# Patient Record
Sex: MALE | Race: WHITE | Employment: OTHER | URBAN - METROPOLITAN AREA
[De-identification: names, ages, dates, MRNs, and addresses within clinical notes are randomized per-mention and may not be internally consistent; named-entity substitution may affect disease eponyms.]

---

## 2018-03-22 ENCOUNTER — HOSPITAL ENCOUNTER (OUTPATIENT)
Dept: CARDIOLOGY | Age: 68
Discharge: HOME | End: 2018-03-22
Payer: MEDICARE

## 2018-03-22 DIAGNOSIS — I48.0 PAROXYSMAL ATRIAL FIBRILLATION (HCC): ICD-10-CM

## 2018-03-22 DIAGNOSIS — I42.8 NONISCHEMIC CARDIOMYOPATHY (HCC): ICD-10-CM

## 2018-03-22 LAB
LEFT VENTRICULAR EJECTION FRACTION HIGH VALUE: 55 %
LEFT VENTRICULAR EJECTION FRACTION MODE: NORMAL
LV EF: 50 %
LV EF: 53 %
LVEF MODALITY: NORMAL

## 2018-03-22 PROCEDURE — 93306 TTE W/DOPPLER COMPLETE: CPT

## 2018-03-23 ENCOUNTER — TELEPHONE (OUTPATIENT)
Dept: CARDIOLOGY CLINIC | Age: 68
End: 2018-03-23

## 2018-03-23 NOTE — TELEPHONE ENCOUNTER
----- Message from [place] MD Kyle sent at 3/22/2018  5:23 PM EDT -----  Please notify patient that echocardiogram shows heart muscle function has improved. Continue as directed with needs of weight reduction to assist blood pressure and diabetes.  Follow up as scheduled

## 2018-03-26 ENCOUNTER — TELEPHONE (OUTPATIENT)
Dept: CARDIOLOGY CLINIC | Age: 68
End: 2018-03-26

## 2018-03-26 NOTE — TELEPHONE ENCOUNTER
3/26/18    Per Dr. Bobbette Meigs BP readings. Patient to continue current medications   Spoke with patient, very good and thank you  Feeling pretty good.     LAURITA

## 2018-06-14 ENCOUNTER — OFFICE VISIT (OUTPATIENT)
Dept: NON INVASIVE DIAGNOSTICS | Age: 68
End: 2018-06-14
Payer: MEDICARE

## 2018-06-14 VITALS
HEART RATE: 51 BPM | DIASTOLIC BLOOD PRESSURE: 70 MMHG | BODY MASS INDEX: 38.36 KG/M2 | WEIGHT: 315 LBS | HEIGHT: 76 IN | RESPIRATION RATE: 18 BRPM | SYSTOLIC BLOOD PRESSURE: 140 MMHG

## 2018-06-14 DIAGNOSIS — I48.19 PERSISTENT ATRIAL FIBRILLATION (HCC): Primary | ICD-10-CM

## 2018-06-14 PROCEDURE — 93000 ELECTROCARDIOGRAM COMPLETE: CPT | Performed by: INTERNAL MEDICINE

## 2018-06-14 PROCEDURE — 99214 OFFICE O/P EST MOD 30 MIN: CPT | Performed by: INTERNAL MEDICINE

## 2018-06-14 RX ORDER — LISINOPRIL 10 MG/1
10 TABLET ORAL DAILY
Qty: 90 TABLET | Refills: 3 | Status: SHIPPED | OUTPATIENT
Start: 2018-06-14 | End: 2018-12-10 | Stop reason: DRUGHIGH

## 2018-06-27 ENCOUNTER — HOSPITAL ENCOUNTER (OUTPATIENT)
Age: 68
Discharge: HOME | End: 2018-06-27
Payer: MEDICARE

## 2018-06-27 LAB
ALBUMIN SERPL-MCNC: 4.4 G/DL (ref 3.5–5.2)
ALP BLD-CCNC: 47 U/L (ref 40–129)
ALT SERPL-CCNC: 23 U/L (ref 0–40)
ANION GAP SERPL CALCULATED.3IONS-SCNC: 14 MMOL/L (ref 7–16)
AST SERPL-CCNC: 25 U/L (ref 0–39)
BILIRUB SERPL-MCNC: 0.5 MG/DL (ref 0–1.2)
BUN BLDV-MCNC: 24 MG/DL (ref 8–23)
CALCIUM SERPL-MCNC: 9.5 MG/DL (ref 8.6–10.2)
CHLORIDE BLD-SCNC: 103 MMOL/L (ref 98–107)
CHOLESTEROL, TOTAL: 109 MG/DL (ref 0–199)
CO2: 26 MMOL/L (ref 22–29)
CREAT SERPL-MCNC: 1.5 MG/DL (ref 0.7–1.2)
CREATININE URINE: 100 MG/DL (ref 40–278)
GFR AFRICAN AMERICAN: 56
GFR NON-AFRICAN AMERICAN: 46 ML/MIN/1.73
GLUCOSE BLD-MCNC: 167 MG/DL (ref 74–109)
HDLC SERPL-MCNC: 39 MG/DL
LDL CHOLESTEROL CALCULATED: 49 MG/DL (ref 0–99)
MICROALBUMIN UR-MCNC: 30.2 MG/L
MICROALBUMIN/CREAT UR-RTO: 30.2 (ref 0–30)
POTASSIUM SERPL-SCNC: 4.7 MMOL/L (ref 3.5–5)
SODIUM BLD-SCNC: 143 MMOL/L (ref 132–146)
TOTAL PROTEIN: 7.4 G/DL (ref 6.4–8.3)
TRIGL SERPL-MCNC: 103 MG/DL (ref 0–149)
TSH SERPL DL<=0.05 MIU/L-ACNC: 2.64 UIU/ML (ref 0.27–4.2)
VLDLC SERPL CALC-MCNC: 21 MG/DL

## 2018-06-27 PROCEDURE — 84443 ASSAY THYROID STIM HORMONE: CPT

## 2018-06-27 PROCEDURE — 36415 COLL VENOUS BLD VENIPUNCTURE: CPT

## 2018-06-27 PROCEDURE — 80061 LIPID PANEL: CPT

## 2018-06-27 PROCEDURE — 82044 UR ALBUMIN SEMIQUANTITATIVE: CPT

## 2018-06-27 PROCEDURE — 80053 COMPREHEN METABOLIC PANEL: CPT

## 2018-06-27 PROCEDURE — 82570 ASSAY OF URINE CREATININE: CPT

## 2018-08-16 ENCOUNTER — NURSE ONLY (OUTPATIENT)
Dept: NON INVASIVE DIAGNOSTICS | Age: 68
End: 2018-08-16
Payer: MEDICARE

## 2018-08-16 DIAGNOSIS — I48.19 PERSISTENT ATRIAL FIBRILLATION (HCC): Primary | ICD-10-CM

## 2018-08-16 PROCEDURE — 93000 ELECTROCARDIOGRAM COMPLETE: CPT | Performed by: INTERNAL MEDICINE

## 2018-08-16 RX ORDER — GLUCOSAMINE SULFATE 500 MG
2000 CAPSULE ORAL DAILY
COMMUNITY

## 2018-08-17 NOTE — PROGRESS NOTES
Patient was in today for EKG per Dr Angie Saenz. Patient stopped tikosyn end of June. Patient has no complaints and feels like he is in rhythm since stopping tikosyn.       Niurka West

## 2018-09-26 ENCOUNTER — NURSE ONLY (OUTPATIENT)
Dept: NON INVASIVE DIAGNOSTICS | Age: 68
End: 2018-09-26
Payer: MEDICARE

## 2018-09-26 DIAGNOSIS — I48.19 PERSISTENT ATRIAL FIBRILLATION (HCC): Primary | ICD-10-CM

## 2018-09-26 PROCEDURE — 93000 ELECTROCARDIOGRAM COMPLETE: CPT | Performed by: INTERNAL MEDICINE

## 2018-09-27 ENCOUNTER — TELEPHONE (OUTPATIENT)
Dept: NON INVASIVE DIAGNOSTICS | Age: 68
End: 2018-09-27

## 2018-09-27 DIAGNOSIS — I48.19 PERSISTENT ATRIAL FIBRILLATION (HCC): ICD-10-CM

## 2018-09-27 DIAGNOSIS — I49.1 PAC (PREMATURE ATRIAL CONTRACTION): Primary | ICD-10-CM

## 2018-09-28 ENCOUNTER — NURSE ONLY (OUTPATIENT)
Dept: NON INVASIVE DIAGNOSTICS | Age: 68
End: 2018-09-28

## 2018-10-08 ENCOUNTER — OFFICE VISIT (OUTPATIENT)
Dept: CARDIOLOGY CLINIC | Age: 68
End: 2018-10-08
Payer: MEDICARE

## 2018-10-08 VITALS
HEIGHT: 76 IN | WEIGHT: 314.4 LBS | HEART RATE: 118 BPM | SYSTOLIC BLOOD PRESSURE: 120 MMHG | DIASTOLIC BLOOD PRESSURE: 66 MMHG | BODY MASS INDEX: 38.29 KG/M2 | RESPIRATION RATE: 18 BRPM | OXYGEN SATURATION: 95 %

## 2018-10-08 DIAGNOSIS — E66.8 MODERATE OBESITY: ICD-10-CM

## 2018-10-08 DIAGNOSIS — I50.42 CHRONIC COMBINED SYSTOLIC AND DIASTOLIC CONGESTIVE HEART FAILURE (HCC): ICD-10-CM

## 2018-10-08 DIAGNOSIS — E78.2 MIXED HYPERLIPIDEMIA: ICD-10-CM

## 2018-10-08 DIAGNOSIS — G47.33 OBSTRUCTIVE SLEEP APNEA: ICD-10-CM

## 2018-10-08 DIAGNOSIS — I42.8 NONISCHEMIC CARDIOMYOPATHY (HCC): ICD-10-CM

## 2018-10-08 DIAGNOSIS — I48.0 PAROXYSMAL ATRIAL FIBRILLATION (HCC): Primary | ICD-10-CM

## 2018-10-08 PROCEDURE — 99215 OFFICE O/P EST HI 40 MIN: CPT | Performed by: INTERNAL MEDICINE

## 2018-10-08 PROCEDURE — 93000 ELECTROCARDIOGRAM COMPLETE: CPT | Performed by: INTERNAL MEDICINE

## 2018-10-08 RX ORDER — METOPROLOL SUCCINATE 25 MG/1
12.5 TABLET, EXTENDED RELEASE ORAL 2 TIMES DAILY
Qty: 30 TABLET | Refills: 3 | Status: SHIPPED | OUTPATIENT
Start: 2018-10-08 | End: 2018-10-08 | Stop reason: SDUPTHER

## 2018-10-08 RX ORDER — METOPROLOL SUCCINATE 25 MG/1
37.5 TABLET, EXTENDED RELEASE ORAL 2 TIMES DAILY
Qty: 30 TABLET | Refills: 0 | Status: SHIPPED | OUTPATIENT
Start: 2018-10-08 | End: 2018-11-27 | Stop reason: SDUPTHER

## 2018-10-08 RX ORDER — METOPROLOL SUCCINATE 25 MG/1
37.5 TABLET, EXTENDED RELEASE ORAL 2 TIMES DAILY
Qty: 135 TABLET | Refills: 3 | Status: SHIPPED | OUTPATIENT
Start: 2018-10-08 | End: 2018-10-08 | Stop reason: SDUPTHER

## 2018-10-08 NOTE — PROGRESS NOTES
CREATININE 1.5 (H) 06/27/2018    BUN 24 (H) 06/27/2018     06/27/2018    K 4.7 06/27/2018     06/27/2018    CO2 26 06/27/2018     No results found for: BNP  Lab Results   Component Value Date    WBC 7.2 01/09/2018    RBC 4.72 01/09/2018    HGB 14.1 01/09/2018    HCT 43.1 01/09/2018    MCV 91.3 01/09/2018    MCH 29.9 01/09/2018    MCHC 32.7 01/09/2018    RDW 14.6 01/09/2018     01/09/2018    MPV 10.7 01/09/2018     No results for input(s): ALKPHOS, ALT, AST, PROT, BILITOT, BILIDIR, LABALBU in the last 72 hours. Lab Results   Component Value Date    MG 2.1 01/09/2018     Lab Results   Component Value Date    PROTIME 13.2 11/28/2017    INR 1.2 11/28/2017     Lab Results   Component Value Date    TSH 2.640 06/27/2018     No components found for: CHLPL  Lab Results   Component Value Date    TRIG 103 06/27/2018     Lab Results   Component Value Date    HDL 39 06/27/2018     Lab Results   Component Value Date    LDLCALC 49 06/27/2018       Cardiac Tests:  ECG: A resting electrocardiogram demonstrates evidence of atrial fibrillation with a mean ventricular response of approximately 120 bpm with occasional ventricular ectopy with evidence for left axis deviation, low voltage within the limb leads and a delayed precordial transition sound      ASSESSMENT / PLAN:  On a clinical basis, the patient has developed recurrent symptomatic paroxysmal atrial fibrillation with suboptimal rate control since his most recent electrophysiologic evaluation and discontinuation of antiarrhythmic therapy. Presently he is wearing an extended arrhythmia monitoring device over a two-week period to evaluate further management of his arrhythmia  and I have presently recommended an increase of his beta blocker dosage to 37.5 mg twice daily to achieve improved rate control pending review of his arrhythmia monitoring and electrophysiologic reassessment.  I have discussed his needs of continued appropriate lifestyle modification to achieve weight reduction which will additionally benefit his obstructive sleep apnea and attempt to further reduce risk of arrhythmia recurrence. Continued aggressive risk factor modification of blood pressure and serum lipids will remain essential to reducing risk of future atherosclerotic development. Presently plan his clinical reassessment in approximately 2 months and would happily reassess him in the interim should additional cardiovascular difficulties or concerns arise. The patient's current medication list, allergies, problem list and results of all previously ordered testing were reviewed at today's visit. Follow-up office visit in 2 months      Note: This report was completed using computerized voice recognition software. Every effort has been made to ensure accuracy, however; and invert and computerized transcription errors may be present. Silvina Caruso.  Moise Myles, [address] [facility]    An electronic copy of this follow-up progress note was forwarded to Dr. Suzi Eckert and Yeimy Camara

## 2018-10-16 ENCOUNTER — TELEPHONE (OUTPATIENT)
Dept: NON INVASIVE DIAGNOSTICS | Age: 68
End: 2018-10-16

## 2018-10-16 DIAGNOSIS — I48.19 PERSISTENT ATRIAL FIBRILLATION (HCC): ICD-10-CM

## 2018-10-16 DIAGNOSIS — I49.1 PAC (PREMATURE ATRIAL CONTRACTION): ICD-10-CM

## 2018-11-27 ENCOUNTER — OFFICE VISIT (OUTPATIENT)
Dept: NON INVASIVE DIAGNOSTICS | Age: 68
End: 2018-11-27
Payer: MEDICARE

## 2018-11-27 VITALS
HEIGHT: 76 IN | BODY MASS INDEX: 38.36 KG/M2 | HEART RATE: 145 BPM | SYSTOLIC BLOOD PRESSURE: 132 MMHG | DIASTOLIC BLOOD PRESSURE: 78 MMHG | RESPIRATION RATE: 18 BRPM | WEIGHT: 315 LBS

## 2018-11-27 DIAGNOSIS — I42.8 NONISCHEMIC CARDIOMYOPATHY (HCC): ICD-10-CM

## 2018-11-27 DIAGNOSIS — E66.8 MODERATE OBESITY: ICD-10-CM

## 2018-11-27 DIAGNOSIS — G47.33 OBSTRUCTIVE SLEEP APNEA: ICD-10-CM

## 2018-11-27 DIAGNOSIS — Z98.890 STATUS POST CATHETER ABLATION OF ATRIAL FIBRILLATION: ICD-10-CM

## 2018-11-27 DIAGNOSIS — I48.19 PERSISTENT ATRIAL FIBRILLATION (HCC): Primary | ICD-10-CM

## 2018-11-27 PROCEDURE — 93000 ELECTROCARDIOGRAM COMPLETE: CPT | Performed by: INTERNAL MEDICINE

## 2018-11-27 PROCEDURE — 99213 OFFICE O/P EST LOW 20 MIN: CPT | Performed by: INTERNAL MEDICINE

## 2018-11-27 RX ORDER — METOPROLOL SUCCINATE 50 MG/1
50 TABLET, EXTENDED RELEASE ORAL 2 TIMES DAILY
Qty: 180 TABLET | Refills: 3 | Status: SHIPPED | OUTPATIENT
Start: 2018-11-27 | End: 2020-01-13

## 2018-12-04 ENCOUNTER — ANESTHESIA (OUTPATIENT)
Dept: CARDIAC CATH/INVASIVE PROCEDURES | Age: 68
End: 2018-12-04

## 2018-12-04 ENCOUNTER — ANESTHESIA EVENT (OUTPATIENT)
Dept: CARDIAC CATH/INVASIVE PROCEDURES | Age: 68
End: 2018-12-04

## 2018-12-04 ENCOUNTER — HOSPITAL ENCOUNTER (INPATIENT)
Dept: CARDIAC CATH/INVASIVE PROCEDURES | Age: 68
LOS: 2 days | Discharge: HOME | DRG: 243 | End: 2018-12-07
Attending: INTERNAL MEDICINE | Admitting: INTERNAL MEDICINE
Payer: MEDICARE

## 2018-12-04 VITALS — DIASTOLIC BLOOD PRESSURE: 59 MMHG | SYSTOLIC BLOOD PRESSURE: 96 MMHG | OXYGEN SATURATION: 96 %

## 2018-12-04 DIAGNOSIS — I48.19 PERSISTENT ATRIAL FIBRILLATION (HCC): ICD-10-CM

## 2018-12-04 PROBLEM — I48.91 A-FIB (HCC): Status: ACTIVE | Noted: 2018-12-04

## 2018-12-04 LAB
ANION GAP SERPL CALCULATED.3IONS-SCNC: 11 MMOL/L (ref 7–16)
BASOPHILS ABSOLUTE: 0.03 E9/L (ref 0–0.2)
BASOPHILS RELATIVE PERCENT: 0.5 % (ref 0–2)
BUN BLDV-MCNC: 20 MG/DL (ref 8–23)
CALCIUM SERPL-MCNC: 9.2 MG/DL (ref 8.6–10.2)
CHLORIDE BLD-SCNC: 104 MMOL/L (ref 98–107)
CO2: 24 MMOL/L (ref 22–29)
CREAT SERPL-MCNC: 1.5 MG/DL (ref 0.7–1.2)
EKG ATRIAL RATE: 48 BPM
EKG ATRIAL RATE: 96 BPM
EKG P AXIS: 57 DEGREES
EKG P-R INTERVAL: 194 MS
EKG Q-T INTERVAL: 382 MS
EKG Q-T INTERVAL: 516 MS
EKG QRS DURATION: 102 MS
EKG QRS DURATION: 106 MS
EKG QTC CALCULATION (BAZETT): 460 MS
EKG QTC CALCULATION (BAZETT): 474 MS
EKG R AXIS: -49 DEGREES
EKG R AXIS: -5 DEGREES
EKG T AXIS: -136 DEGREES
EKG T AXIS: 168 DEGREES
EKG VENTRICULAR RATE: 48 BPM
EKG VENTRICULAR RATE: 93 BPM
EOSINOPHILS ABSOLUTE: 0.09 E9/L (ref 0.05–0.5)
EOSINOPHILS RELATIVE PERCENT: 1.4 % (ref 0–6)
GFR AFRICAN AMERICAN: 56
GFR NON-AFRICAN AMERICAN: 46 ML/MIN/1.73
GLUCOSE BLD-MCNC: 102 MG/DL (ref 74–99)
HBA1C MFR BLD: 7.1 % (ref 4–5.6)
HCT VFR BLD CALC: 38.4 % (ref 37–54)
HEMOGLOBIN: 12.1 G/DL (ref 12.5–16.5)
IMMATURE GRANULOCYTES #: 0.01 E9/L
IMMATURE GRANULOCYTES %: 0.2 % (ref 0–5)
LYMPHOCYTES ABSOLUTE: 1.04 E9/L (ref 1.5–4)
LYMPHOCYTES RELATIVE PERCENT: 16.7 % (ref 20–42)
MAGNESIUM: 1.9 MG/DL (ref 1.6–2.6)
MCH RBC QN AUTO: 29.4 PG (ref 26–35)
MCHC RBC AUTO-ENTMCNC: 31.5 % (ref 32–34.5)
MCV RBC AUTO: 93.2 FL (ref 80–99.9)
METER GLUCOSE: 155 MG/DL (ref 74–99)
METER GLUCOSE: 176 MG/DL (ref 74–99)
MONOCYTES ABSOLUTE: 0.52 E9/L (ref 0.1–0.95)
MONOCYTES RELATIVE PERCENT: 8.4 % (ref 2–12)
NEUTROPHILS ABSOLUTE: 4.52 E9/L (ref 1.8–7.3)
NEUTROPHILS RELATIVE PERCENT: 72.8 % (ref 43–80)
PDW BLD-RTO: 14.2 FL (ref 11.5–15)
PLATELET # BLD: 188 E9/L (ref 130–450)
PMV BLD AUTO: 12.2 FL (ref 7–12)
POTASSIUM SERPL-SCNC: 4.2 MMOL/L (ref 3.5–5)
RBC # BLD: 4.12 E12/L (ref 3.8–5.8)
SODIUM BLD-SCNC: 139 MMOL/L (ref 132–146)
WBC # BLD: 6.2 E9/L (ref 4.5–11.5)

## 2018-12-04 PROCEDURE — 2580000003 HC RX 258: Performed by: INTERNAL MEDICINE

## 2018-12-04 PROCEDURE — 6360000002 HC RX W HCPCS: Performed by: NURSE ANESTHETIST, CERTIFIED REGISTERED

## 2018-12-04 PROCEDURE — 92960 CARDIOVERSION ELECTRIC EXT: CPT | Performed by: INTERNAL MEDICINE

## 2018-12-04 PROCEDURE — 85025 COMPLETE CBC W/AUTO DIFF WBC: CPT

## 2018-12-04 PROCEDURE — 83036 HEMOGLOBIN GLYCOSYLATED A1C: CPT

## 2018-12-04 PROCEDURE — 82962 GLUCOSE BLOOD TEST: CPT

## 2018-12-04 PROCEDURE — 2709999900 HC NON-CHARGEABLE SUPPLY

## 2018-12-04 PROCEDURE — 2580000003 HC RX 258: Performed by: NURSE ANESTHETIST, CERTIFIED REGISTERED

## 2018-12-04 PROCEDURE — 3700000001 HC ADD 15 MINUTES (ANESTHESIA)

## 2018-12-04 PROCEDURE — 3700000000 HC ANESTHESIA ATTENDED CARE

## 2018-12-04 PROCEDURE — 93010 ELECTROCARDIOGRAM REPORT: CPT | Performed by: INTERNAL MEDICINE

## 2018-12-04 PROCEDURE — 6370000000 HC RX 637 (ALT 250 FOR IP): Performed by: INTERNAL MEDICINE

## 2018-12-04 PROCEDURE — 93005 ELECTROCARDIOGRAM TRACING: CPT | Performed by: INTERNAL MEDICINE

## 2018-12-04 PROCEDURE — 5A2204Z RESTORATION OF CARDIAC RHYTHM, SINGLE: ICD-10-PCS | Performed by: INTERNAL MEDICINE

## 2018-12-04 PROCEDURE — 80048 BASIC METABOLIC PNL TOTAL CA: CPT

## 2018-12-04 PROCEDURE — 36415 COLL VENOUS BLD VENIPUNCTURE: CPT

## 2018-12-04 PROCEDURE — 94660 CPAP INITIATION&MGMT: CPT

## 2018-12-04 PROCEDURE — 83735 ASSAY OF MAGNESIUM: CPT

## 2018-12-04 RX ORDER — DOFETILIDE 0.25 MG/1
250 CAPSULE ORAL EVERY 12 HOURS SCHEDULED
Status: DISCONTINUED | OUTPATIENT
Start: 2018-12-04 | End: 2018-12-05

## 2018-12-04 RX ORDER — SODIUM CHLORIDE 9 MG/ML
INJECTION, SOLUTION INTRAVENOUS ONCE
Status: COMPLETED | OUTPATIENT
Start: 2018-12-04 | End: 2018-12-04

## 2018-12-04 RX ORDER — DEXTROSE MONOHYDRATE 25 G/50ML
12.5 INJECTION, SOLUTION INTRAVENOUS PRN
Status: DISCONTINUED | OUTPATIENT
Start: 2018-12-04 | End: 2018-12-07 | Stop reason: HOSPADM

## 2018-12-04 RX ORDER — METOPROLOL SUCCINATE 25 MG/1
25 TABLET, EXTENDED RELEASE ORAL 2 TIMES DAILY
Status: DISCONTINUED | OUTPATIENT
Start: 2018-12-04 | End: 2018-12-05

## 2018-12-04 RX ORDER — GLIPIZIDE 5 MG/1
10 TABLET ORAL
Status: DISCONTINUED | OUTPATIENT
Start: 2018-12-04 | End: 2018-12-07 | Stop reason: HOSPADM

## 2018-12-04 RX ORDER — SODIUM CHLORIDE 0.9 % (FLUSH) 0.9 %
10 SYRINGE (ML) INJECTION EVERY 12 HOURS SCHEDULED
Status: DISCONTINUED | OUTPATIENT
Start: 2018-12-04 | End: 2018-12-06 | Stop reason: SDUPTHER

## 2018-12-04 RX ORDER — FUROSEMIDE 20 MG/1
20 TABLET ORAL 2 TIMES DAILY
Status: DISCONTINUED | OUTPATIENT
Start: 2018-12-04 | End: 2018-12-06

## 2018-12-04 RX ORDER — LANOLIN ALCOHOL/MO/W.PET/CERES
400 CREAM (GRAM) TOPICAL DAILY
Status: DISCONTINUED | OUTPATIENT
Start: 2018-12-04 | End: 2018-12-07 | Stop reason: HOSPADM

## 2018-12-04 RX ORDER — NICOTINE POLACRILEX 4 MG
15 LOZENGE BUCCAL PRN
Status: DISCONTINUED | OUTPATIENT
Start: 2018-12-04 | End: 2018-12-07 | Stop reason: HOSPADM

## 2018-12-04 RX ORDER — ATORVASTATIN CALCIUM 40 MG/1
40 TABLET, FILM COATED ORAL DAILY
Status: DISCONTINUED | OUTPATIENT
Start: 2018-12-05 | End: 2018-12-07 | Stop reason: HOSPADM

## 2018-12-04 RX ORDER — DEXTROSE MONOHYDRATE 50 MG/ML
100 INJECTION, SOLUTION INTRAVENOUS PRN
Status: DISCONTINUED | OUTPATIENT
Start: 2018-12-04 | End: 2018-12-07 | Stop reason: HOSPADM

## 2018-12-04 RX ORDER — INSULIN GLARGINE 100 [IU]/ML
25 INJECTION, SOLUTION SUBCUTANEOUS NIGHTLY
Status: DISCONTINUED | OUTPATIENT
Start: 2018-12-04 | End: 2018-12-07 | Stop reason: HOSPADM

## 2018-12-04 RX ORDER — TAMSULOSIN HYDROCHLORIDE 0.4 MG/1
0.4 CAPSULE ORAL NIGHTLY
Status: DISCONTINUED | OUTPATIENT
Start: 2018-12-04 | End: 2018-12-07 | Stop reason: HOSPADM

## 2018-12-04 RX ORDER — PROPOFOL 10 MG/ML
INJECTION, EMULSION INTRAVENOUS PRN
Status: DISCONTINUED | OUTPATIENT
Start: 2018-12-04 | End: 2018-12-04 | Stop reason: SDUPTHER

## 2018-12-04 RX ORDER — MULTIVITAMIN WITH FOLIC ACID 400 MCG
1 TABLET ORAL DAILY
Status: DISCONTINUED | OUTPATIENT
Start: 2018-12-04 | End: 2018-12-07 | Stop reason: HOSPADM

## 2018-12-04 RX ORDER — SODIUM CHLORIDE 9 MG/ML
INJECTION, SOLUTION INTRAVENOUS CONTINUOUS PRN
Status: DISCONTINUED | OUTPATIENT
Start: 2018-12-04 | End: 2018-12-04 | Stop reason: SDUPTHER

## 2018-12-04 RX ORDER — FENOFIBRATE 160 MG/1
160 TABLET ORAL DAILY
Status: DISCONTINUED | OUTPATIENT
Start: 2018-12-05 | End: 2018-12-07 | Stop reason: HOSPADM

## 2018-12-04 RX ORDER — SODIUM CHLORIDE 0.9 % (FLUSH) 0.9 %
10 SYRINGE (ML) INJECTION PRN
Status: DISCONTINUED | OUTPATIENT
Start: 2018-12-04 | End: 2018-12-06 | Stop reason: SDUPTHER

## 2018-12-04 RX ORDER — LISINOPRIL 5 MG/1
5 TABLET ORAL DAILY
Status: DISCONTINUED | OUTPATIENT
Start: 2018-12-05 | End: 2018-12-07 | Stop reason: HOSPADM

## 2018-12-04 RX ADMIN — TAMSULOSIN HYDROCHLORIDE 0.4 MG: 0.4 CAPSULE ORAL at 20:55

## 2018-12-04 RX ADMIN — Medication 10 ML: at 20:55

## 2018-12-04 RX ADMIN — METFORMIN HYDROCHLORIDE 1000 MG: 1000 TABLET ORAL at 16:08

## 2018-12-04 RX ADMIN — LINAGLIPTIN 5 MG: 5 TABLET, FILM COATED ORAL at 20:55

## 2018-12-04 RX ADMIN — GLIPIZIDE 10 MG: 5 TABLET ORAL at 16:08

## 2018-12-04 RX ADMIN — INSULIN GLARGINE 25 UNITS: 100 INJECTION, SOLUTION SUBCUTANEOUS at 20:52

## 2018-12-04 RX ADMIN — FUROSEMIDE 20 MG: 20 TABLET ORAL at 18:09

## 2018-12-04 RX ADMIN — METOPROLOL SUCCINATE 25 MG: 25 TABLET, FILM COATED, EXTENDED RELEASE ORAL at 20:51

## 2018-12-04 RX ADMIN — PROPOFOL 100 MG: 10 INJECTION, EMULSION INTRAVENOUS at 12:18

## 2018-12-04 RX ADMIN — DOFETILIDE 250 MCG: 0.25 CAPSULE ORAL at 18:09

## 2018-12-04 RX ADMIN — SODIUM CHLORIDE: 9 INJECTION, SOLUTION INTRAVENOUS at 12:14

## 2018-12-04 RX ADMIN — SODIUM CHLORIDE: 9 INJECTION, SOLUTION INTRAVENOUS at 09:40

## 2018-12-04 ASSESSMENT — PAIN SCALES - GENERAL
PAINLEVEL_OUTOF10: 0
PAINLEVEL_OUTOF10: 0

## 2018-12-04 NOTE — ANESTHESIA PRE PROCEDURE
Department of Anesthesiology  Preprocedure Note       Name:  Karsten Pepe   Age:  70 y.o.  :  1950                                          MRN:  [id number]         Date:  2018      Surgeon: * Surgery not found *DCCV    Procedure: CARDIOVERSION WITH ANESTHESIA    Medications prior to admission:   Prior to Admission medications    Medication Sig Start Date End Date Taking?  Authorizing Provider   metoprolol succinate (TOPROL XL) 50 MG extended release tablet Take 1 tablet by mouth 2 times daily 18  Yes Amy Chavarria MD   linagliptin (TRADJENTA) 5 MG tablet Take 5 mg by mouth daily   Yes Historical Provider, MD   Glucosamine 500 MG CAPS Take by mouth daily   Yes Historical Provider, MD   lisinopril (PRINIVIL;ZESTRIL) 10 MG tablet Take 1 tablet by mouth daily  Patient taking differently: Take 5 mg by mouth daily  18  Yes Colin Perea MD   rivaroxaban (XARELTO) 20 MG TABS tablet Take 1 tablet by mouth daily 5/15/18  Yes Mariely Isabel MD   JARDIANCE 25 MG tablet Take 25 mg by mouth daily  3/5/18  Yes Historical Provider, MD   glipiZIDE (GLUCOTROL) 10 MG tablet Take 10 mg by mouth 2 times daily (before meals)  3/5/18  Yes Historical Provider, MD   LANTUS SOLOSTAR 100 UNIT/ML injection pen Inject 25 Units into the skin nightly  3/5/18  Yes Historical Provider, MD   furosemide (LASIX) 20 MG tablet TAKE 1 TABLET TWICE A DAY 18  Yes Emil Machado MD   atorvastatin (LIPITOR) 40 MG tablet Take 40 mg by mouth daily  17  Yes Historical Provider, MD   fenofibric acid (FIBRICOR) 135 MG CPDR capsule Take 135 mg by mouth daily  17  Yes Historical Provider, MD   metFORMIN (GLUCOPHAGE) 1000 MG tablet Take 1,000 mg by mouth 2 times daily (with meals)  10/24/16  Yes Historical Provider, MD   Multiple Vitamin (MULTI VITAMIN DAILY PO) Take by mouth daily   Yes Historical Provider, MD   vitamin D (CHOLECALCIFEROL) 1000 UNIT TABS tablet Take 2,000 Units by mouth daily    Yes Historical Provider,

## 2018-12-05 PROBLEM — I49.5 SINUS NODE DYSFUNCTION (HCC): Status: ACTIVE | Noted: 2018-12-05

## 2018-12-05 PROBLEM — I49.5 TACHY-BRADY SYNDROME (HCC): Status: ACTIVE | Noted: 2018-12-05

## 2018-12-05 LAB
ANION GAP SERPL CALCULATED.3IONS-SCNC: 12 MMOL/L (ref 7–16)
ANION GAP SERPL CALCULATED.3IONS-SCNC: 13 MMOL/L (ref 7–16)
BASOPHILS ABSOLUTE: 0.04 E9/L (ref 0–0.2)
BASOPHILS RELATIVE PERCENT: 0.6 % (ref 0–2)
BUN BLDV-MCNC: 25 MG/DL (ref 8–23)
BUN BLDV-MCNC: 28 MG/DL (ref 8–23)
CALCIUM SERPL-MCNC: 8.9 MG/DL (ref 8.6–10.2)
CALCIUM SERPL-MCNC: 9.2 MG/DL (ref 8.6–10.2)
CHLORIDE BLD-SCNC: 105 MMOL/L (ref 98–107)
CHLORIDE BLD-SCNC: 106 MMOL/L (ref 98–107)
CO2: 22 MMOL/L (ref 22–29)
CO2: 23 MMOL/L (ref 22–29)
CREAT SERPL-MCNC: 1.7 MG/DL (ref 0.7–1.2)
CREAT SERPL-MCNC: 1.7 MG/DL (ref 0.7–1.2)
EOSINOPHILS ABSOLUTE: 0.16 E9/L (ref 0.05–0.5)
EOSINOPHILS RELATIVE PERCENT: 2.3 % (ref 0–6)
GFR AFRICAN AMERICAN: 48
GFR AFRICAN AMERICAN: 48
GFR NON-AFRICAN AMERICAN: 40 ML/MIN/1.73
GFR NON-AFRICAN AMERICAN: 40 ML/MIN/1.73
GLUCOSE BLD-MCNC: 169 MG/DL (ref 74–99)
GLUCOSE BLD-MCNC: 97 MG/DL (ref 74–99)
HCT VFR BLD CALC: 36.3 % (ref 37–54)
HEMOGLOBIN: 11.7 G/DL (ref 12.5–16.5)
IMMATURE GRANULOCYTES #: 0.02 E9/L
IMMATURE GRANULOCYTES %: 0.3 % (ref 0–5)
INR BLD: 1.4
LYMPHOCYTES ABSOLUTE: 1.36 E9/L (ref 1.5–4)
LYMPHOCYTES RELATIVE PERCENT: 19.7 % (ref 20–42)
MAGNESIUM: 2.2 MG/DL (ref 1.6–2.6)
MCH RBC QN AUTO: 30 PG (ref 26–35)
MCHC RBC AUTO-ENTMCNC: 32.2 % (ref 32–34.5)
MCV RBC AUTO: 93.1 FL (ref 80–99.9)
METER GLUCOSE: 101 MG/DL (ref 74–99)
METER GLUCOSE: 170 MG/DL (ref 74–99)
METER GLUCOSE: 88 MG/DL (ref 74–99)
METER GLUCOSE: 98 MG/DL (ref 74–99)
MONOCYTES ABSOLUTE: 0.51 E9/L (ref 0.1–0.95)
MONOCYTES RELATIVE PERCENT: 7.4 % (ref 2–12)
NEUTROPHILS ABSOLUTE: 4.83 E9/L (ref 1.8–7.3)
NEUTROPHILS RELATIVE PERCENT: 69.7 % (ref 43–80)
PDW BLD-RTO: 14.5 FL (ref 11.5–15)
PLATELET # BLD: 189 E9/L (ref 130–450)
PMV BLD AUTO: 12.2 FL (ref 7–12)
POTASSIUM REFLEX MAGNESIUM: 4.1 MMOL/L (ref 3.5–5)
POTASSIUM REFLEX MAGNESIUM: 4.4 MMOL/L (ref 3.5–5)
PROTHROMBIN TIME: 15.7 SEC (ref 9.3–12.4)
RBC # BLD: 3.9 E12/L (ref 3.8–5.8)
SODIUM BLD-SCNC: 140 MMOL/L (ref 132–146)
SODIUM BLD-SCNC: 141 MMOL/L (ref 132–146)
WBC # BLD: 6.9 E9/L (ref 4.5–11.5)

## 2018-12-05 PROCEDURE — 36415 COLL VENOUS BLD VENIPUNCTURE: CPT

## 2018-12-05 PROCEDURE — 82962 GLUCOSE BLOOD TEST: CPT

## 2018-12-05 PROCEDURE — 2060000000 HC ICU INTERMEDIATE R&B

## 2018-12-05 PROCEDURE — 93005 ELECTROCARDIOGRAM TRACING: CPT | Performed by: INTERNAL MEDICINE

## 2018-12-05 PROCEDURE — 85610 PROTHROMBIN TIME: CPT

## 2018-12-05 PROCEDURE — 85025 COMPLETE CBC W/AUTO DIFF WBC: CPT

## 2018-12-05 PROCEDURE — 2580000003 HC RX 258: Performed by: INTERNAL MEDICINE

## 2018-12-05 PROCEDURE — 6370000000 HC RX 637 (ALT 250 FOR IP): Performed by: INTERNAL MEDICINE

## 2018-12-05 PROCEDURE — 80048 BASIC METABOLIC PNL TOTAL CA: CPT

## 2018-12-05 PROCEDURE — 83735 ASSAY OF MAGNESIUM: CPT

## 2018-12-05 PROCEDURE — 99232 SBSQ HOSP IP/OBS MODERATE 35: CPT | Performed by: INTERNAL MEDICINE

## 2018-12-05 RX ORDER — CHLORHEXIDINE GLUCONATE 4 G/100ML
SOLUTION TOPICAL ONCE
Status: DISCONTINUED | OUTPATIENT
Start: 2018-12-05 | End: 2018-12-06

## 2018-12-05 RX ORDER — SODIUM CHLORIDE 0.9 % (FLUSH) 0.9 %
10 SYRINGE (ML) INJECTION PRN
Status: DISCONTINUED | OUTPATIENT
Start: 2018-12-05 | End: 2018-12-06

## 2018-12-05 RX ORDER — DOFETILIDE 0.12 MG/1
125 CAPSULE ORAL EVERY 12 HOURS SCHEDULED
Status: DISCONTINUED | OUTPATIENT
Start: 2018-12-05 | End: 2018-12-07 | Stop reason: HOSPADM

## 2018-12-05 RX ORDER — METOPROLOL SUCCINATE 25 MG/1
25 TABLET, EXTENDED RELEASE ORAL DAILY
Status: DISCONTINUED | OUTPATIENT
Start: 2018-12-05 | End: 2018-12-07 | Stop reason: HOSPADM

## 2018-12-05 RX ORDER — SODIUM CHLORIDE 0.9 % (FLUSH) 0.9 %
10 SYRINGE (ML) INJECTION EVERY 12 HOURS SCHEDULED
Status: DISCONTINUED | OUTPATIENT
Start: 2018-12-05 | End: 2018-12-06

## 2018-12-05 RX ADMIN — MULTIVITAMIN TABLET 1 TABLET: TABLET at 08:36

## 2018-12-05 RX ADMIN — FUROSEMIDE 20 MG: 20 TABLET ORAL at 08:36

## 2018-12-05 RX ADMIN — LINAGLIPTIN 5 MG: 5 TABLET, FILM COATED ORAL at 21:06

## 2018-12-05 RX ADMIN — MAGNESIUM GLUCONATE 500 MG ORAL TABLET 400 MG: 500 TABLET ORAL at 08:36

## 2018-12-05 RX ADMIN — METFORMIN HYDROCHLORIDE 1000 MG: 1000 TABLET ORAL at 08:36

## 2018-12-05 RX ADMIN — Medication 10 ML: at 08:36

## 2018-12-05 RX ADMIN — GLIPIZIDE 10 MG: 5 TABLET ORAL at 15:46

## 2018-12-05 RX ADMIN — GLIPIZIDE 10 MG: 5 TABLET ORAL at 06:53

## 2018-12-05 RX ADMIN — METFORMIN HYDROCHLORIDE 1000 MG: 1000 TABLET ORAL at 15:46

## 2018-12-05 RX ADMIN — VITAMIN D, TAB 1000IU (100/BT) 2000 UNITS: 25 TAB at 08:36

## 2018-12-05 RX ADMIN — DOFETILIDE 250 MCG: 0.25 CAPSULE ORAL at 05:58

## 2018-12-05 RX ADMIN — RIVAROXABAN 20 MG: 20 TABLET, FILM COATED ORAL at 08:35

## 2018-12-05 RX ADMIN — ATORVASTATIN CALCIUM 40 MG: 40 TABLET, FILM COATED ORAL at 08:36

## 2018-12-05 RX ADMIN — DOFETILIDE 125 MCG: 0.12 CAPSULE ORAL at 17:55

## 2018-12-05 RX ADMIN — Medication 10 ML: at 21:06

## 2018-12-05 RX ADMIN — TAMSULOSIN HYDROCHLORIDE 0.4 MG: 0.4 CAPSULE ORAL at 21:06

## 2018-12-05 RX ADMIN — LISINOPRIL 5 MG: 5 TABLET ORAL at 08:36

## 2018-12-05 RX ADMIN — FUROSEMIDE 20 MG: 20 TABLET ORAL at 17:55

## 2018-12-05 RX ADMIN — FENOFIBRATE 160 MG: 160 TABLET ORAL at 08:36

## 2018-12-05 ASSESSMENT — PAIN SCALES - GENERAL
PAINLEVEL_OUTOF10: 0

## 2018-12-05 NOTE — PROGRESS NOTES
Medications:   dextrose         PRN Medications:  sodium chloride flush, glucose, dextrose, glucagon (rDNA), dextrose    No Known Allergies    Wt Readings from Last 3 Encounters:   12/05/18 (!) 310 lb 11.2 oz (140.9 kg)   11/27/18 (!) 320 lb (145.2 kg)   10/08/18 (!) 314 lb 6.4 oz (142.6 kg)     Temp Readings from Last 3 Encounters:   12/05/18 97.4 °F (36.3 °C) (Temporal)   12/01/17 98.3 °F (36.8 °C) (Oral)   11/07/17 97.5 °F (36.4 °C)     BP Readings from Last 3 Encounters:   12/05/18 130/73   12/04/18 (!) 96/59   11/27/18 132/78     Pulse Readings from Last 3 Encounters:   12/05/18 (!) 45   11/27/18 145   10/08/18 118         Intake/Output Summary (Last 24 hours) at 12/05/18 1134  Last data filed at 12/05/18 0800   Gross per 24 hour   Intake             1090 ml   Output              925 ml   Net              165 ml       Review of Systems:   Constitutional: Negative for fever, activity change and appetite change. HENT: Negative for epistaxis. Eyes: Negative for diploplia, blurred vision. Respiratory: Negative for cough, chest tightness, shortness of breath and wheezing. Cardiovascular: negative except as outlined in the HPI  Gastrointestinal: Negative for abdominal pain and blood in stool. All other review of systems are negative     Physical exam:  Constitutional   Vitals:    12/05/18 0015 12/05/18 0345 12/05/18 0512 12/05/18 0800   BP: (!) 118/57 110/60  130/73   Pulse: 62 58  (!) 45   Resp: 18   16   Temp: 97.8 °F (36.6 °C) 98 °F (36.7 °C)  97.4 °F (36.3 °C)   TempSrc: Temporal Temporal  Temporal   SpO2: 97% 95%  97%   Weight:   (!) 310 lb 11.2 oz (140.9 kg)    Height:        Well-developed, no acute distress, well groomed  Eyes: conjunctivae normal, no xanthelasma   Ears, Nose, Mouth, Throat: oral mucosa moist, no cyanosis   Cardiovascular: bradycardic rate, regular rhythm,  no murmurs, rubs, or gallops.  PMI is nondisplaced  Respiratory: clear to auscultation bilaterally, normal respiratory effort without used of accessory muscles, no wheezes  Gastrointestinal: soft, non-tender, bowel sounds present, no masses or hepatomegaly   Musculoskeletal: no digital clubbing, trace edema   Skin: warm, no rashes   Neuro/Psych: A&O x 3, normal mood and affect    I have personally reviewed the laboratory, cardiac diagnostic and radiographic testing as outlined below:    ECG: sinus vaughn rate 47, QTc 511  - prior EKG this AM showed sinus arrest with junctional escape   - see scan in Cardiology Tab    Rhythm strip review: sinus with intermittent junctional escape during sinus arrest rates in 40s    Recent Labs      12/04/18   0810   WBC  6.2   HGB  12.1*   HCT  38.4   MCV  93.2   PLT  188     Recent Labs      12/04/18   0810  12/05/18   0551   NA  139  140   K  4.2  4.1   CL  104  105   CO2  24  22   BUN  20  25*   CREATININE  1.5*  1.7*     No results for input(s): PROTIME, INR in the last 72 hours. No results for input(s): CKTOTAL, CKMB, CKMBINDEX, TROPONINI in the last 72 hours. No results for input(s): BNP in the last 72 hours. No results for input(s): CHOL, HDL, TRIG in the last 72 hours. Invalid input(s): CHOLHDLR, LDLCALCU  PT/INR:    Lab Results   Component Value Date    PROTIME 13.2 11/28/2017    INR 1.2 11/28/2017     TSH:    Lab Results   Component Value Date    TSH 2.640 06/27/2018     Lab Results   Component Value Date    MG 2.2 12/05/2018       I have independently visualized/reviewed all of the available ECGs and rhythm strip tracings pertinent as stated above    Assessment/Plan:    1.  Symptomatic persistent AF  - found 7/2016 and LV dysfunction  - KEVIN/DCCV and tikosn initiation 1/2017  - PVI + LA + RA rotor, SVC isolation, CTI fluter and LA roof linear ablation 11/2017  - maintained sinus, dofetilide stopping in 6/2018 with recurrent persistent AF on 9/2018 [facility]  - here for dofetilide re-initiation  - known severe atriopathy  - QTc ~511ms with sinus vaughn, will decrease dofetilide dose to 125mcg  - keep

## 2018-12-06 ENCOUNTER — APPOINTMENT (OUTPATIENT)
Dept: GENERAL RADIOLOGY | Age: 68
DRG: 243 | End: 2018-12-06
Attending: INTERNAL MEDICINE
Payer: MEDICARE

## 2018-12-06 ENCOUNTER — APPOINTMENT (OUTPATIENT)
Dept: CARDIAC CATH/INVASIVE PROCEDURES | Age: 68
DRG: 243 | End: 2018-12-06
Attending: INTERNAL MEDICINE
Payer: MEDICARE

## 2018-12-06 ENCOUNTER — ANESTHESIA EVENT (OUTPATIENT)
Dept: CARDIAC CATH/INVASIVE PROCEDURES | Age: 68
DRG: 243 | End: 2018-12-06
Payer: MEDICARE

## 2018-12-06 ENCOUNTER — ANESTHESIA (OUTPATIENT)
Dept: CARDIAC CATH/INVASIVE PROCEDURES | Age: 68
DRG: 243 | End: 2018-12-06
Payer: MEDICARE

## 2018-12-06 VITALS — DIASTOLIC BLOOD PRESSURE: 57 MMHG | SYSTOLIC BLOOD PRESSURE: 140 MMHG | OXYGEN SATURATION: 96 %

## 2018-12-06 LAB
ANION GAP SERPL CALCULATED.3IONS-SCNC: 15 MMOL/L (ref 7–16)
BUN BLDV-MCNC: 24 MG/DL (ref 8–23)
CALCIUM SERPL-MCNC: 8.8 MG/DL (ref 8.6–10.2)
CHLORIDE BLD-SCNC: 104 MMOL/L (ref 98–107)
CO2: 22 MMOL/L (ref 22–29)
CREAT SERPL-MCNC: 1.6 MG/DL (ref 0.7–1.2)
EKG ATRIAL RATE: 47 BPM
EKG ATRIAL RATE: 48 BPM
EKG ATRIAL RATE: 49 BPM
EKG ATRIAL RATE: 54 BPM
EKG ATRIAL RATE: 56 BPM
EKG P AXIS: 46 DEGREES
EKG P AXIS: 47 DEGREES
EKG P AXIS: 50 DEGREES
EKG P-R INTERVAL: 188 MS
EKG P-R INTERVAL: 190 MS
EKG P-R INTERVAL: 198 MS
EKG Q-T INTERVAL: 486 MS
EKG Q-T INTERVAL: 530 MS
EKG Q-T INTERVAL: 550 MS
EKG Q-T INTERVAL: 578 MS
EKG Q-T INTERVAL: 588 MS
EKG QRS DURATION: 100 MS
EKG QRS DURATION: 108 MS
EKG QRS DURATION: 114 MS
EKG QRS DURATION: 128 MS
EKG QRS DURATION: 96 MS
EKG QTC CALCULATION (BAZETT): 468 MS
EKG QTC CALCULATION (BAZETT): 501 MS
EKG QTC CALCULATION (BAZETT): 502 MS
EKG QTC CALCULATION (BAZETT): 511 MS
EKG QTC CALCULATION (BAZETT): 525 MS
EKG R AXIS: -2 DEGREES
EKG R AXIS: -30 DEGREES
EKG R AXIS: -31 DEGREES
EKG R AXIS: -34 DEGREES
EKG R AXIS: -48 DEGREES
EKG T AXIS: -119 DEGREES
EKG T AXIS: -119 DEGREES
EKG T AXIS: 147 DEGREES
EKG T AXIS: 172 DEGREES
EKG T AXIS: 180 DEGREES
EKG VENTRICULAR RATE: 47 BPM
EKG VENTRICULAR RATE: 48 BPM
EKG VENTRICULAR RATE: 50 BPM
EKG VENTRICULAR RATE: 54 BPM
EKG VENTRICULAR RATE: 56 BPM
GFR AFRICAN AMERICAN: 52
GFR NON-AFRICAN AMERICAN: 43 ML/MIN/1.73
GLUCOSE BLD-MCNC: 113 MG/DL (ref 74–99)
MAGNESIUM: 2.2 MG/DL (ref 1.6–2.6)
METER GLUCOSE: 108 MG/DL (ref 74–99)
METER GLUCOSE: 128 MG/DL (ref 74–99)
METER GLUCOSE: 84 MG/DL (ref 74–99)
METER GLUCOSE: 91 MG/DL (ref 74–99)
POTASSIUM REFLEX MAGNESIUM: 4 MMOL/L (ref 3.5–5)
SODIUM BLD-SCNC: 141 MMOL/L (ref 132–146)

## 2018-12-06 PROCEDURE — 6360000002 HC RX W HCPCS

## 2018-12-06 PROCEDURE — 93005 ELECTROCARDIOGRAM TRACING: CPT | Performed by: INTERNAL MEDICINE

## 2018-12-06 PROCEDURE — 33208 INSRT HEART PM ATRIAL & VENT: CPT | Performed by: INTERNAL MEDICINE

## 2018-12-06 PROCEDURE — 80048 BASIC METABOLIC PNL TOTAL CA: CPT

## 2018-12-06 PROCEDURE — 3700000000 HC ANESTHESIA ATTENDED CARE

## 2018-12-06 PROCEDURE — 71045 X-RAY EXAM CHEST 1 VIEW: CPT

## 2018-12-06 PROCEDURE — 6360000002 HC RX W HCPCS: Performed by: NURSE ANESTHETIST, CERTIFIED REGISTERED

## 2018-12-06 PROCEDURE — 93010 ELECTROCARDIOGRAM REPORT: CPT | Performed by: INTERNAL MEDICINE

## 2018-12-06 PROCEDURE — 6370000000 HC RX 637 (ALT 250 FOR IP): Performed by: INTERNAL MEDICINE

## 2018-12-06 PROCEDURE — C1894 INTRO/SHEATH, NON-LASER: HCPCS

## 2018-12-06 PROCEDURE — 3700000001 HC ADD 15 MINUTES (ANESTHESIA)

## 2018-12-06 PROCEDURE — 2580000003 HC RX 258

## 2018-12-06 PROCEDURE — B5171ZZ FLUOROSCOPY OF LEFT SUBCLAVIAN VEIN USING LOW OSMOLAR CONTRAST: ICD-10-PCS | Performed by: INTERNAL MEDICINE

## 2018-12-06 PROCEDURE — 02H63JZ INSERTION OF PACEMAKER LEAD INTO RIGHT ATRIUM, PERCUTANEOUS APPROACH: ICD-10-PCS | Performed by: INTERNAL MEDICINE

## 2018-12-06 PROCEDURE — 82962 GLUCOSE BLOOD TEST: CPT

## 2018-12-06 PROCEDURE — 2500000003 HC RX 250 WO HCPCS: Performed by: NURSE ANESTHETIST, CERTIFIED REGISTERED

## 2018-12-06 PROCEDURE — C1898 LEAD, PMKR, OTHER THAN TRANS: HCPCS

## 2018-12-06 PROCEDURE — 6360000002 HC RX W HCPCS: Performed by: INTERNAL MEDICINE

## 2018-12-06 PROCEDURE — 2060000000 HC ICU INTERMEDIATE R&B

## 2018-12-06 PROCEDURE — 2709999900 HC NON-CHARGEABLE SUPPLY

## 2018-12-06 PROCEDURE — 0JH606Z INSERTION OF PACEMAKER, DUAL CHAMBER INTO CHEST SUBCUTANEOUS TISSUE AND FASCIA, OPEN APPROACH: ICD-10-PCS | Performed by: INTERNAL MEDICINE

## 2018-12-06 PROCEDURE — 2500000003 HC RX 250 WO HCPCS

## 2018-12-06 PROCEDURE — 36415 COLL VENOUS BLD VENIPUNCTURE: CPT

## 2018-12-06 PROCEDURE — 2580000003 HC RX 258: Performed by: INTERNAL MEDICINE

## 2018-12-06 PROCEDURE — 83735 ASSAY OF MAGNESIUM: CPT

## 2018-12-06 PROCEDURE — 02HK3JZ INSERTION OF PACEMAKER LEAD INTO RIGHT VENTRICLE, PERCUTANEOUS APPROACH: ICD-10-PCS | Performed by: INTERNAL MEDICINE

## 2018-12-06 PROCEDURE — C1785 PMKR, DUAL, RATE-RESP: HCPCS

## 2018-12-06 RX ORDER — HYDROCODONE BITARTRATE AND ACETAMINOPHEN 5; 325 MG/1; MG/1
2 TABLET ORAL EVERY 4 HOURS PRN
Status: DISCONTINUED | OUTPATIENT
Start: 2018-12-06 | End: 2018-12-07 | Stop reason: HOSPADM

## 2018-12-06 RX ORDER — MIDAZOLAM HYDROCHLORIDE 1 MG/ML
INJECTION INTRAMUSCULAR; INTRAVENOUS PRN
Status: DISCONTINUED | OUTPATIENT
Start: 2018-12-06 | End: 2018-12-06 | Stop reason: SDUPTHER

## 2018-12-06 RX ORDER — SODIUM CHLORIDE 0.9 % (FLUSH) 0.9 %
10 SYRINGE (ML) INJECTION EVERY 12 HOURS SCHEDULED
Status: DISCONTINUED | OUTPATIENT
Start: 2018-12-06 | End: 2018-12-07 | Stop reason: HOSPADM

## 2018-12-06 RX ORDER — LIDOCAINE HYDROCHLORIDE 20 MG/ML
INJECTION, SOLUTION INFILTRATION; PERINEURAL PRN
Status: DISCONTINUED | OUTPATIENT
Start: 2018-12-06 | End: 2018-12-06 | Stop reason: SDUPTHER

## 2018-12-06 RX ORDER — HYDROCODONE BITARTRATE AND ACETAMINOPHEN 5; 325 MG/1; MG/1
1 TABLET ORAL EVERY 4 HOURS PRN
Status: DISCONTINUED | OUTPATIENT
Start: 2018-12-06 | End: 2018-12-07 | Stop reason: HOSPADM

## 2018-12-06 RX ORDER — FENTANYL CITRATE 50 UG/ML
INJECTION, SOLUTION INTRAMUSCULAR; INTRAVENOUS PRN
Status: DISCONTINUED | OUTPATIENT
Start: 2018-12-06 | End: 2018-12-06 | Stop reason: SDUPTHER

## 2018-12-06 RX ORDER — PROPOFOL 10 MG/ML
INJECTION, EMULSION INTRAVENOUS PRN
Status: DISCONTINUED | OUTPATIENT
Start: 2018-12-06 | End: 2018-12-06 | Stop reason: SDUPTHER

## 2018-12-06 RX ORDER — PROPOFOL 10 MG/ML
INJECTION, EMULSION INTRAVENOUS CONTINUOUS PRN
Status: DISCONTINUED | OUTPATIENT
Start: 2018-12-06 | End: 2018-12-06 | Stop reason: SDUPTHER

## 2018-12-06 RX ORDER — SODIUM CHLORIDE 9 MG/ML
INJECTION, SOLUTION INTRAVENOUS CONTINUOUS PRN
Status: DISCONTINUED | OUTPATIENT
Start: 2018-12-06 | End: 2018-12-06 | Stop reason: SDUPTHER

## 2018-12-06 RX ORDER — ACETAMINOPHEN 325 MG/1
650 TABLET ORAL EVERY 4 HOURS PRN
Status: DISCONTINUED | OUTPATIENT
Start: 2018-12-06 | End: 2018-12-07 | Stop reason: HOSPADM

## 2018-12-06 RX ORDER — SODIUM CHLORIDE 9 MG/ML
INJECTION, SOLUTION INTRAVENOUS ONCE
Status: COMPLETED | OUTPATIENT
Start: 2018-12-06 | End: 2018-12-06

## 2018-12-06 RX ORDER — SODIUM CHLORIDE 0.9 % (FLUSH) 0.9 %
10 SYRINGE (ML) INJECTION PRN
Status: DISCONTINUED | OUTPATIENT
Start: 2018-12-06 | End: 2018-12-07 | Stop reason: HOSPADM

## 2018-12-06 RX ORDER — FUROSEMIDE 40 MG/1
40 TABLET ORAL 2 TIMES DAILY
Status: DISCONTINUED | OUTPATIENT
Start: 2018-12-06 | End: 2018-12-07 | Stop reason: HOSPADM

## 2018-12-06 RX ORDER — CEFAZOLIN SODIUM 1 G/3ML
INJECTION, POWDER, FOR SOLUTION INTRAMUSCULAR; INTRAVENOUS PRN
Status: DISCONTINUED | OUTPATIENT
Start: 2018-12-06 | End: 2018-12-06 | Stop reason: SDUPTHER

## 2018-12-06 RX ADMIN — INSULIN GLARGINE 25 UNITS: 100 INJECTION, SOLUTION SUBCUTANEOUS at 21:12

## 2018-12-06 RX ADMIN — PROPOFOL 10 MG: 10 INJECTION, EMULSION INTRAVENOUS at 09:20

## 2018-12-06 RX ADMIN — LIDOCAINE HYDROCHLORIDE 20 MG: 20 INJECTION, SOLUTION INFILTRATION; PERINEURAL at 09:20

## 2018-12-06 RX ADMIN — SODIUM CHLORIDE: 9 INJECTION, SOLUTION INTRAVENOUS at 09:05

## 2018-12-06 RX ADMIN — GLIPIZIDE 10 MG: 5 TABLET ORAL at 12:11

## 2018-12-06 RX ADMIN — MAGNESIUM GLUCONATE 500 MG ORAL TABLET 400 MG: 500 TABLET ORAL at 12:07

## 2018-12-06 RX ADMIN — FENTANYL CITRATE 50 MCG: 50 INJECTION, SOLUTION INTRAMUSCULAR; INTRAVENOUS at 09:18

## 2018-12-06 RX ADMIN — TAMSULOSIN HYDROCHLORIDE 0.4 MG: 0.4 CAPSULE ORAL at 21:17

## 2018-12-06 RX ADMIN — Medication 10 ML: at 21:18

## 2018-12-06 RX ADMIN — LINAGLIPTIN 5 MG: 5 TABLET, FILM COATED ORAL at 21:17

## 2018-12-06 RX ADMIN — MULTIVITAMIN TABLET 1 TABLET: TABLET at 12:08

## 2018-12-06 RX ADMIN — METOPROLOL SUCCINATE 25 MG: 25 TABLET, FILM COATED, EXTENDED RELEASE ORAL at 12:07

## 2018-12-06 RX ADMIN — VITAMIN D, TAB 1000IU (100/BT) 2000 UNITS: 25 TAB at 12:07

## 2018-12-06 RX ADMIN — ATORVASTATIN CALCIUM 40 MG: 40 TABLET, FILM COATED ORAL at 12:07

## 2018-12-06 RX ADMIN — DOFETILIDE 125 MCG: 0.12 CAPSULE ORAL at 06:10

## 2018-12-06 RX ADMIN — METFORMIN HYDROCHLORIDE 1000 MG: 1000 TABLET ORAL at 12:08

## 2018-12-06 RX ADMIN — SODIUM CHLORIDE: 9 INJECTION, SOLUTION INTRAVENOUS at 08:14

## 2018-12-06 RX ADMIN — CEFAZOLIN SODIUM 3 G: 10 INJECTION, POWDER, FOR SOLUTION INTRAVENOUS at 18:20

## 2018-12-06 RX ADMIN — RIVAROXABAN 20 MG: 20 TABLET, FILM COATED ORAL at 18:12

## 2018-12-06 RX ADMIN — PROPOFOL 50 MCG/KG/MIN: 10 INJECTION, EMULSION INTRAVENOUS at 09:09

## 2018-12-06 RX ADMIN — LISINOPRIL 5 MG: 5 TABLET ORAL at 12:10

## 2018-12-06 RX ADMIN — ACETAMINOPHEN 650 MG: 325 TABLET, FILM COATED ORAL at 21:23

## 2018-12-06 RX ADMIN — ACETAMINOPHEN 650 MG: 325 TABLET, FILM COATED ORAL at 12:38

## 2018-12-06 RX ADMIN — CEFAZOLIN 3000 MG: 1 INJECTION, POWDER, FOR SOLUTION INTRAMUSCULAR; INTRAVENOUS at 09:17

## 2018-12-06 RX ADMIN — MIDAZOLAM HYDROCHLORIDE 2 MG: 1 INJECTION, SOLUTION INTRAMUSCULAR; INTRAVENOUS at 09:09

## 2018-12-06 RX ADMIN — FUROSEMIDE 40 MG: 20 TABLET ORAL at 18:12

## 2018-12-06 RX ADMIN — DOFETILIDE 125 MCG: 0.12 CAPSULE ORAL at 18:12

## 2018-12-06 RX ADMIN — FENTANYL CITRATE 50 MCG: 50 INJECTION, SOLUTION INTRAMUSCULAR; INTRAVENOUS at 09:19

## 2018-12-06 RX ADMIN — FUROSEMIDE 20 MG: 20 TABLET ORAL at 12:08

## 2018-12-06 RX ADMIN — FENOFIBRATE 160 MG: 160 TABLET ORAL at 12:08

## 2018-12-06 ASSESSMENT — PAIN SCALES - GENERAL
PAINLEVEL_OUTOF10: 3
PAINLEVEL_OUTOF10: 0
PAINLEVEL_OUTOF10: 0
PAINLEVEL_OUTOF10: 3
PAINLEVEL_OUTOF10: 0
PAINLEVEL_OUTOF10: 3
PAINLEVEL_OUTOF10: 3
PAINLEVEL_OUTOF10: 0

## 2018-12-06 ASSESSMENT — PAIN DESCRIPTION - ONSET: ONSET: GRADUAL

## 2018-12-06 ASSESSMENT — PAIN DESCRIPTION - LOCATION: LOCATION: SHOULDER

## 2018-12-06 ASSESSMENT — PULMONARY FUNCTION TESTS
PIF_VALUE: 0

## 2018-12-06 ASSESSMENT — PAIN DESCRIPTION - PROGRESSION: CLINICAL_PROGRESSION: GRADUALLY WORSENING

## 2018-12-06 ASSESSMENT — PAIN DESCRIPTION - PAIN TYPE: TYPE: CHRONIC PAIN

## 2018-12-06 ASSESSMENT — PAIN DESCRIPTION - DESCRIPTORS: DESCRIPTORS: CONSTANT;DISCOMFORT

## 2018-12-06 ASSESSMENT — PAIN DESCRIPTION - FREQUENCY: FREQUENCY: INTERMITTENT

## 2018-12-06 ASSESSMENT — PAIN DESCRIPTION - ORIENTATION: ORIENTATION: LEFT

## 2018-12-06 NOTE — ANESTHESIA PRE PROCEDURE
products discussed with patient whom. Plan discussed with attending. Jw Mueller RN   12/6/2018      DOS STAFF ADDENDUM:    Patient seen and chart reviewed. Physical exam and history updated as indicated. NPO status confirmed. Anesthesia options and plan discussed including risks benefits with patient/legal guardian and family as available. Concerns and questions addressed. Consent verbalized to proceed.   Anesthesia plan, options and intraoperative/postoperative concerns discussed with care team.    Tk Abel MD  12/6/2018  9:07 AM

## 2018-12-06 NOTE — PROGRESS NOTES
Messaged Dr. Naeem Sher via perfect serve to clarify, when Srinivasa Tiff can be given. Per message the Xarelto can be given tonight with dinner.

## 2018-12-06 NOTE — PLAN OF CARE
Problem: Falls - Risk of:  Goal: Absence of physical injury  Absence of physical injury   Outcome: Met This Shift      Problem: Cardiac:  Goal: Ability to maintain an adequate cardiac output will improve  Ability to maintain an adequate cardiac output will improve   Outcome: Met This Shift      Problem: Pain:  Goal: Control of acute pain  Control of acute pain   Outcome: Met This Shift

## 2018-12-06 NOTE — PROCEDURES
[address]  [place] [address] [facility]  [facility]cedure Report  PATIENT: Behzad Lott  MEDICAL RECORD NUMBER: [id number]  DATE OF PROCEDURE:  12/6/2018  ATTENDING ELECTROPHYSIOLOGIST:Chadd Cameron MD  REFERRING PHYSICIAN: Dr. Soumya Bacon    Procedure Performed:  1. Insertion of Dual Chamber Permanent Pacemaker (Medtronic- MRI conditional)  2. Left upper extremity venography  3. Fluoroscopy    Indication for Procedure:  1. Symptomatic sinus node dysfunction  2. Persistent atrial fibrillation    Flouroscopy: 76.0 min  Complications: none immediately apparent  EBL: Minimal  Specimens: none  Contrast: 10 cc    FINDINGS:  Implanted device information:  1. Pulse Generator is a Medtronic. Serial # [id number]  Placement: Left pectoral subcutaneous  Date of implant: 12/6/2018  2. Right atrial lead parameters are as follows:  Medtronic Model # [id number]. Serial # [id number]  Lead position: RA  Date of implant: 12/6/2018  P-waves: 1.5 mV  Pacing threshold: 1.25 V at 0.4 ms. Impedance: 494 ohms. 3. Right ventricular lead parameters are as follows:  Medtronic Model [id number]  Serial # [id number]  Lead position: RV  Date of implant: 12/6/2018  R-waves: 6.6 mV  Pacing threshold: 0.5 V at 0.4 ms. Impedance: 779 ohms. 4. Bradycardia parameters:  Mode: MVPR  Base Rate: 60  AV delay: 350  Max Tracking Rate: 120    DETAILS OF THE OPERATION: The risks, benefits, alternatives of the procedure were explained to patient and family. They consented and agreed to proceed. Written consent was obtained in the chart. Blood products are not routinely needed for such procedures. The patient was brought to the Electrophysiology lab in a fasting and non-sedated state. The patient had electrocardiographic and hemodynamic monitoring equipment placed. During the case the patient was under the care of an anesthesiologist/CRNA team for sedation and hemodynamic monitoring.  A final time out was performed

## 2018-12-07 ENCOUNTER — APPOINTMENT (OUTPATIENT)
Dept: GENERAL RADIOLOGY | Age: 68
DRG: 243 | End: 2018-12-07
Attending: INTERNAL MEDICINE
Payer: MEDICARE

## 2018-12-07 VITALS
SYSTOLIC BLOOD PRESSURE: 146 MMHG | BODY MASS INDEX: 37.51 KG/M2 | RESPIRATION RATE: 18 BRPM | OXYGEN SATURATION: 96 % | TEMPERATURE: 97.8 F | DIASTOLIC BLOOD PRESSURE: 67 MMHG | WEIGHT: 308 LBS | HEIGHT: 76 IN | HEART RATE: 63 BPM

## 2018-12-07 PROBLEM — Z98.890 STATUS POST CATHETER ABLATION OF ATRIAL FIBRILLATION: Status: ACTIVE | Noted: 2018-12-07

## 2018-12-07 LAB
ANION GAP SERPL CALCULATED.3IONS-SCNC: 15 MMOL/L (ref 7–16)
BUN BLDV-MCNC: 19 MG/DL (ref 8–23)
CALCIUM SERPL-MCNC: 8.7 MG/DL (ref 8.6–10.2)
CHLORIDE BLD-SCNC: 104 MMOL/L (ref 98–107)
CO2: 24 MMOL/L (ref 22–29)
CREAT SERPL-MCNC: 1.5 MG/DL (ref 0.7–1.2)
EKG ATRIAL RATE: 61 BPM
EKG ATRIAL RATE: 61 BPM
EKG P-R INTERVAL: 230 MS
EKG P-R INTERVAL: 232 MS
EKG Q-T INTERVAL: 488 MS
EKG Q-T INTERVAL: 508 MS
EKG QRS DURATION: 102 MS
EKG QRS DURATION: 104 MS
EKG QTC CALCULATION (BAZETT): 491 MS
EKG QTC CALCULATION (BAZETT): 511 MS
EKG R AXIS: -15 DEGREES
EKG R AXIS: -56 DEGREES
EKG T AXIS: 155 DEGREES
EKG T AXIS: 168 DEGREES
EKG VENTRICULAR RATE: 61 BPM
EKG VENTRICULAR RATE: 61 BPM
GFR AFRICAN AMERICAN: 56
GFR NON-AFRICAN AMERICAN: 46 ML/MIN/1.73
GLUCOSE BLD-MCNC: 80 MG/DL (ref 74–99)
HCT VFR BLD CALC: 37.3 % (ref 37–54)
HEMOGLOBIN: 11.8 G/DL (ref 12.5–16.5)
MAGNESIUM: 2.4 MG/DL (ref 1.6–2.6)
MCH RBC QN AUTO: 29.6 PG (ref 26–35)
MCHC RBC AUTO-ENTMCNC: 31.6 % (ref 32–34.5)
MCV RBC AUTO: 93.5 FL (ref 80–99.9)
METER GLUCOSE: 61 MG/DL (ref 74–99)
METER GLUCOSE: 95 MG/DL (ref 74–99)
PDW BLD-RTO: 14.5 FL (ref 11.5–15)
PLATELET # BLD: 171 E9/L (ref 130–450)
PMV BLD AUTO: 12.4 FL (ref 7–12)
POTASSIUM REFLEX MAGNESIUM: 3.9 MMOL/L (ref 3.5–5)
RBC # BLD: 3.99 E12/L (ref 3.8–5.8)
SODIUM BLD-SCNC: 143 MMOL/L (ref 132–146)
WBC # BLD: 6.1 E9/L (ref 4.5–11.5)

## 2018-12-07 PROCEDURE — 6360000002 HC RX W HCPCS: Performed by: INTERNAL MEDICINE

## 2018-12-07 PROCEDURE — 99239 HOSP IP/OBS DSCHRG MGMT >30: CPT | Performed by: INTERNAL MEDICINE

## 2018-12-07 PROCEDURE — 93280 PM DEVICE PROGR EVAL DUAL: CPT | Performed by: INTERNAL MEDICINE

## 2018-12-07 PROCEDURE — 85027 COMPLETE CBC AUTOMATED: CPT

## 2018-12-07 PROCEDURE — 6370000000 HC RX 637 (ALT 250 FOR IP): Performed by: INTERNAL MEDICINE

## 2018-12-07 PROCEDURE — 93005 ELECTROCARDIOGRAM TRACING: CPT | Performed by: INTERNAL MEDICINE

## 2018-12-07 PROCEDURE — 2580000003 HC RX 258: Performed by: INTERNAL MEDICINE

## 2018-12-07 PROCEDURE — 93010 ELECTROCARDIOGRAM REPORT: CPT | Performed by: INTERNAL MEDICINE

## 2018-12-07 PROCEDURE — 71045 X-RAY EXAM CHEST 1 VIEW: CPT

## 2018-12-07 PROCEDURE — 80048 BASIC METABOLIC PNL TOTAL CA: CPT

## 2018-12-07 PROCEDURE — 83735 ASSAY OF MAGNESIUM: CPT

## 2018-12-07 PROCEDURE — 82962 GLUCOSE BLOOD TEST: CPT

## 2018-12-07 PROCEDURE — 36415 COLL VENOUS BLD VENIPUNCTURE: CPT

## 2018-12-07 RX ORDER — LANOLIN ALCOHOL/MO/W.PET/CERES
400 CREAM (GRAM) TOPICAL DAILY
Qty: 30 TABLET | Refills: 5 | Status: SHIPPED | OUTPATIENT
Start: 2018-12-08 | End: 2018-12-17 | Stop reason: SDUPTHER

## 2018-12-07 RX ORDER — DOFETILIDE 0.12 MG/1
125 CAPSULE ORAL EVERY 12 HOURS SCHEDULED
Qty: 60 CAPSULE | Refills: 3 | Status: SHIPPED | OUTPATIENT
Start: 2018-12-07 | End: 2018-12-17 | Stop reason: SDUPTHER

## 2018-12-07 RX ADMIN — FENOFIBRATE 160 MG: 160 TABLET ORAL at 09:10

## 2018-12-07 RX ADMIN — Medication 10 ML: at 09:12

## 2018-12-07 RX ADMIN — LISINOPRIL 5 MG: 5 TABLET ORAL at 09:10

## 2018-12-07 RX ADMIN — MULTIVITAMIN TABLET 1 TABLET: TABLET at 09:10

## 2018-12-07 RX ADMIN — VITAMIN D, TAB 1000IU (100/BT) 2000 UNITS: 25 TAB at 09:10

## 2018-12-07 RX ADMIN — RIVAROXABAN 20 MG: 20 TABLET, FILM COATED ORAL at 09:10

## 2018-12-07 RX ADMIN — FUROSEMIDE 40 MG: 20 TABLET ORAL at 09:10

## 2018-12-07 RX ADMIN — METOPROLOL SUCCINATE 25 MG: 25 TABLET, FILM COATED, EXTENDED RELEASE ORAL at 09:10

## 2018-12-07 RX ADMIN — CEFAZOLIN SODIUM 3 G: 10 INJECTION, POWDER, FOR SOLUTION INTRAVENOUS at 01:52

## 2018-12-07 RX ADMIN — MAGNESIUM GLUCONATE 500 MG ORAL TABLET 400 MG: 500 TABLET ORAL at 09:10

## 2018-12-07 RX ADMIN — METFORMIN HYDROCHLORIDE 1000 MG: 1000 TABLET ORAL at 09:10

## 2018-12-07 RX ADMIN — DOFETILIDE 125 MCG: 0.12 CAPSULE ORAL at 06:04

## 2018-12-07 RX ADMIN — ATORVASTATIN CALCIUM 40 MG: 40 TABLET, FILM COATED ORAL at 09:10

## 2018-12-07 RX ADMIN — GLIPIZIDE 10 MG: 5 TABLET ORAL at 06:04

## 2018-12-07 ASSESSMENT — PAIN SCALES - GENERAL
PAINLEVEL_OUTOF10: 0
PAINLEVEL_OUTOF10: 0

## 2018-12-10 ENCOUNTER — TELEPHONE (OUTPATIENT)
Dept: PHARMACY | Facility: CLINIC | Age: 68
End: 2018-12-10

## 2018-12-10 DIAGNOSIS — I49.5 TACHY-BRADY SYNDROME (HCC): Primary | ICD-10-CM

## 2018-12-10 PROCEDURE — 1111F DSCHRG MED/CURRENT MED MERGE: CPT

## 2018-12-10 RX ORDER — LISINOPRIL 10 MG/1
10 TABLET ORAL DAILY
COMMUNITY
End: 2020-05-06 | Stop reason: DRUGHIGH

## 2018-12-10 RX ORDER — LORATADINE AND PSEUDOEPHEDRINE 10; 240 MG/1; MG/1
1 TABLET, EXTENDED RELEASE ORAL DAILY PRN
COMMUNITY
End: 2019-01-07

## 2018-12-17 RX ORDER — DOFETILIDE 0.12 MG/1
125 CAPSULE ORAL EVERY 12 HOURS SCHEDULED
Qty: 180 CAPSULE | Refills: 3 | Status: SHIPPED | OUTPATIENT
Start: 2018-12-17 | End: 2019-11-25 | Stop reason: SDUPTHER

## 2018-12-17 RX ORDER — LANOLIN ALCOHOL/MO/W.PET/CERES
400 CREAM (GRAM) TOPICAL DAILY
Qty: 30 TABLET | Refills: 5 | Status: SHIPPED | OUTPATIENT
Start: 2018-12-17

## 2018-12-21 ENCOUNTER — NURSE ONLY (OUTPATIENT)
Dept: NON INVASIVE DIAGNOSTICS | Age: 68
End: 2018-12-21

## 2018-12-21 ENCOUNTER — NURSE ONLY (OUTPATIENT)
Dept: NON INVASIVE DIAGNOSTICS | Age: 68
End: 2018-12-21
Payer: MEDICARE

## 2018-12-21 DIAGNOSIS — I49.5 TACHY-BRADY SYNDROME (HCC): ICD-10-CM

## 2018-12-21 DIAGNOSIS — I48.19 PERSISTENT ATRIAL FIBRILLATION (HCC): Primary | ICD-10-CM

## 2018-12-21 DIAGNOSIS — Z95.0 PACEMAKER: Primary | ICD-10-CM

## 2018-12-21 DIAGNOSIS — I48.19 PERSISTENT ATRIAL FIBRILLATION (HCC): ICD-10-CM

## 2018-12-21 PROCEDURE — 93280 PM DEVICE PROGR EVAL DUAL: CPT | Performed by: INTERNAL MEDICINE

## 2019-01-04 ENCOUNTER — TELEPHONE (OUTPATIENT)
Dept: NON INVASIVE DIAGNOSTICS | Age: 69
End: 2019-01-04

## 2019-01-07 ENCOUNTER — OFFICE VISIT (OUTPATIENT)
Dept: CARDIOLOGY CLINIC | Age: 69
End: 2019-01-07
Payer: MEDICARE

## 2019-01-07 VITALS
DIASTOLIC BLOOD PRESSURE: 70 MMHG | HEART RATE: 81 BPM | RESPIRATION RATE: 20 BRPM | BODY MASS INDEX: 37.26 KG/M2 | WEIGHT: 306 LBS | SYSTOLIC BLOOD PRESSURE: 140 MMHG | HEIGHT: 76 IN

## 2019-01-07 DIAGNOSIS — I48.3 TYPICAL ATRIAL FLUTTER (HCC): ICD-10-CM

## 2019-01-07 DIAGNOSIS — I50.42 CHRONIC COMBINED SYSTOLIC AND DIASTOLIC CONGESTIVE HEART FAILURE (HCC): ICD-10-CM

## 2019-01-07 DIAGNOSIS — N18.30 TYPE 2 DIABETES MELLITUS WITH STAGE 3 CHRONIC KIDNEY DISEASE, WITH LONG-TERM CURRENT USE OF INSULIN (HCC): ICD-10-CM

## 2019-01-07 DIAGNOSIS — Z79.4 TYPE 2 DIABETES MELLITUS WITH STAGE 3 CHRONIC KIDNEY DISEASE, WITH LONG-TERM CURRENT USE OF INSULIN (HCC): ICD-10-CM

## 2019-01-07 DIAGNOSIS — E66.8 MODERATE OBESITY: ICD-10-CM

## 2019-01-07 DIAGNOSIS — E78.2 MIXED HYPERLIPIDEMIA: ICD-10-CM

## 2019-01-07 DIAGNOSIS — I42.8 NONISCHEMIC CARDIOMYOPATHY (HCC): ICD-10-CM

## 2019-01-07 DIAGNOSIS — E11.22 TYPE 2 DIABETES MELLITUS WITH STAGE 3 CHRONIC KIDNEY DISEASE, WITH LONG-TERM CURRENT USE OF INSULIN (HCC): ICD-10-CM

## 2019-01-07 DIAGNOSIS — I49.5 SINUS NODE DYSFUNCTION (HCC): ICD-10-CM

## 2019-01-07 DIAGNOSIS — G47.33 OBSTRUCTIVE SLEEP APNEA: ICD-10-CM

## 2019-01-07 DIAGNOSIS — I48.0 PAROXYSMAL ATRIAL FIBRILLATION (HCC): Primary | ICD-10-CM

## 2019-01-07 PROCEDURE — 93000 ELECTROCARDIOGRAM COMPLETE: CPT | Performed by: INTERNAL MEDICINE

## 2019-01-07 PROCEDURE — 99215 OFFICE O/P EST HI 40 MIN: CPT | Performed by: INTERNAL MEDICINE

## 2019-01-09 ENCOUNTER — HOSPITAL ENCOUNTER (OUTPATIENT)
Age: 69
Discharge: HOME | End: 2019-01-09
Payer: MEDICARE

## 2019-01-09 LAB
ALBUMIN SERPL-MCNC: 4.2 G/DL (ref 3.5–5.2)
ALP BLD-CCNC: 70 U/L (ref 40–129)
ALT SERPL-CCNC: 20 U/L (ref 0–40)
ANION GAP SERPL CALCULATED.3IONS-SCNC: 12 MMOL/L (ref 7–16)
AST SERPL-CCNC: 19 U/L (ref 0–39)
BASOPHILS ABSOLUTE: 0.06 E9/L (ref 0–0.2)
BASOPHILS RELATIVE PERCENT: 1 % (ref 0–2)
BILIRUB SERPL-MCNC: 0.4 MG/DL (ref 0–1.2)
BUN BLDV-MCNC: 25 MG/DL (ref 8–23)
CALCIUM SERPL-MCNC: 9.4 MG/DL (ref 8.6–10.2)
CHLORIDE BLD-SCNC: 103 MMOL/L (ref 98–107)
CO2: 26 MMOL/L (ref 22–29)
CREAT SERPL-MCNC: 1.3 MG/DL (ref 0.7–1.2)
CREATININE URINE: 79 MG/DL (ref 40–278)
EOSINOPHILS ABSOLUTE: 0.53 E9/L (ref 0.05–0.5)
EOSINOPHILS RELATIVE PERCENT: 8.5 % (ref 0–6)
GFR AFRICAN AMERICAN: >60
GFR NON-AFRICAN AMERICAN: 55 ML/MIN/1.73
GLUCOSE BLD-MCNC: 194 MG/DL (ref 74–99)
HCT VFR BLD CALC: 42.6 % (ref 37–54)
HEMOGLOBIN: 13.5 G/DL (ref 12.5–16.5)
IMMATURE GRANULOCYTES #: 0.02 E9/L
IMMATURE GRANULOCYTES %: 0.3 % (ref 0–5)
LYMPHOCYTES ABSOLUTE: 1.18 E9/L (ref 1.5–4)
LYMPHOCYTES RELATIVE PERCENT: 19 % (ref 20–42)
MAGNESIUM: 2.2 MG/DL (ref 1.6–2.6)
MCH RBC QN AUTO: 28.4 PG (ref 26–35)
MCHC RBC AUTO-ENTMCNC: 31.7 % (ref 32–34.5)
MCV RBC AUTO: 89.7 FL (ref 80–99.9)
MONOCYTES ABSOLUTE: 0.47 E9/L (ref 0.1–0.95)
MONOCYTES RELATIVE PERCENT: 7.6 % (ref 2–12)
NEUTROPHILS ABSOLUTE: 3.96 E9/L (ref 1.8–7.3)
NEUTROPHILS RELATIVE PERCENT: 63.6 % (ref 43–80)
PDW BLD-RTO: 14.1 FL (ref 11.5–15)
PHOSPHORUS: 3.8 MG/DL (ref 2.5–4.5)
PLATELET # BLD: 189 E9/L (ref 130–450)
PMV BLD AUTO: 11.1 FL (ref 7–12)
POTASSIUM SERPL-SCNC: 4.4 MMOL/L (ref 3.5–5)
PROTEIN PROTEIN: 7 MG/DL (ref 0–12)
PROTEIN/CREAT RATIO: 0.1
PROTEIN/CREAT RATIO: 0.1 (ref 0–0.2)
RBC # BLD: 4.75 E12/L (ref 3.8–5.8)
SODIUM BLD-SCNC: 141 MMOL/L (ref 132–146)
TOTAL PROTEIN: 7.3 G/DL (ref 6.4–8.3)
URIC ACID, SERUM: 5.2 MG/DL (ref 3.4–7)
WBC # BLD: 6.2 E9/L (ref 4.5–11.5)

## 2019-01-09 PROCEDURE — 84100 ASSAY OF PHOSPHORUS: CPT

## 2019-01-09 PROCEDURE — 84550 ASSAY OF BLOOD/URIC ACID: CPT

## 2019-01-09 PROCEDURE — 84156 ASSAY OF PROTEIN URINE: CPT

## 2019-01-09 PROCEDURE — 83735 ASSAY OF MAGNESIUM: CPT

## 2019-01-09 PROCEDURE — 85025 COMPLETE CBC W/AUTO DIFF WBC: CPT

## 2019-01-09 PROCEDURE — 80053 COMPREHEN METABOLIC PANEL: CPT

## 2019-01-09 PROCEDURE — 36415 COLL VENOUS BLD VENIPUNCTURE: CPT

## 2019-01-09 PROCEDURE — 82570 ASSAY OF URINE CREATININE: CPT

## 2019-02-12 ENCOUNTER — HOSPITAL ENCOUNTER (OUTPATIENT)
Age: 69
Discharge: HOME | End: 2019-02-12
Payer: MEDICARE

## 2019-02-12 LAB
ALBUMIN SERPL-MCNC: 4.4 G/DL (ref 3.5–5.2)
ALP BLD-CCNC: 51 U/L (ref 40–129)
ALT SERPL-CCNC: 24 U/L (ref 0–40)
ANION GAP SERPL CALCULATED.3IONS-SCNC: 11 MMOL/L (ref 7–16)
AST SERPL-CCNC: 21 U/L (ref 0–39)
BILIRUB SERPL-MCNC: 0.5 MG/DL (ref 0–1.2)
BUN BLDV-MCNC: 24 MG/DL (ref 8–23)
CALCIUM SERPL-MCNC: 9.3 MG/DL (ref 8.6–10.2)
CHLORIDE BLD-SCNC: 106 MMOL/L (ref 98–107)
CO2: 26 MMOL/L (ref 22–29)
CREAT SERPL-MCNC: 1.4 MG/DL (ref 0.7–1.2)
GFR AFRICAN AMERICAN: >60
GFR NON-AFRICAN AMERICAN: 50 ML/MIN/1.73
GLUCOSE BLD-MCNC: 139 MG/DL (ref 74–99)
POTASSIUM SERPL-SCNC: 4.5 MMOL/L (ref 3.5–5)
SODIUM BLD-SCNC: 143 MMOL/L (ref 132–146)
TOTAL PROTEIN: 7.2 G/DL (ref 6.4–8.3)

## 2019-02-12 PROCEDURE — 36415 COLL VENOUS BLD VENIPUNCTURE: CPT

## 2019-02-12 PROCEDURE — 80053 COMPREHEN METABOLIC PANEL: CPT

## 2019-03-14 ENCOUNTER — NURSE ONLY (OUTPATIENT)
Dept: NON INVASIVE DIAGNOSTICS | Age: 69
End: 2019-03-14
Payer: MEDICARE

## 2019-03-14 DIAGNOSIS — I49.5 TACHY-BRADY SYNDROME (HCC): ICD-10-CM

## 2019-03-14 DIAGNOSIS — Z95.0 PACEMAKER: Primary | ICD-10-CM

## 2019-03-14 PROCEDURE — 93280 PM DEVICE PROGR EVAL DUAL: CPT | Performed by: INTERNAL MEDICINE

## 2019-05-14 ENCOUNTER — OFFICE VISIT (OUTPATIENT)
Dept: CARDIOLOGY CLINIC | Age: 69
End: 2019-05-14
Payer: MEDICARE

## 2019-05-14 VITALS
WEIGHT: 312.6 LBS | SYSTOLIC BLOOD PRESSURE: 122 MMHG | HEART RATE: 72 BPM | RESPIRATION RATE: 16 BRPM | DIASTOLIC BLOOD PRESSURE: 70 MMHG | OXYGEN SATURATION: 95 % | BODY MASS INDEX: 38.07 KG/M2 | HEIGHT: 76 IN

## 2019-05-14 DIAGNOSIS — Z79.4 TYPE 2 DIABETES MELLITUS WITH STAGE 3 CHRONIC KIDNEY DISEASE, WITH LONG-TERM CURRENT USE OF INSULIN (HCC): ICD-10-CM

## 2019-05-14 DIAGNOSIS — I48.0 PAROXYSMAL ATRIAL FIBRILLATION (HCC): ICD-10-CM

## 2019-05-14 DIAGNOSIS — I49.5 SINUS NODE DYSFUNCTION (HCC): ICD-10-CM

## 2019-05-14 DIAGNOSIS — I50.42 CHRONIC COMBINED SYSTOLIC AND DIASTOLIC CONGESTIVE HEART FAILURE (HCC): ICD-10-CM

## 2019-05-14 DIAGNOSIS — G47.33 OBSTRUCTIVE SLEEP APNEA: ICD-10-CM

## 2019-05-14 DIAGNOSIS — N18.30 TYPE 2 DIABETES MELLITUS WITH STAGE 3 CHRONIC KIDNEY DISEASE, WITH LONG-TERM CURRENT USE OF INSULIN (HCC): ICD-10-CM

## 2019-05-14 DIAGNOSIS — I48.3 TYPICAL ATRIAL FLUTTER (HCC): ICD-10-CM

## 2019-05-14 DIAGNOSIS — E66.8 MODERATE OBESITY: ICD-10-CM

## 2019-05-14 DIAGNOSIS — E78.2 MIXED HYPERLIPIDEMIA: ICD-10-CM

## 2019-05-14 DIAGNOSIS — I42.8 NONISCHEMIC CARDIOMYOPATHY (HCC): Primary | ICD-10-CM

## 2019-05-14 DIAGNOSIS — E11.22 TYPE 2 DIABETES MELLITUS WITH STAGE 3 CHRONIC KIDNEY DISEASE, WITH LONG-TERM CURRENT USE OF INSULIN (HCC): ICD-10-CM

## 2019-05-14 PROCEDURE — 93000 ELECTROCARDIOGRAM COMPLETE: CPT | Performed by: INTERNAL MEDICINE

## 2019-05-14 PROCEDURE — 99214 OFFICE O/P EST MOD 30 MIN: CPT | Performed by: INTERNAL MEDICINE

## 2019-05-14 SDOH — HEALTH STABILITY: MENTAL HEALTH: HOW OFTEN DO YOU HAVE A DRINK CONTAINING ALCOHOL?: MONTHLY OR LESS

## 2019-05-14 SDOH — HEALTH STABILITY: MENTAL HEALTH: HOW MANY STANDARD DRINKS CONTAINING ALCOHOL DO YOU HAVE ON A TYPICAL DAY?: 1 OR 2

## 2019-05-14 NOTE — PATIENT INSTRUCTIONS
Patient Education        A Healthy Heart: Care Instructions  Your Care Instructions    Heart disease occurs when a substance called plaque builds up in the vessels that supply oxygen-rich blood to your heart. This can narrow the blood vessels and reduce blood flow. A heart attack happens when blood flow is completely blocked. A high-fat diet, smoking, and other factors increase the risk of heart disease. Your doctor has found that you have a chance of having heart disease. You can do lots of things to keep your heart healthy. It may not be easy, but you can change your diet, exercise more, and quit smoking. These steps really work to lower your chance of heart disease. Follow-up care is a key part of your treatment and safety. Be sure to make and go to all appointments, and call your doctor if you are having problems. It's also a good idea to know your test results and keep a list of the medicines you take. How can you care for yourself at home? Diet    · Use less salt when you cook and eat. This helps lower your blood pressure. Taste food before salting. Add only a little salt when you think you need it. With time, your taste buds will adjust to less salt.     · Eat fewer snack items, fast foods, canned soups, and other high-salt, high-fat, processed foods.     · Read food labels and try to avoid saturated and trans fats. They increase your risk of heart disease by raising cholesterol levels.     · Limit the amount of solid fat-butter, margarine, and shortening-you eat. Use olive, peanut, or canola oil when you cook. Bake, broil, and steam foods instead of frying them.     · Eating fish can lower your risk for heart disease. Eat at least 2 servings of fish a week. [place], mackerel, herring, sardines, and chunk light tuna are very good choices. These fish contain omega-3 fatty acids.     · Eat a variety of fruit and vegetables every day.  Dark green, deep orange, red, or yellow fruits and vegetables are especially good for you. Examples include spinach, carrots, peaches, and berries.     · Foods high in fiber can reduce your cholesterol and provide important vitamins and minerals. High-fiber foods include whole-grain cereals and breads, oatmeal, beans, brown rice, citrus fruits, and apples.     · Limit drinks and foods with added sugar. These include candy, desserts, and soda pop.    Lifestyle changes    · If your doctor recommends it, get more exercise. Walking is a good choice. Bit by bit, increase the amount you walk every day. Try for at least 30 minutes on most days of the week. You also may want to swim, bike, or do other activities.     · Do not smoke. If you need help quitting, talk to your doctor about stop-smoking programs and medicines. These can increase your chances of quitting for good. Quitting smoking may be the most important step you can take to protect your heart. It is never too late to quit. You will get health benefits right away.     · Limit alcohol to 2 drinks a day for men and 1 drink a day for women. Too much alcohol can cause health problems. Medicines    · Take your medicines exactly as prescribed. Call your doctor if you think you are having a problem with your medicine.     · If your doctor recommends aspirin, take the amount directed each day. Make sure you take aspirin and not another kind of pain reliever, such as acetaminophen (Tylenol). If you take ibuprofen (such as Advil or Motrin) for other problems, take aspirin at least 2 hours before taking ibuprofen. When should you call for help? Call 911 if you have symptoms of a heart attack.  These may include:    · Chest pain or pressure, or a strange feeling in the chest.     · Sweating.     · Shortness of breath.     · Pain, pressure, or a strange feeling in the back, neck, jaw, or upper belly or in one or both shoulders or arms.     · Lightheadedness or sudden weakness.     · A fast or irregular heartbeat.    After you call 911, the  may tell you to chew 1 adult-strength or 2 to 4 low-dose aspirin. Wait for an ambulance. Do not try to drive yourself.   Watch closely for changes in your health, and be sure to contact your doctor if you have any problems. Where can you learn more? Go to https://chpepiceweb.[email or web address]. org and sign in to your A[employer] account. Enter L718 in the [employer] box to learn more about \"A Healthy Heart: Care Instructions. \"     If you do not have an account, please click on the \"Sign Up Now\" link. Current as of: July 22, 2018  Content Version: 12.0  © 5117-3234 [employer]. Care instructions adapted under license by [facility] ([facility]). If you have questions about a medical condition or this instruction, always ask your healthcare professional. [address] any warranty or liability for your use of this information.

## 2019-05-14 NOTE — PROGRESS NOTES
OUTPATIENT CARDIOLOGY FOLLOW-UP    Name: Sarah Morrison    Age: 78 y.o. Primary Care Physician: Ling White DO    Date of Service: 5/14/2019    Chief Complaint: Nonischemic cardiomyopathy, paroxysmal atrial fibrillation/flutter with associated sinus node dysfunction, hypertension, chronic kidney disease, moderate obesity, obstructive sleep apnea    Interim History: Since his most recent evaluation, the patient remains stable from a cardiovascular standpoint and denies active cardiovascular symptoms including the absence of manifestations of decompensated left systolic dysfunction or volume overload. He has noted no arrhythmia related symptoms and was evaluated by the electrophysiology service at the [facility] at his request with confirmation by their electrophysiologist of his present recommended approach. At that time, device interrogation demonstrated appropriate pacemaker function with a limited atrial fibrillation burden. He has made no progress in lifestyle modification, particularly related to weight reduction while remaining normotensive with acceptable control of his diabetes and glycosylated hemoglobin levels of approximately 7%. He relates no symptoms of a focal neurologic origin nor bleeding in the face of chronic oral anticoagulation. Review of Systems: The remainder of a complete multisystem review including consitutional, central nervous, respiratory, circulatory, gastrointestinal, genitourinary, endocrinologic, hematologic, musculoskeletal and psychiatric are negative.     Past Medical History:  Past Medical History:   Diagnosis Date    Arthritis     Asbestos exposure     Atrial fibrillation ([facility] [address] 07/2016    w/RVR    BPH (benign prostatic hyperplasia)     Cardiomyopathy ([facility] [address]     CHF (congestive heart failure) (HCC)     CKD (chronic kidney disease)     Diabetes mellitus ([facility] [address]     History of cardioversion 12/04/2018    admitted for [facility] therapy    Hyperlipidemia     Hypertension     Obesity     Sleep apnea        Past Surgical History:  Past Surgical History:   Procedure Laterality Date    ATRIAL ABLATION SURGERY  2017    atrial fib    CARDIOVERSION  2016    CARDIOVERSION  11/15/2016    D[facility]V    CARDIOVERSION  2017    Dr. Mike Merlos  2017    NASAL SEPTUM SURGERY  1974    PACEMAKER INSERTION  2018    D-PPM  ([employer])  [employer]    ROTATOR CUFF REPAIR Right 2015    TRANSESOPHAGEAL ECHOCARDIOGRAM  2017       Family History:  Family History   Problem Relation Age of Onset    Alcohol Abuse Mother     Alcohol Abuse Father     Cancer Brother         prostate    Heart Disease Maternal Grandfather        Social History:  Social History     Socioeconomic History    Marital status:      Spouse name: Not on file    Number of children: Not on file    Years of education: Not on file    Highest education level: Not on file   Occupational History    Not on file   Social Needs    Financial resource strain: Not on file    Food insecurity:     Worry: Not on file     Inability: Not on file    Transportation needs:     Medical: Not on file     Non-medical: Not on file   Tobacco Use    Smoking status: Former Smoker     Packs/day: 1.00     Years: 5.00     Pack years: 5.00     Types: Cigarettes     Last attempt to quit: 1972     Years since quittin.3    Smokeless tobacco: Never Used   Substance and Sexual Activity    Alcohol use: Yes     Frequency: Monthly or less     Drinks per session: 1 or 2     Binge frequency: Never     Comment: rare beer/wine;  drinks 4 cups of coffee daily    Drug use: Never    Sexual activity: Not on file   Lifestyle    Physical activity:     Days per week: Not on file     Minutes per session: Not on file    Stress: Not on file   Relationships    Social connections:     Talks on phone: Not on file     Gets together: Not on file     Attends [employer] service: Not on file     Active member of club or organization: Not on file     Attends meetings of clubs or organizations: Not on file     Relationship status: Not on file    Intimate partner violence:     Fear of current or ex partner: Not on file     Emotionally abused: Not on file     Physically abused: Not on file     Forced sexual activity: Not on file   Other Topics Concern    Not on file   Social History Narrative    Drinks 1 pot of coffee daily.         Allergies:  No Known Allergies    Current Medications:  Current Outpatient Medications   Medication Sig Dispense Refill    dofetilide (TIKOSYN) 125 MCG capsule Take 1 capsule by mouth every 12 hours 180 capsule 3    magnesium oxide (MAG-OX) 400 (240 Mg) MG tablet Take 1 tablet by mouth daily 30 tablet 5    lisinopril (PRINIVIL;ZESTRIL) 5 MG tablet Take 5 mg by mouth daily      metFORMIN (GLUCOPHAGE) 1000 MG tablet Take 1 tablet by mouth 2 times daily (with meals) 60 tablet 3    metoprolol succinate (TOPROL XL) 50 MG extended release tablet Take 1 tablet by mouth 2 times daily 180 tablet 3    linagliptin (TRADJENTA) 5 MG tablet Take 5 mg by mouth daily      Glucosamine 500 MG CAPS Take 2,000 mg by mouth daily       rivaroxaban (XARELTO) 20 MG TABS tablet Take 1 tablet by mouth daily 90 tablet 3    JARDIANCE 25 MG tablet Take 25 mg by mouth daily       glipiZIDE (GLUCOTROL) 10 MG tablet Take 10 mg by mouth 2 times daily (before meals)       LANTUS SOLOSTAR 100 UNIT/ML injection pen Inject 25 Units into the skin nightly       CIALIS 5 MG tablet Take 5 mg by mouth daily as needed       furosemide (LASIX) 20 MG tablet TAKE 1 TABLET TWICE A  tablet 3    atorvastatin (LIPITOR) 40 MG tablet Take 40 mg by mouth daily       fenofibric acid (FIBRICOR) 135 MG CPDR capsule Take 135 mg by mouth daily       Multiple Vitamin (MULTI VITAMIN DAILY PO) Take by mouth daily      vitamin D (CHOLECALCIFEROL) 1000 UNIT TABS tablet Take 2,000 Units by mouth daily       CPAP Machine MISC by Does not apply route nightly      tamsulosin (FLOMAX) 0.4 MG capsule Take 0.4 mg by mouth daily. No current facility-administered medications for this visit. Physical Exam:  /70   Pulse 72   Resp 16   Ht 6' 4\" (1.93 m)   Wt (!) 312 lb 9.6 oz (141.8 kg)   SpO2 95%   BMI 38.05 kg/m²   Wt Readings from Last 3 Encounters:   05/14/19 (!) 312 lb 9.6 oz (141.8 kg)   01/07/19 (!) 306 lb (138.8 kg)   12/07/18 (!) 308 lb (139.7 kg)     The patient is awake, alert and in no discomfort or distress. No gross musculoskeletal deformity is present. No significant skin or nail changes are present. Gross examination of head, eyes, nose and throat are negative. Jugular venous pressure is normal and no carotid bruits are present. Normal respiratory effort is noted with no accessory muscle usage present. Lung fields are clear to ascultation. Cardiac examination is notable for a regular rate and rhythm with no palpable thrill. No gallop rhythm or cardiac murmur are identified. A benign abdominal examination is present with the exception of obesity and no masses or organomegaly. Intact pulses are present throughout all extremities and no peripheral edema is present. No focal neurologic deficits are present. Laboratory Tests:  Lab Results   Component Value Date    CREATININE 1.4 (H) 02/12/2019    BUN 24 (H) 02/12/2019     02/12/2019    K 4.5 02/12/2019     02/12/2019    CO2 26 02/12/2019     No results found for: BNP  Lab Results   Component Value Date    WBC 6.2 01/09/2019    RBC 4.75 01/09/2019    HGB 13.5 01/09/2019    HCT 42.6 01/09/2019    MCV 89.7 01/09/2019    MCH 28.4 01/09/2019    MCHC 31.7 01/09/2019    RDW 14.1 01/09/2019     01/09/2019    MPV 11.1 01/09/2019     No results for input(s): ALKPHOS, ALT, AST, PROT, BILITOT, BILIDIR, LABALBU in the last 72 hours.   Lab Results   Component Value Date    MG 2.2 01/09/2019     Lab Results   Component Value Date PROTIME 15.7 12/05/2018    INR 1.4 12/05/2018     Lab Results   Component Value Date    TSH 2.640 06/27/2018     No components found for: CHLPL  Lab Results   Component Value Date    TRIG 103 06/27/2018     Lab Results   Component Value Date    HDL 39 06/27/2018     Lab Results   Component Value Date    LDLCALC 49 06/27/2018       Cardiac Tests:  ECG: A resting electrocardiogram demonstrates evidence of sinus rhythm with occasional supraventricular ectopy with low voltage within the limb leads, left axis deviation and a corrected QT interval of approximately 435 ms      ASSESSMENT / PLAN:  On a clinical basis, the patient presently remains stable from a cardiovascular standpoint and compensated in the face of his nonischemic cardiomyopathy and atrial arrhythmias with maintenance of sinus rhythm. I have presently maintained his existing medical regimen with continued to attempt to promote weight reduction to benefit diastolic cardiac performance is well as to assist in management of his obstructive sleep apnea and attempt to reduce risk of recurrent atrial arrhythmias. This will additionally benefit aggressive risk factor modification of blood pressure, diabetes and serum lipids and attempt to reduce risk of atherosclerotic development. He need monitoring of his renal function electrolytes will be necessary to optimize dosing of antiarrhythmic therapy and anticoagulation. He will continue to undergo monitoring of his permanent pacemaker with the electrophysiology service. I present plan his clinical reassessment of proximally 6 months and would happily reevaluate him in the interim should additional cardiovascular difficulties or concerns arise. The patient's current medication list, allergies, problem list and results of all previously ordered testing were reviewed at today's visit. Follow-up office visit in 6 months      Note: This report was completed using computerized voice recognition software.  Every effort has been made to ensure accuracy, however; and invert and computerized transcription errors may be present. Nicolas Shock.  Mayuri Mal[place], [address] [facility]    An electronic copy of this follow-up progress note was forwarded to Dr. Nabil Nowak

## 2019-07-02 RX ORDER — RIVAROXABAN 20 MG/1
TABLET, FILM COATED ORAL
Qty: 90 TABLET | Refills: 3 | Status: SHIPPED | OUTPATIENT
Start: 2019-07-02 | End: 2020-09-17 | Stop reason: SDUPTHER

## 2019-09-11 NOTE — PROGRESS NOTES
Constitutional: Oriented to person, place, and time. Well-developed and cooperative. Head: Normocephalic and atraumatic. Eyes: Conjunctivae are normal.    Neck: No hepatojugular reflux and no JVD present. Carotid bruit is not present. Cardiovascular: S1 normal, S2 normal and intact distal pulses. A regular rhythm present. PMI is not displaced. Pulmonary/Chest: Effort normal and breath sounds normal. No respiratory distress. Abdominal: Obese. Soft. Normal appearance and bowel sounds are normal.  No tenderness. Musculoskeletal: Normal range of motion of all extremities, no muscle weakness. Neurological: Alert and oriented to person, place, and time. Gait normal.   Skin: Skin is warm and dry. No bruising, no ecchymosis and no rash noted. Extremity: No clubbing or cyanosis. No edema. Psychiatric: Normal mood and affect. Thought content normal.     Pertinent Labs:  CBC:   No results for input(s): WBC, HGB, HCT, PLT in the last 72 hours. BMP:  No results for input(s): NA, K, CL, CO2, BUN, CREATININE, CALCIUM in the last 72 hours.     Invalid input(s): GLU  TSH:   Lab Results   Component Value Date    TSH 2.640 06/27/2018        Pertinent Cardiac Testing:     ECHO Complete 2D W Doppler W Color 3/22/18  Details     Reading Physician Reading Date Result Priority   Heather Taylor MD 3/22/2018    Narrative     Transthoracic Echocardiography Report (TTE)     Demographics      Patient Name       Jason CLIFFORD      Medical Record     [id number]         Room Number   Number      Account #         [de-identified]        Procedure Date       03/22/2018      Corporate ID                        Ordering Physician   Shannon Rutherford MD      Accession Number   [id number]        Referring Physician Shannon Calderon      Date of Birth      1950       Sonographer          [facility]      Age Voltage/Longevity:  12 years    Pacing: A: 87.6%  RV: <1% %  P wave: paced Impedance: 513 ohms   Threshold: 0.75 V @ 0.4 ms  RV R wave: 7.9 mV  Impedance: 741 ohms   Threshold: 0.5 V @ 0.4 ms  Episodes: AF burden: <1%  - 1 AF episodes on 5/27/19, Vrate: 104, lasting 6 hours  - 2 fast A+Vs 7-13 seconds,  bpm  - 29 VT NS, all appear to be SVT, all 2-10 seconds, rates 150-207bpm  Reprogramming included: see below  Overall device function is normal    All device programmable settings were evaluated per above and in the scanned document, along with iterative adjustments (capture thresholds) to assess and select the most appropriate final programming to provide for consistent delivery of the appropriate therapy and to verify function of the device. Impression:    1. Persistent atrial fibrillation  - Symptomatic.  - Found on 7/2016.  - KEVIN/DC-CV and Tikosn initiation in 1/2017  - PVI + LA + RA rotor, SVC isolation, CTI flutter and LA roof linear ablation 11/2017  - Dofetilide stopping in 6/2018 with recurrent persistent AF on 9/2018. - Tikosyn initiation in December 2018. - Presents today in SR with stable QTc.  - Re-education on importance of well controlled HTN (goal BP < 130/80), adequate weight control (goal BMI of < 27), physical activity consisting of moderate cardiopulmonary exercise up to a goal of 250 min/wk, daily compliance with CPAP in treating sleep apnea, smoking cessation and limited ETOH intake.      2. Tachy-vaughn syndrome  - Sinus node dysfunction with sinus arrest >5 seconds and need for Beta-blocker and Dofetilide therapy for rapid AF  - S/p PPM insertion 12/6/18     3. PPM in situ  - Indication: SND  - DOI 12/6/18.  - Normal device function. 4. Chronic systolic CHF  - LV EF 14-40% on TTE 12/19/16.  - LV EF 40-45% on TTE 1/31/17.  - LV EF 40% on KEVIN 11/6/17.  - LV EF 50-55% on TTE 3/22/18.  - NYHA class II, stage C  - Continue GDMT including Toprol XL, Zestril and Lasix.     5.  CARLO  -

## 2019-09-18 ENCOUNTER — OFFICE VISIT (OUTPATIENT)
Dept: NON INVASIVE DIAGNOSTICS | Age: 69
End: 2019-09-18
Payer: MEDICARE

## 2019-09-18 VITALS
HEART RATE: 70 BPM | HEIGHT: 76 IN | DIASTOLIC BLOOD PRESSURE: 78 MMHG | BODY MASS INDEX: 37.26 KG/M2 | WEIGHT: 306 LBS | RESPIRATION RATE: 20 BRPM | SYSTOLIC BLOOD PRESSURE: 124 MMHG

## 2019-09-18 DIAGNOSIS — I48.19 PERSISTENT ATRIAL FIBRILLATION (HCC): Primary | ICD-10-CM

## 2019-09-18 DIAGNOSIS — Z95.0 PACEMAKER: ICD-10-CM

## 2019-09-18 DIAGNOSIS — I49.5 TACHY-BRADY SYNDROME (HCC): ICD-10-CM

## 2019-09-18 PROCEDURE — 99214 OFFICE O/P EST MOD 30 MIN: CPT | Performed by: INTERNAL MEDICINE

## 2019-09-18 PROCEDURE — 93280 PM DEVICE PROGR EVAL DUAL: CPT | Performed by: INTERNAL MEDICINE

## 2019-09-18 RX ORDER — SILDENAFIL 100 MG/1
TABLET, FILM COATED ORAL PRN
COMMUNITY
End: 2022-05-26 | Stop reason: ALTCHOICE

## 2019-09-24 ENCOUNTER — HOSPITAL ENCOUNTER (OUTPATIENT)
Age: 69
Discharge: HOME | End: 2019-09-24
Payer: MEDICARE

## 2019-09-24 DIAGNOSIS — I48.19 PERSISTENT ATRIAL FIBRILLATION (HCC): ICD-10-CM

## 2019-09-24 LAB
ALBUMIN SERPL-MCNC: 4.2 G/DL (ref 3.5–5.2)
ALP BLD-CCNC: 56 U/L (ref 40–129)
ALT SERPL-CCNC: 18 U/L (ref 0–40)
ANION GAP SERPL CALCULATED.3IONS-SCNC: 13 MMOL/L (ref 7–16)
AST SERPL-CCNC: 22 U/L (ref 0–39)
BILIRUB SERPL-MCNC: 0.4 MG/DL (ref 0–1.2)
BUN BLDV-MCNC: 24 MG/DL (ref 8–23)
CALCIUM SERPL-MCNC: 9.1 MG/DL (ref 8.6–10.2)
CHLORIDE BLD-SCNC: 104 MMOL/L (ref 98–107)
CHOLESTEROL, TOTAL: 119 MG/DL (ref 0–199)
CO2: 25 MMOL/L (ref 22–29)
CREAT SERPL-MCNC: 1.4 MG/DL (ref 0.7–1.2)
CREATININE URINE: 99 MG/DL (ref 40–278)
GFR AFRICAN AMERICAN: >60
GFR NON-AFRICAN AMERICAN: 50 ML/MIN/1.73
GLUCOSE BLD-MCNC: 153 MG/DL (ref 74–99)
HDLC SERPL-MCNC: 41 MG/DL
LDL CHOLESTEROL CALCULATED: 54 MG/DL (ref 0–99)
MAGNESIUM: 2.3 MG/DL (ref 1.6–2.6)
MICROALBUMIN UR-MCNC: <12 MG/L
MICROALBUMIN/CREAT UR-RTO: ABNORMAL (ref 0–30)
POTASSIUM SERPL-SCNC: 5.4 MMOL/L (ref 3.5–5)
SODIUM BLD-SCNC: 142 MMOL/L (ref 132–146)
TOTAL PROTEIN: 7.2 G/DL (ref 6.4–8.3)
TRIGL SERPL-MCNC: 119 MG/DL (ref 0–149)
TSH SERPL DL<=0.05 MIU/L-ACNC: 2.32 UIU/ML (ref 0.27–4.2)
VLDLC SERPL CALC-MCNC: 24 MG/DL

## 2019-09-24 PROCEDURE — 80053 COMPREHEN METABOLIC PANEL: CPT

## 2019-09-24 PROCEDURE — 80061 LIPID PANEL: CPT

## 2019-09-24 PROCEDURE — 82044 UR ALBUMIN SEMIQUANTITATIVE: CPT

## 2019-09-24 PROCEDURE — 36415 COLL VENOUS BLD VENIPUNCTURE: CPT

## 2019-09-24 PROCEDURE — 82570 ASSAY OF URINE CREATININE: CPT

## 2019-09-24 PROCEDURE — 83735 ASSAY OF MAGNESIUM: CPT

## 2019-09-24 PROCEDURE — 84443 ASSAY THYROID STIM HORMONE: CPT

## 2019-09-25 ENCOUNTER — TELEPHONE (OUTPATIENT)
Dept: NON INVASIVE DIAGNOSTICS | Age: 69
End: 2019-09-25

## 2019-09-25 NOTE — TELEPHONE ENCOUNTER
----- Message from Marianne Krueger MD sent at 9/24/2019 12:27 PM EDT -----  Please let hm know that Cr is stable but potassium is high. Need to decrease potassium intake and follow up with PCP for blood test in 1 to 2 weeks.  Thanks.  ----- Message -----  From: Minerva Cancino Incoming Results From [employer]  Sent: 9/24/2019  12:03 PM EDT  To: Marianne Krueger MD

## 2019-11-22 RX ORDER — FUROSEMIDE 20 MG/1
TABLET ORAL
Qty: 180 TABLET | Refills: 3 | Status: SHIPPED
Start: 2019-11-22 | End: 2020-11-09

## 2019-11-25 RX ORDER — DOFETILIDE 0.12 MG/1
125 CAPSULE ORAL EVERY 12 HOURS SCHEDULED
Qty: 180 CAPSULE | Refills: 3 | Status: SHIPPED
Start: 2019-11-25 | End: 2021-01-04 | Stop reason: SDUPTHER

## 2019-12-18 ENCOUNTER — NURSE ONLY (OUTPATIENT)
Dept: NON INVASIVE DIAGNOSTICS | Age: 69
End: 2019-12-18
Payer: MEDICARE

## 2019-12-18 PROCEDURE — 93294 REM INTERROG EVL PM/LDLS PM: CPT | Performed by: INTERNAL MEDICINE

## 2019-12-18 PROCEDURE — 93296 REM INTERROG EVL PM/IDS: CPT | Performed by: INTERNAL MEDICINE

## 2020-01-06 NOTE — PROGRESS NOTES
See [employer] [place] report. Remote monitoring reviewed over a 90 day period. End of 90 day monitoring period date of service 12-18-19.

## 2020-01-07 ENCOUNTER — TELEPHONE (OUTPATIENT)
Dept: NON INVASIVE DIAGNOSTICS | Age: 70
End: 2020-01-07

## 2020-01-13 RX ORDER — METOPROLOL SUCCINATE 50 MG/1
TABLET, EXTENDED RELEASE ORAL
Qty: 180 TABLET | Refills: 3 | Status: SHIPPED
Start: 2020-01-13 | End: 2020-12-17 | Stop reason: SDUPTHER

## 2020-01-15 ENCOUNTER — HOSPITAL ENCOUNTER (OUTPATIENT)
Age: 70
Discharge: HOME | End: 2020-01-15
Payer: MEDICARE

## 2020-01-15 LAB
ALBUMIN SERPL-MCNC: 4.2 G/DL (ref 3.5–5.2)
ALP BLD-CCNC: 51 U/L (ref 40–129)
ALT SERPL-CCNC: 21 U/L (ref 0–40)
ANION GAP SERPL CALCULATED.3IONS-SCNC: 13 MMOL/L (ref 7–16)
AST SERPL-CCNC: 27 U/L (ref 0–39)
BASOPHILS ABSOLUTE: 0.02 E9/L (ref 0–0.2)
BASOPHILS RELATIVE PERCENT: 0.3 % (ref 0–2)
BILIRUB SERPL-MCNC: 0.5 MG/DL (ref 0–1.2)
BUN BLDV-MCNC: 23 MG/DL (ref 8–23)
CALCIUM SERPL-MCNC: 8.8 MG/DL (ref 8.6–10.2)
CHLORIDE BLD-SCNC: 106 MMOL/L (ref 98–107)
CHOLESTEROL, TOTAL: 107 MG/DL (ref 0–199)
CO2: 23 MMOL/L (ref 22–29)
CREAT SERPL-MCNC: 1.2 MG/DL (ref 0.7–1.2)
CREATININE URINE: 109 MG/DL (ref 40–278)
CREATININE URINE: 124 MG/DL (ref 40–278)
EOSINOPHILS ABSOLUTE: 0.26 E9/L (ref 0.05–0.5)
EOSINOPHILS RELATIVE PERCENT: 4.5 % (ref 0–6)
GFR AFRICAN AMERICAN: >60
GFR NON-AFRICAN AMERICAN: 60 ML/MIN/1.73
GLUCOSE BLD-MCNC: 109 MG/DL (ref 74–99)
HCT VFR BLD CALC: 40.2 % (ref 37–54)
HDLC SERPL-MCNC: 39 MG/DL
HEMOGLOBIN: 12.9 G/DL (ref 12.5–16.5)
IMMATURE GRANULOCYTES #: 0.02 E9/L
IMMATURE GRANULOCYTES %: 0.3 % (ref 0–5)
LDL CHOLESTEROL CALCULATED: 50 MG/DL (ref 0–99)
LYMPHOCYTES ABSOLUTE: 1.21 E9/L (ref 1.5–4)
LYMPHOCYTES RELATIVE PERCENT: 21 % (ref 20–42)
MAGNESIUM: 2.2 MG/DL (ref 1.6–2.6)
MCH RBC QN AUTO: 30.1 PG (ref 26–35)
MCHC RBC AUTO-ENTMCNC: 32.1 % (ref 32–34.5)
MCV RBC AUTO: 93.7 FL (ref 80–99.9)
MICROALBUMIN UR-MCNC: <12 MG/L
MICROALBUMIN/CREAT UR-RTO: ABNORMAL (ref 0–30)
MONOCYTES ABSOLUTE: 0.54 E9/L (ref 0.1–0.95)
MONOCYTES RELATIVE PERCENT: 9.4 % (ref 2–12)
NEUTROPHILS ABSOLUTE: 3.7 E9/L (ref 1.8–7.3)
NEUTROPHILS RELATIVE PERCENT: 64.5 % (ref 43–80)
PDW BLD-RTO: 14.3 FL (ref 11.5–15)
PHOSPHORUS: 3.6 MG/DL (ref 2.5–4.5)
PLATELET # BLD: 161 E9/L (ref 130–450)
PMV BLD AUTO: 10.1 FL (ref 7–12)
POTASSIUM SERPL-SCNC: 4.2 MMOL/L (ref 3.5–5)
PROTEIN PROTEIN: 7 MG/DL (ref 0–12)
PROTEIN/CREAT RATIO: 0.1
PROTEIN/CREAT RATIO: 0.1 (ref 0–0.2)
RBC # BLD: 4.29 E12/L (ref 3.8–5.8)
SODIUM BLD-SCNC: 142 MMOL/L (ref 132–146)
TOTAL PROTEIN: 7.1 G/DL (ref 6.4–8.3)
TRIGL SERPL-MCNC: 91 MG/DL (ref 0–149)
TSH SERPL DL<=0.05 MIU/L-ACNC: 2.44 UIU/ML (ref 0.27–4.2)
URIC ACID, SERUM: 6.3 MG/DL (ref 3.4–7)
VLDLC SERPL CALC-MCNC: 18 MG/DL
WBC # BLD: 5.8 E9/L (ref 4.5–11.5)

## 2020-01-15 PROCEDURE — 82044 UR ALBUMIN SEMIQUANTITATIVE: CPT

## 2020-01-15 PROCEDURE — 84100 ASSAY OF PHOSPHORUS: CPT

## 2020-01-15 PROCEDURE — 83735 ASSAY OF MAGNESIUM: CPT

## 2020-01-15 PROCEDURE — 36415 COLL VENOUS BLD VENIPUNCTURE: CPT

## 2020-01-15 PROCEDURE — 82570 ASSAY OF URINE CREATININE: CPT

## 2020-01-15 PROCEDURE — 84550 ASSAY OF BLOOD/URIC ACID: CPT

## 2020-01-15 PROCEDURE — 85025 COMPLETE CBC W/AUTO DIFF WBC: CPT

## 2020-01-15 PROCEDURE — 80061 LIPID PANEL: CPT

## 2020-01-15 PROCEDURE — 80053 COMPREHEN METABOLIC PANEL: CPT

## 2020-01-15 PROCEDURE — 84443 ASSAY THYROID STIM HORMONE: CPT

## 2020-01-15 PROCEDURE — 84156 ASSAY OF PROTEIN URINE: CPT

## 2020-02-11 ENCOUNTER — OFFICE VISIT (OUTPATIENT)
Dept: CARDIOLOGY CLINIC | Age: 70
End: 2020-02-11
Payer: MEDICARE

## 2020-02-11 VITALS
RESPIRATION RATE: 20 BRPM | HEART RATE: 64 BPM | BODY MASS INDEX: 37.63 KG/M2 | WEIGHT: 309 LBS | SYSTOLIC BLOOD PRESSURE: 150 MMHG | HEIGHT: 76 IN | DIASTOLIC BLOOD PRESSURE: 66 MMHG

## 2020-02-11 PROCEDURE — 93000 ELECTROCARDIOGRAM COMPLETE: CPT | Performed by: INTERNAL MEDICINE

## 2020-02-11 PROCEDURE — 99214 OFFICE O/P EST MOD 30 MIN: CPT | Performed by: INTERNAL MEDICINE

## 2020-02-11 NOTE — PROGRESS NOTES
OUTPATIENT CARDIOLOGY FOLLOW-UP    Name: Leonie Lee    Age: 78 y.o. Primary Care Physician: Tacos Yuan DO    Date of Service: 2/11/2020    Chief Complaint: Paroxysmal atrial fibrillation, sinus node dysfunction, nonischemic cardiomyopathy, moderate obesity, obstructive sleep apnea    Interim History: Since his most recent evaluation, the patient remains stable from a cardiovascular standpoint and denies interim arrhythmia related symptoms decompensation of left ventricular systolic dysfunction or volume overload. Pacemaker interrogations have demonstrated appropriate device function with a predominance of atrial pacing and a minimal atrial fibrillation burden. While on the day of assessment evidence of systolic hypertension is noted, he relates acceptable blood pressures have been maintained on additional office and home blood pressure monitoring in spite the absence of weight reduction. His diabetes has remained acceptable with glycosylated hemoglobin levels below 6% and he is been compliant with the use of his nocturnal CPAP. Review of Systems: The remainder of a complete multisystem review including consitutional, central nervous, respiratory, circulatory, gastrointestinal, genitourinary, endocrinologic, hematologic, musculoskeletal and psychiatric are negative.     Past Medical History:  Past Medical History:   Diagnosis Date    Arthritis     Asbestos exposure     Atrial fibrillation ([facility] [address] 07/2016    w/RVR    BPH (benign prostatic hyperplasia)     Cardiomyopathy ([facility] [address]     CHF (congestive heart failure) (HCC)     CKD (chronic kidney disease)     Diabetes mellitus ([facility] [address]     History of cardioversion 12/04/2018    admitted for [facility] therapy    Hyperlipidemia     Hypertension     Obesity     Sleep apnea        Past Surgical History:  Past Surgical History:   Procedure Laterality Date    ATRIAL ABLATION SURGERY  11/06/2017    atrial fib    CARDIOVERSION  08/23/2016    CARDIOVERSION  11/15/2016    DCCV    CARDIOVERSION  2017    Dr. Derek Cadena  2017    NASAL SEPTUM SURGERY  1974    PACEMAKER INSERTION  2018    D-PPM  ([employer])  [facility]    ROTATOR CUFF REPAIR Right 2015    TRANSESOPHAGEAL ECHOCARDIOGRAM  2017       Family History:  Family History   Problem Relation Age of Onset    Alcohol Abuse Mother     Alcohol Abuse Father     Cancer Brother         prostate    Heart Disease Maternal Grandfather        Social History:  Social History     Socioeconomic History    Marital status:      Spouse name: Not on file    Number of children: Not on file    Years of education: Not on file    Highest education level: Not on file   Occupational History    Not on file   Social Needs    Financial resource strain: Not on file    Food insecurity:     Worry: Not on file     Inability: Not on file    Transportation needs:     Medical: Not on file     Non-medical: Not on file   Tobacco Use    Smoking status: Former Smoker     Packs/day: 1.00     Years: 5.00     Pack years: 5.00     Types: Cigarettes     Last attempt to quit: 1972     Years since quittin.1    Smokeless tobacco: Never Used   Substance and Sexual Activity    Alcohol use: Yes     Frequency: Monthly or less     Drinks per session: 1 or 2     Binge frequency: Never     Comment: occasionally- 3 beer/wine a week     Drug use: Never    Sexual activity: Not on file   Lifestyle    Physical activity:     Days per week: Not on file     Minutes per session: Not on file    Stress: Not on file   Relationships    Social connections:     Talks on phone: Not on file     Gets together: Not on file     Attends [employer] service: Not on file     Active member of club or organization: Not on file     Attends meetings of clubs or organizations: Not on file     Relationship status: Not on file    Intimate partner violence:     Fear of current or ex partner: Not on file Emotionally abused: Not on file     Physically abused: Not on file     Forced sexual activity: Not on file   Other Topics Concern    Not on file   Social History Narrative    Drinks 1 pot of coffee daily. Allergies:  No Known Allergies    Current Medications:  Current Outpatient Medications   Medication Sig Dispense Refill    metoprolol succinate (TOPROL XL) 50 MG extended release tablet TAKE 1 TABLET TWICE A  tablet 3    dofetilide (TIKOSYN) 125 MCG capsule Take 1 capsule by mouth every 12 hours 180 capsule 3    furosemide (LASIX) 20 MG tablet TAKE 1 TABLET TWICE A DAY (Patient taking differently: Take 20 mg by mouth daily ) 180 tablet 3    NONFORMULARY CBD Gummies      sildenafil (VIAGRA) 100 MG tablet as needed       XARELTO 20 MG TABS tablet TAKE 1 TABLET DAILY 90 tablet 3    magnesium oxide (MAG-OX) 400 (240 Mg) MG tablet Take 1 tablet by mouth daily 30 tablet 5    lisinopril (PRINIVIL;ZESTRIL) 10 MG tablet Take 10 mg by mouth daily       metFORMIN (GLUCOPHAGE) 1000 MG tablet Take 1 tablet by mouth 2 times daily (with meals) 60 tablet 3    linagliptin (TRADJENTA) 5 MG tablet Take 5 mg by mouth daily      Glucosamine 500 MG CAPS Take 2,000 mg by mouth daily       JARDIANCE 25 MG tablet Take 25 mg by mouth daily       glipiZIDE (GLUCOTROL) 10 MG tablet Take 10 mg by mouth 2 times daily (before meals)       LANTUS SOLOSTAR 100 UNIT/ML injection pen Inject 25 Units into the skin nightly       CIALIS 5 MG tablet Take 5 mg by mouth daily       atorvastatin (LIPITOR) 40 MG tablet Take 40 mg by mouth daily       fenofibric acid (FIBRICOR) 135 MG CPDR capsule Take 135 mg by mouth daily       Multiple Vitamin (MULTI VITAMIN DAILY PO) Take by mouth daily      vitamin D (CHOLECALCIFEROL) 1000 UNIT TABS tablet Take 2,000 Units by mouth daily       CPAP Machine MISC Inhale into the lungs nightly       tamsulosin (FLOMAX) 0.4 MG capsule Take 0.4 mg by mouth daily.        No current facility-administered medications for this visit. Physical Exam:  BP (!) 150/66   Pulse 64   Resp 20   Ht 6' 4\" (1.93 m)   Wt (!) 309 lb (140.2 kg)   BMI 37.61 kg/m²   Wt Readings from Last 3 Encounters:   02/11/20 (!) 309 lb (140.2 kg)   09/18/19 (!) 306 lb (138.8 kg)   05/14/19 (!) 312 lb 9.6 oz (141.8 kg)     The patient is awake, alert and in no discomfort or distress. No gross musculoskeletal deformity is present. No significant skin or nail changes are present. Gross examination of head, eyes, nose and throat are negative. Jugular venous pressure is normal and no carotid bruits are present. Normal respiratory effort is noted with no accessory muscle usage present. Lung fields are clear to ascultation. Cardiac examination is notable for a regular rate and rhythm with no palpable thrill. No gallop rhythm or cardiac murmur are identified. A benign abdominal examination is present with the exception of obesity and no masses or organomegaly. Intact pulses are present throughout all extremities and no peripheral edema is present. No focal neurologic deficits are present. Laboratory Tests:  Lab Results   Component Value Date    CREATININE 1.2 01/15/2020    BUN 23 01/15/2020     01/15/2020    K 4.2 01/15/2020     01/15/2020    CO2 23 01/15/2020     No results found for: BNP  Lab Results   Component Value Date    WBC 5.8 01/15/2020    RBC 4.29 01/15/2020    HGB 12.9 01/15/2020    HCT 40.2 01/15/2020    MCV 93.7 01/15/2020    MCH 30.1 01/15/2020    MCHC 32.1 01/15/2020    RDW 14.3 01/15/2020     01/15/2020    MPV 10.1 01/15/2020     No results for input(s): ALKPHOS, ALT, AST, PROT, BILITOT, BILIDIR, LABALBU in the last 72 hours.   Lab Results   Component Value Date    MG 2.2 01/15/2020     Lab Results   Component Value Date    PROTIME 15.7 12/05/2018    INR 1.4 12/05/2018     Lab Results   Component Value Date    TSH 2.440 01/15/2020     No components found for: CHLPL  Lab Results Trish Cavazos, [address] [facility] Cardiology    An electronic copy of this follow-up progress note was forwarded to Dr. Derik Marc

## 2020-03-18 ENCOUNTER — NURSE ONLY (OUTPATIENT)
Dept: NON INVASIVE DIAGNOSTICS | Age: 70
End: 2020-03-18
Payer: MEDICARE

## 2020-03-18 PROCEDURE — 93296 REM INTERROG EVL PM/IDS: CPT | Performed by: INTERNAL MEDICINE

## 2020-03-18 PROCEDURE — 93294 REM INTERROG EVL PM/LDLS PM: CPT | Performed by: INTERNAL MEDICINE

## 2020-04-27 ENCOUNTER — TELEPHONE (OUTPATIENT)
Dept: NON INVASIVE DIAGNOSTICS | Age: 70
End: 2020-04-27

## 2020-05-05 NOTE — PROGRESS NOTES
by mouth every 12 hours 180 capsule 3    furosemide (LASIX) 20 MG tablet TAKE 1 TABLET TWICE A DAY (Patient taking differently: Take 20 mg by mouth daily ) 180 tablet 3    NONFORMULARY CBD Gummies      sildenafil (VIAGRA) 100 MG tablet as needed       XARELTO 20 MG TABS tablet TAKE 1 TABLET DAILY 90 tablet 3    magnesium oxide (MAG-OX) 400 (240 Mg) MG tablet Take 1 tablet by mouth daily 30 tablet 5    metFORMIN (GLUCOPHAGE) 1000 MG tablet Take 1 tablet by mouth 2 times daily (with meals) 60 tablet 3    linagliptin (TRADJENTA) 5 MG tablet Take 5 mg by mouth daily      Glucosamine 500 MG CAPS Take 2,000 mg by mouth daily       JARDIANCE 25 MG tablet Take 25 mg by mouth daily       glipiZIDE (GLUCOTROL) 10 MG tablet Take 10 mg by mouth 2 times daily (before meals)       LANTUS SOLOSTAR 100 UNIT/ML injection pen Inject 25 Units into the skin nightly       CIALIS 5 MG tablet Take 5 mg by mouth daily       atorvastatin (LIPITOR) 40 MG tablet Take 40 mg by mouth daily       fenofibric acid (FIBRICOR) 135 MG CPDR capsule Take 135 mg by mouth daily       Multiple Vitamin (MULTI VITAMIN DAILY PO) Take by mouth daily      vitamin D (CHOLECALCIFEROL) 1000 UNIT TABS tablet Take 2,000 Units by mouth daily       CPAP Machine MISC Inhale into the lungs nightly       tamsulosin (FLOMAX) 0.4 MG capsule Take 0.4 mg by mouth daily. No current facility-administered medications for this visit. No Known Allergies    ROS:   Constitutional: Negative for fever, activity change and appetite change. HENT: Negative for epistaxis, difficulty swallowing. Eyes: Negative for blurred vision or double vision. Respiratory: Negative for cough, chest tightness, shortness of breath and wheezing. Cardiovascular: Negative for chest pain, and leg swelling. Gastrointestinal: Negative for abdominal pain, heartburn, or blood in stool. Genitourinary: Negative for hematuria, burning or frequency.    Musculoskeletal: Negative for myalgias, stiffness, or swelling. Skin: Negative for rash, pain, or lumps. Neurological: Negative for syncope, seizures, or headaches. Psychiatric/Behavioral: Negative for confusion and agitation. The patient is not nervous/anxious. PHYSICAL EXAM:  Vitals:    05/06/20 1425 05/06/20 1426   BP: (!) 121/59 129/69   Pulse: 73 68   Weight: (!) 308 lb (139.7 kg)    Height: 6' 4\" (1.93 m)    Constitutional: Oriented to person, place, and time. Pertinent Labs:  CBC:   No results for input(s): WBC, HGB, HCT, PLT in the last 72 hours. BMP:  No results for input(s): NA, K, CL, CO2, BUN, CREATININE, CALCIUM in the last 72 hours. Invalid input(s): GLU  TSH:   Lab Results   Component Value Date    TSH 2.440 01/15/2020        Pertinent Cardiac Testing:     ECHO Complete 2D W Doppler W Color 3/22/18  Findings      Left Ventricle   Mildly dilated left ventricle. Mild left ventricular concentric hypertrophy noted. No regional wall motion abnormalities seen. Low normal left ventricular systolic function. Ejection fraction is visually estimated at 50-55%. Normal left ventricular diastolic filling pattern. Right Ventricle   Normal right ventricular size and function. Left Atrium   The left atrium is moderate-severely dilated. Interatrial septum appears intact. Right Atrium   Normal right atrium size. Mitral Valve   Normal mitral valve structure and function. Tricuspid Valve   Normal tricuspid valve structure and function. Aortic Valve   Structurally normal aortic valve. Pulmonic Valve   The pulmonic valve was not well visualized. Mild pulmonic regurgitation present. Aorta   Aortic root dimension within normal limits. Conclusions      Summary   Mildly dilated left ventricle. Mild left ventricular concentric hypertrophy noted. No regional wall motion abnormalities seen. Low normal left ventricular systolic function.    The left atrium is 1/31/17.  - LV EF 40% on KEVIN 11/6/17.  - LV EF 50-55% on TTE 3/22/18.  - NYHA class II, stage C  - Continue GDMT including Toprol XL, Zestril and Lasix.     5. CARLO  - Continual daily compliance     6. Obesity  - Recommend weight loss  - Body mass index is 37.49 kg/m².      7. Diabetes mellitus  -On Metformin, Tradjenta and Glucotrol     8. Hypertension  - On Zestril, Toprol XL and Lasix. Recommendations:    1. Continue Tikosyn 125 mcg every 12 hours. 2. Continue Toprol XL 50 mg bid. 3. Continue Xarelto 20 mg once daily. 4. Life style modifications. 5. EKG, BMP and Magnesium every 6 months while on Tikosyn. 6. Remotes every 91 days  7. Follow in 6 months. Encouraged the patient to call if he has any questions or concerns. Thank you for allowing me to participate in their care.      I have spent a total of 25 minutes with the Nima Bautista a telephone visit reviewing the above stated recommendations.  A total of >50% of that time involved face-to-face time providing counseling and or coordination of care with the other providers     Lyndsay Bennett MD  Cardiac Electrophysiology  [facility]) Physicians  [facility] [place]: [facility]  2:33 PM  5/6/2020     Due to this being a TeleHealth encounter, evaluation of organ systems is limited.     Pursuant to the emergency declaration under the [facility], [facility] waiver authority and the [employer] Act, this telephone visit was conducted, with patient's consent, to reduce the patient's risk of exposure to COVID-19 and provide continuity of care for an established patient.     Services were provided through a telephone discussion to substitute for in-person clinic visit.

## 2020-05-06 ENCOUNTER — VIRTUAL VISIT (OUTPATIENT)
Dept: NON INVASIVE DIAGNOSTICS | Age: 70
End: 2020-05-06
Payer: MEDICARE

## 2020-05-06 VITALS
SYSTOLIC BLOOD PRESSURE: 129 MMHG | DIASTOLIC BLOOD PRESSURE: 69 MMHG | HEART RATE: 68 BPM | HEIGHT: 76 IN | WEIGHT: 308 LBS | BODY MASS INDEX: 37.51 KG/M2

## 2020-05-06 PROCEDURE — 99443 PR PHYS/QHP TELEPHONE EVALUATION 21-30 MIN: CPT | Performed by: INTERNAL MEDICINE

## 2020-05-06 RX ORDER — LISINOPRIL 5 MG/1
TABLET ORAL DAILY
COMMUNITY
Start: 2020-02-09 | End: 2022-09-27

## 2020-06-17 ENCOUNTER — NURSE ONLY (OUTPATIENT)
Dept: NON INVASIVE DIAGNOSTICS | Age: 70
End: 2020-06-17
Payer: MEDICARE

## 2020-06-17 PROCEDURE — 93296 REM INTERROG EVL PM/IDS: CPT | Performed by: INTERNAL MEDICINE

## 2020-06-17 PROCEDURE — 93294 REM INTERROG EVL PM/LDLS PM: CPT | Performed by: INTERNAL MEDICINE

## 2020-06-17 NOTE — PROGRESS NOTES
See [facility] [place] report. Remote monitoring reviewed over a 90 day period.   End of 90 day monitoring period date of service 06/17/2020

## 2020-06-24 ENCOUNTER — HOSPITAL ENCOUNTER (OUTPATIENT)
Age: 70
Discharge: HOME | End: 2020-06-24
Payer: MEDICARE

## 2020-06-24 LAB
ALBUMIN SERPL-MCNC: 4.1 G/DL (ref 3.5–5.2)
ALP BLD-CCNC: 47 U/L (ref 40–129)
ALT SERPL-CCNC: 21 U/L (ref 0–40)
ANION GAP SERPL CALCULATED.3IONS-SCNC: 12 MMOL/L (ref 7–16)
AST SERPL-CCNC: 23 U/L (ref 0–39)
BILIRUB SERPL-MCNC: 0.4 MG/DL (ref 0–1.2)
BUN BLDV-MCNC: 19 MG/DL (ref 8–23)
CALCIUM SERPL-MCNC: 8.6 MG/DL (ref 8.6–10.2)
CHLORIDE BLD-SCNC: 103 MMOL/L (ref 98–107)
CHOLESTEROL, TOTAL: 138 MG/DL (ref 0–199)
CO2: 25 MMOL/L (ref 22–29)
CREAT SERPL-MCNC: 1.4 MG/DL (ref 0.7–1.2)
GFR AFRICAN AMERICAN: >60
GFR NON-AFRICAN AMERICAN: 50 ML/MIN/1.73
GLUCOSE BLD-MCNC: 102 MG/DL (ref 74–99)
HDLC SERPL-MCNC: 38 MG/DL
LDL CHOLESTEROL CALCULATED: 62 MG/DL (ref 0–99)
MAGNESIUM: 2.2 MG/DL (ref 1.6–2.6)
POTASSIUM SERPL-SCNC: 4.5 MMOL/L (ref 3.5–5)
SODIUM BLD-SCNC: 140 MMOL/L (ref 132–146)
TOTAL PROTEIN: 6.7 G/DL (ref 6.4–8.3)
TRIGL SERPL-MCNC: 189 MG/DL (ref 0–149)
VLDLC SERPL CALC-MCNC: 38 MG/DL

## 2020-06-24 PROCEDURE — 80053 COMPREHEN METABOLIC PANEL: CPT

## 2020-06-24 PROCEDURE — 80061 LIPID PANEL: CPT

## 2020-06-24 PROCEDURE — 83735 ASSAY OF MAGNESIUM: CPT

## 2020-06-24 PROCEDURE — 36415 COLL VENOUS BLD VENIPUNCTURE: CPT

## 2020-08-18 ENCOUNTER — HOSPITAL ENCOUNTER (OUTPATIENT)
Age: 70
Discharge: HOME | End: 2020-08-18
Payer: MEDICARE

## 2020-08-18 PROCEDURE — 36415 COLL VENOUS BLD VENIPUNCTURE: CPT

## 2020-08-18 PROCEDURE — G0103 PSA SCREENING: HCPCS

## 2020-08-18 PROCEDURE — 84153 ASSAY OF PSA TOTAL: CPT

## 2020-08-27 LAB
PROSTATE SPECIFIC ANTIGEN: 2.81 NG/ML (ref 0–4)
PROSTATE SPECIFIC ANTIGEN: ABNORMAL NG/ML (ref 0–4)

## 2020-09-17 ENCOUNTER — NURSE ONLY (OUTPATIENT)
Dept: NON INVASIVE DIAGNOSTICS | Age: 70
End: 2020-09-17
Payer: MEDICARE

## 2020-09-17 PROCEDURE — 93294 REM INTERROG EVL PM/LDLS PM: CPT | Performed by: INTERNAL MEDICINE

## 2020-09-17 PROCEDURE — 93296 REM INTERROG EVL PM/IDS: CPT | Performed by: INTERNAL MEDICINE

## 2020-09-23 NOTE — PROGRESS NOTES
See [facility] [place] report. Remote monitoring reviewed over a 90 day period. End of 90 day monitoring period date of service 9-.

## 2020-10-29 RX ORDER — DOFETILIDE 0.12 MG/1
CAPSULE ORAL
Qty: 180 CAPSULE | Refills: 1 | OUTPATIENT
Start: 2020-10-29

## 2020-11-04 ENCOUNTER — TELEPHONE (OUTPATIENT)
Dept: NON INVASIVE DIAGNOSTICS | Age: 70
End: 2020-11-04

## 2020-11-05 ENCOUNTER — NURSE ONLY (OUTPATIENT)
Dept: NON INVASIVE DIAGNOSTICS | Age: 70
End: 2020-11-05
Payer: MEDICARE

## 2020-11-05 VITALS — SYSTOLIC BLOOD PRESSURE: 140 MMHG | TEMPERATURE: 97.1 F | DIASTOLIC BLOOD PRESSURE: 86 MMHG

## 2020-11-05 PROCEDURE — 93000 ELECTROCARDIOGRAM COMPLETE: CPT | Performed by: INTERNAL MEDICINE

## 2020-11-05 NOTE — PROGRESS NOTES
Patient was seen in the Office today to have EKG per Dr. Sandra Jensen. Patient tolerated. No complaints.     Electronically signed by Yen Barnett MA on 11/5/2020 at 9:55 AM

## 2020-11-09 RX ORDER — FUROSEMIDE 20 MG/1
TABLET ORAL
Qty: 180 TABLET | Refills: 3 | Status: SHIPPED
Start: 2020-11-09 | End: 2021-11-08

## 2020-12-17 RX ORDER — METOPROLOL SUCCINATE 50 MG/1
TABLET, EXTENDED RELEASE ORAL
Qty: 180 TABLET | Refills: 3 | Status: SHIPPED
Start: 2020-12-17 | End: 2021-11-08

## 2020-12-18 ENCOUNTER — NURSE ONLY (OUTPATIENT)
Dept: NON INVASIVE DIAGNOSTICS | Age: 70
End: 2020-12-18
Payer: MEDICARE

## 2020-12-18 DIAGNOSIS — I48.19 PERSISTENT ATRIAL FIBRILLATION (HCC): ICD-10-CM

## 2020-12-18 DIAGNOSIS — I49.5 TACHY-BRADY SYNDROME (HCC): ICD-10-CM

## 2020-12-18 DIAGNOSIS — Z95.0 PACEMAKER: Primary | ICD-10-CM

## 2020-12-18 PROCEDURE — 93294 REM INTERROG EVL PM/LDLS PM: CPT | Performed by: INTERNAL MEDICINE

## 2020-12-18 PROCEDURE — 93296 REM INTERROG EVL PM/IDS: CPT | Performed by: INTERNAL MEDICINE

## 2021-01-04 DIAGNOSIS — I48.0 PAROXYSMAL ATRIAL FIBRILLATION (HCC): Primary | ICD-10-CM

## 2021-01-04 RX ORDER — DOFETILIDE 0.12 MG/1
125 CAPSULE ORAL EVERY 12 HOURS SCHEDULED
Qty: 60 CAPSULE | Refills: 0 | Status: SHIPPED
Start: 2021-01-04 | End: 2021-01-15 | Stop reason: SDUPTHER

## 2021-01-11 ENCOUNTER — HOSPITAL ENCOUNTER (OUTPATIENT)
Age: 71
Discharge: HOME | End: 2021-01-11
Payer: MEDICARE

## 2021-01-11 ENCOUNTER — TELEPHONE (OUTPATIENT)
Dept: NON INVASIVE DIAGNOSTICS | Age: 71
End: 2021-01-11

## 2021-01-11 DIAGNOSIS — I48.0 PAROXYSMAL ATRIAL FIBRILLATION (HCC): ICD-10-CM

## 2021-01-11 LAB
ALBUMIN SERPL-MCNC: 4.3 G/DL (ref 3.5–5.2)
ALP BLD-CCNC: 52 U/L (ref 40–129)
ALT SERPL-CCNC: 20 U/L (ref 0–40)
ANION GAP SERPL CALCULATED.3IONS-SCNC: 10 MMOL/L (ref 7–16)
AST SERPL-CCNC: 22 U/L (ref 0–39)
BASOPHILS ABSOLUTE: 0.04 E9/L (ref 0–0.2)
BASOPHILS RELATIVE PERCENT: 0.6 % (ref 0–2)
BILIRUB SERPL-MCNC: 0.5 MG/DL (ref 0–1.2)
BUN BLDV-MCNC: 17 MG/DL (ref 8–23)
CALCIUM SERPL-MCNC: 9.6 MG/DL (ref 8.6–10.2)
CHLORIDE BLD-SCNC: 105 MMOL/L (ref 98–107)
CO2: 28 MMOL/L (ref 22–29)
CREAT SERPL-MCNC: 1.4 MG/DL (ref 0.7–1.2)
CREATININE URINE: 131 MG/DL (ref 40–278)
EOSINOPHILS ABSOLUTE: 0.24 E9/L (ref 0.05–0.5)
EOSINOPHILS RELATIVE PERCENT: 3.6 % (ref 0–6)
GFR AFRICAN AMERICAN: >60
GFR NON-AFRICAN AMERICAN: 50 ML/MIN/1.73
GLUCOSE BLD-MCNC: 141 MG/DL (ref 74–99)
HCT VFR BLD CALC: 44.5 % (ref 37–54)
HEMOGLOBIN: 14.5 G/DL (ref 12.5–16.5)
IMMATURE GRANULOCYTES #: 0.02 E9/L
IMMATURE GRANULOCYTES %: 0.3 % (ref 0–5)
LYMPHOCYTES ABSOLUTE: 1.24 E9/L (ref 1.5–4)
LYMPHOCYTES RELATIVE PERCENT: 18.7 % (ref 20–42)
MAGNESIUM: 2 MG/DL (ref 1.6–2.6)
MCH RBC QN AUTO: 31.4 PG (ref 26–35)
MCHC RBC AUTO-ENTMCNC: 32.6 % (ref 32–34.5)
MCV RBC AUTO: 96.3 FL (ref 80–99.9)
MONOCYTES ABSOLUTE: 0.49 E9/L (ref 0.1–0.95)
MONOCYTES RELATIVE PERCENT: 7.4 % (ref 2–12)
NEUTROPHILS ABSOLUTE: 4.61 E9/L (ref 1.8–7.3)
NEUTROPHILS RELATIVE PERCENT: 69.4 % (ref 43–80)
PDW BLD-RTO: 13.6 FL (ref 11.5–15)
PHOSPHORUS: 3.6 MG/DL (ref 2.5–4.5)
PLATELET # BLD: 207 E9/L (ref 130–450)
PMV BLD AUTO: 10.8 FL (ref 7–12)
POTASSIUM SERPL-SCNC: 5.3 MMOL/L (ref 3.5–5)
PROTEIN PROTEIN: 7 MG/DL (ref 0–12)
PROTEIN/CREAT RATIO: 0.1
PROTEIN/CREAT RATIO: 0.1 (ref 0–0.2)
RBC # BLD: 4.62 E12/L (ref 3.8–5.8)
SODIUM BLD-SCNC: 143 MMOL/L (ref 132–146)
TOTAL PROTEIN: 7.2 G/DL (ref 6.4–8.3)
URIC ACID, SERUM: 5.6 MG/DL (ref 3.4–7)
WBC # BLD: 6.6 E9/L (ref 4.5–11.5)

## 2021-01-11 PROCEDURE — 83735 ASSAY OF MAGNESIUM: CPT

## 2021-01-11 PROCEDURE — 36415 COLL VENOUS BLD VENIPUNCTURE: CPT

## 2021-01-11 PROCEDURE — 84156 ASSAY OF PROTEIN URINE: CPT

## 2021-01-11 PROCEDURE — 84100 ASSAY OF PHOSPHORUS: CPT

## 2021-01-11 PROCEDURE — 85025 COMPLETE CBC W/AUTO DIFF WBC: CPT

## 2021-01-11 PROCEDURE — 80053 COMPREHEN METABOLIC PANEL: CPT

## 2021-01-11 PROCEDURE — 84550 ASSAY OF BLOOD/URIC ACID: CPT

## 2021-01-11 PROCEDURE — 82570 ASSAY OF URINE CREATININE: CPT

## 2021-01-11 NOTE — TELEPHONE ENCOUNTER
----- Message from Grecia Ahmadi MD sent at 1/11/2021 12:06 PM EST -----  Please let him know that his potassium is slightly elevated at 5.3. Needs to follow up with PCP and decrease potassium intake.  Thanks.  ----- Message -----  From: Lilli Rodriguez Incoming Results From [facility]  Sent: 1/11/2021   9:12 AM EST  To: Grecia Ahmadi MD

## 2021-01-12 NOTE — TELEPHONE ENCOUNTER
Spoke with patient let him know potassium was slightly high to follow up with PCP and decrease potassium intake. Patient verbalized understanding.

## 2021-01-15 RX ORDER — DOFETILIDE 0.12 MG/1
125 CAPSULE ORAL EVERY 12 HOURS SCHEDULED
Qty: 180 CAPSULE | Refills: 1 | Status: SHIPPED
Start: 2021-01-15 | End: 2021-08-24

## 2021-01-15 NOTE — TELEPHONE ENCOUNTER
Requesting Tikosyn refill - CrCl calculated off the following information:    Tikosyn dosage: 125 mcg     Age: 70   Ht: 1.93 m  Wt: 139.7 kg  Cr: 1.4 mg/dl (based off labs on 01/2021)  CrCl: 59.39 mL/min    Last QTc: 430 msec (based on last EKG on 11/2020)

## 2021-01-20 NOTE — PROGRESS NOTES
See [facility] [place] report. Remote monitoring reviewed over a 90 day period. End of 90 day monitoring period date of service 12.18.2020.

## 2021-01-25 ENCOUNTER — TELEPHONE (OUTPATIENT)
Dept: NON INVASIVE DIAGNOSTICS | Age: 71
End: 2021-01-25

## 2021-01-26 ENCOUNTER — HOSPITAL ENCOUNTER (OUTPATIENT)
Age: 71
Discharge: HOME | End: 2021-01-26
Payer: MEDICARE

## 2021-01-26 LAB
CHOLESTEROL, FASTING: 130 MG/DL (ref 0–199)
HDLC SERPL-MCNC: 41 MG/DL
LDL CHOLESTEROL CALCULATED: 61 MG/DL (ref 0–99)
TRIGLYCERIDE, FASTING: 138 MG/DL (ref 0–149)
TSH SERPL DL<=0.05 MIU/L-ACNC: 2.62 UIU/ML (ref 0.27–4.2)
VLDLC SERPL CALC-MCNC: 28 MG/DL

## 2021-01-26 PROCEDURE — 84443 ASSAY THYROID STIM HORMONE: CPT

## 2021-01-26 PROCEDURE — 36415 COLL VENOUS BLD VENIPUNCTURE: CPT

## 2021-01-26 PROCEDURE — 80061 LIPID PANEL: CPT

## 2021-01-26 NOTE — TELEPHONE ENCOUNTER
Patient in [facility] will call back when he is back in town in April.

## 2021-04-02 ENCOUNTER — NURSE ONLY (OUTPATIENT)
Dept: NON INVASIVE DIAGNOSTICS | Age: 71
End: 2021-04-02
Payer: MEDICARE

## 2021-04-02 DIAGNOSIS — I49.5 TACHY-BRADY SYNDROME (HCC): ICD-10-CM

## 2021-04-02 DIAGNOSIS — Z95.0 PACEMAKER: Primary | ICD-10-CM

## 2021-04-08 PROCEDURE — 93296 REM INTERROG EVL PM/IDS: CPT | Performed by: INTERNAL MEDICINE

## 2021-04-08 PROCEDURE — 93294 REM INTERROG EVL PM/LDLS PM: CPT | Performed by: INTERNAL MEDICINE

## 2021-04-08 NOTE — PROGRESS NOTES
See [facility] [place] report. Remote monitoring reviewed over a 90 day period. End of 90 day monitoring period date of service 4. 2.2021.

## 2021-04-12 ENCOUNTER — OFFICE VISIT (OUTPATIENT)
Dept: CARDIOLOGY CLINIC | Age: 71
End: 2021-04-12
Payer: MEDICARE

## 2021-04-12 VITALS
WEIGHT: 286.4 LBS | HEART RATE: 71 BPM | DIASTOLIC BLOOD PRESSURE: 70 MMHG | BODY MASS INDEX: 34.88 KG/M2 | HEIGHT: 76 IN | SYSTOLIC BLOOD PRESSURE: 128 MMHG

## 2021-04-12 DIAGNOSIS — G47.33 OBSTRUCTIVE SLEEP APNEA: ICD-10-CM

## 2021-04-12 DIAGNOSIS — I48.0 PAROXYSMAL ATRIAL FIBRILLATION (HCC): Primary | ICD-10-CM

## 2021-04-12 DIAGNOSIS — I49.5 SINUS NODE DYSFUNCTION (HCC): ICD-10-CM

## 2021-04-12 DIAGNOSIS — I50.42 CHRONIC COMBINED SYSTOLIC AND DIASTOLIC CONGESTIVE HEART FAILURE (HCC): ICD-10-CM

## 2021-04-12 DIAGNOSIS — N18.32 TYPE 2 DIABETES MELLITUS WITH STAGE 3B CHRONIC KIDNEY DISEASE, WITH LONG-TERM CURRENT USE OF INSULIN (HCC): ICD-10-CM

## 2021-04-12 DIAGNOSIS — Z79.4 TYPE 2 DIABETES MELLITUS WITH STAGE 3B CHRONIC KIDNEY DISEASE, WITH LONG-TERM CURRENT USE OF INSULIN (HCC): ICD-10-CM

## 2021-04-12 DIAGNOSIS — E66.8 MODERATE OBESITY: ICD-10-CM

## 2021-04-12 DIAGNOSIS — E11.22 TYPE 2 DIABETES MELLITUS WITH STAGE 3B CHRONIC KIDNEY DISEASE, WITH LONG-TERM CURRENT USE OF INSULIN (HCC): ICD-10-CM

## 2021-04-12 DIAGNOSIS — I42.8 NONISCHEMIC CARDIOMYOPATHY (HCC): ICD-10-CM

## 2021-04-12 DIAGNOSIS — E78.2 MIXED HYPERLIPIDEMIA: ICD-10-CM

## 2021-04-12 PROCEDURE — 93000 ELECTROCARDIOGRAM COMPLETE: CPT | Performed by: INTERNAL MEDICINE

## 2021-04-12 PROCEDURE — 99214 OFFICE O/P EST MOD 30 MIN: CPT | Performed by: INTERNAL MEDICINE

## 2021-04-12 NOTE — PROGRESS NOTES
Attends meetings of clubs or organizations: Not on file     Relationship status: Not on file    Intimate partner violence     Fear of current or ex partner: Not on file     Emotionally abused: Not on file     Physically abused: Not on file     Forced sexual activity: Not on file   Other Topics Concern    Not on file   Social History Narrative    Drinks 1 pot of coffee daily.         Allergies:  No Known Allergies    Current Medications:  Current Outpatient Medications   Medication Sig Dispense Refill    dofetilide (TIKOSYN) 125 MCG capsule Take 1 capsule by mouth every 12 hours 180 capsule 1    metoprolol succinate (TOPROL XL) 50 MG extended release tablet TAKE 1 TABLET TWICE A  tablet 3    furosemide (LASIX) 20 MG tablet TAKE 1 TABLET TWICE A  tablet 3    rivaroxaban (XARELTO) 20 MG TABS tablet TAKE 1 TABLET DAILY 90 tablet 3    lisinopril (PRINIVIL;ZESTRIL) 5 MG tablet       NONFORMULARY CBD Gummies      sildenafil (VIAGRA) 100 MG tablet as needed       magnesium oxide (MAG-OX) 400 (240 Mg) MG tablet Take 1 tablet by mouth daily 30 tablet 5    metFORMIN (GLUCOPHAGE) 1000 MG tablet Take 1 tablet by mouth 2 times daily (with meals) 60 tablet 3    linagliptin (TRADJENTA) 5 MG tablet Take 5 mg by mouth daily      Glucosamine 500 MG CAPS Take 2,000 mg by mouth daily       JARDIANCE 25 MG tablet Take 25 mg by mouth daily       glipiZIDE (GLUCOTROL) 10 MG tablet Take 10 mg by mouth 2 times daily (before meals)       LANTUS SOLOSTAR 100 UNIT/ML injection pen Inject 25 Units into the skin nightly       atorvastatin (LIPITOR) 40 MG tablet Take 40 mg by mouth daily       fenofibric acid (FIBRICOR) 135 MG CPDR capsule Take 135 mg by mouth daily       Multiple Vitamin (MULTI VITAMIN DAILY PO) Take by mouth daily      vitamin D (CHOLECALCIFEROL) 1000 UNIT TABS tablet Take 2,000 Units by mouth daily       CPAP Machine MISC Inhale into the lungs nightly       tamsulosin (FLOMAX) 0.4 MG capsule components found for: CHLPL  Lab Results   Component Value Date    TRIG 189 (H) 06/24/2020    TRIG 91 01/15/2020    TRIG 119 09/24/2019     Lab Results   Component Value Date    HDL 41 01/26/2021    HDL 38 06/24/2020    HDL 39 01/15/2020     Lab Results   Component Value Date    LDLCALC 61 01/26/2021    [address] 62 06/24/2020    [address] 50 01/15/2020       Cardiac Tests:  ECG: A resting electrocardiogram demonstrates evidence of sinus rhythm with occasional supraventricular ectopy with low voltage within the limb leads, delayed precordial transition zone and nonspecific ST changes with a corrected QT interval of approximately 410 ms      ASSESSMENT / PLAN: On a clinical basis, the patient remains compensated in the face of his nonischemic cardiomyopathy and paroxysmal atrial fibrillation with symptomatic improvement with ongoing lifestyle modification. Presently, I have not altered his existing medical regimen and have encouraged ongoing appropriate lifestyle modification to further assist weight reduction to benefit diastolic cardiac performance as well as assist in management of his obstructive sleep apnea in addition to that of the appropriate compliance with the use of his nocturnal CPAP to reduce risk of complications including that of the risk of recurrent atrial arrhythmias. His present medical regimen otherwise appears appropriate with ongoing needs of careful monitoring of his renal function to optimize dosing both of his oral anticoagulation and antiarrhythmic therapy. Continue aggressive risk factor modification of blood pressure, diabetes and serum lipids will remain essential to reducing risk of future atherosclerotic development. Ongoing monitoring of his plantable cardioverter defibrillator will be maintained in conjunction with follow-up of the electrophysiology service.   I presently plan to continue annual cardiovascular reassessment and would happily reassess him in the interim should additional

## 2021-04-17 NOTE — PROGRESS NOTES
tablet 3    furosemide (LASIX) 20 MG tablet TAKE 1 TABLET TWICE A DAY (Patient taking differently: Take 20 mg by mouth daily TAKE 1 TABLET TWICE A DAY) 180 tablet 3    rivaroxaban (XARELTO) 20 MG TABS tablet TAKE 1 TABLET DAILY 90 tablet 3    lisinopril (PRINIVIL;ZESTRIL) 5 MG tablet       NONFORMULARY CBD Gummies      sildenafil (VIAGRA) 100 MG tablet as needed       magnesium oxide (MAG-OX) 400 (240 Mg) MG tablet Take 1 tablet by mouth daily 30 tablet 5    metFORMIN (GLUCOPHAGE) 1000 MG tablet Take 1 tablet by mouth 2 times daily (with meals) 60 tablet 3    linagliptin (TRADJENTA) 5 MG tablet Take 5 mg by mouth daily      Glucosamine 500 MG CAPS Take 2,000 mg by mouth daily       JARDIANCE 25 MG tablet Take 25 mg by mouth daily       glipiZIDE (GLUCOTROL) 10 MG tablet Take 10 mg by mouth 2 times daily (before meals)       LANTUS SOLOSTAR 100 UNIT/ML injection pen Inject 25 Units into the skin nightly       atorvastatin (LIPITOR) 40 MG tablet Take 40 mg by mouth daily       fenofibric acid (FIBRICOR) 135 MG CPDR capsule Take 135 mg by mouth daily       Multiple Vitamin (MULTI VITAMIN DAILY PO) Take by mouth daily      vitamin D (CHOLECALCIFEROL) 1000 UNIT TABS tablet Take 2,000 Units by mouth daily       CPAP Machine MISC Inhale into the lungs nightly       tamsulosin (FLOMAX) 0.4 MG capsule Take 0.4 mg by mouth daily. No current facility-administered medications for this visit. No Known Allergies    ROS:   Constitutional: Negative for fever, activity change and appetite change. HENT: Negative for epistaxis, difficulty swallowing. Eyes: Negative for blurred vision or double vision. Respiratory: Negative for cough, chest tightness, shortness of breath and wheezing. Cardiovascular: Negative for chest pain, and leg swelling. Gastrointestinal: Negative for abdominal pain, heartburn, or blood in stool. Genitourinary: Negative for hematuria, burning or frequency. Musculoskeletal: Negative for myalgias, stiffness, or swelling. Skin: Negative for rash, pain, or lumps. Neurological: Negative for syncope, seizures, or headaches. Psychiatric/Behavioral: Negative for confusion and agitation. The patient is not nervous/anxious. PHYSICAL EXAM:  Vitals:    04/19/21 0806   BP: 132/76   Site: Left Upper Arm   Position: Sitting   Cuff Size: Medium Adult   Pulse: 77   Resp: 18   SpO2: 97%   Weight: 289 lb (131.1 kg)   Height: 6' 4\" (1.93 m)      Constitutional: Well-developed, no acute distress  Eyes: conjunctivae normal, no xanthelasma   Ears, Nose, Throat: oral mucosa moist, no cyanosis   CV: no JVD. Regular rate and rhythm. Normal S1S2 and no S3. No murmurs, rubs, or gallops. PMI is nondisplaced  Lungs: clear to auscultation bilaterally, normal respiratory effort without used of accessory muscles  Abdomen: soft, non-tender, bowel sounds present, no masses or hepatomegaly   Musculoskeletal: no digital clubbing, no edema   Skin: warm, no rashes   Pacemaker site: well healed. No erosion, infection or migration    Pertinent Labs:  CBC:   No results for input(s): WBC, HGB, HCT, PLT in the last 72 hours. BMP:  No results for input(s): NA, K, CL, CO2, BUN, CREATININE, CALCIUM in the last 72 hours. Invalid input(s): GLU  TSH:   Lab Results   Component Value Date    TSH 2.620 01/26/2021        Pertinent Cardiac Testing:     ECHO Complete 2D W Doppler W Color 3/22/18  Findings      Left Ventricle   Mildly dilated left ventricle. Mild left ventricular concentric hypertrophy noted. No regional wall motion abnormalities seen. Low normal left ventricular systolic function. Ejection fraction is visually estimated at 50-55%. Normal left ventricular diastolic filling pattern. Right Ventricle   Normal right ventricular size and function. Left Atrium   The left atrium is moderate-severely dilated. Interatrial septum appears intact.       Right Atrium   Normal right atrium size. Mitral Valve   Normal mitral valve structure and function. Tricuspid Valve   Normal tricuspid valve structure and function. Aortic Valve   Structurally normal aortic valve. Pulmonic Valve   The pulmonic valve was not well visualized. Mild pulmonic regurgitation present. Aorta   Aortic root dimension within normal limits. Conclusions      Summary   Mildly dilated left ventricle. Mild left ventricular concentric hypertrophy noted. No regional wall motion abnormalities seen. Low normal left ventricular systolic function. The left atrium is moderate-severely dilated. Signature      ----------------------------------------------------------------   Electronically signed by Srini Olvera MD(Interpreting   physician) on 03/22/2018 05:21 PM   ----------------------------------------------------------------     M-Mode/2D Measurements & Calculations      LV Diastolic     LV Systolic Dimension: 4.2 cm   AV Cusp Separation: 2.1   Dimension: 5.8   LV Volume Diastolic: 969.2 ml   cmAO Root Dimension: 3.4   cm               LV Volume Systolic: 26.6 ml     cm   LV FS:27.6 %     LV EDV/LV EDV Index: 485.2   LV PW Diastolic: LT/25 PD/V^8KI ESV/LV ESV   1.3 cm           Index: 77.2 ml/29ml/ m^2   Septum           EF Calculated: 53.2 %           RV Diastolic Dimension:   Diastolic: 1.3   LV Mass Index: 125 l/min*m^2    3. 2 cm   cm                                                    LA/Aorta: 1.68   LV Mass: 331.4 g LVOT: 2.3 cm                                                    LA volume/Index: 133 ml                                                    RA Area: 18.8 cm^2     Doppler Measurements & Calculations      MV Peak E-Wave: 0.52 AV Peak Velocity: 1.34 LVOT Peak Velocity: 1.03 m/s   m/s                  m/s                    LVOT Mean Velocity: 0.61 m/s   MV Peak A-Wave: 0.8  AV Peak Gradient: 7.18 LVOT Peak Gradient: 4.3   m/s                  mmHg mmHgLVOT Mean Gradient: 1.8   MV E/A Ratio: 0.65   AV Mean Velocity: 0.82 mmHg   MV Peak Gradient:    m/s   1.8 mmHg             AV Mean Gradient: 3.4   MV Mean Gradient:    mmHg   0.6 mmHg             AV VTI: 24.3 cm   MV Mean Velocity:    AV Area   0.37 m/s             (Continuity):3.54 cm^2 PV Peak Velocity: 0.87 m/s   MV Deceleration                             PV Peak Gradient: 3.02 mmHg   Time: 295.4 msec     LVOT VTI: 20.7 cm      PV Mean Velocity: 0.53 m/s   MV P1/2t: 59.1 msec                         PV Mean Gradient: 1.4 mmHg   MVA by PHT:3.72 cm^2   MV Area   (continuity): 4.1   cm^2     ECG 4/19/21: SR, low voltage rate, 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, LAFB,, 77 bpm, QTc: 419 ms - see scanned cardiology     Remote Device Interrogation 4/19/21:   Make/Model [employer] [place]  Mode MVPR 60/120  Battery Voltage/Longevity: 11.4 years    Pacing: A: 84.6%  RV: <0.1%   P wave: 2.5 mV Impedance: 513 ohms   Threshold: 0.75 V @ 0.4 ms  RV R wave: 8.1 mV  Impedance: 760 ohms   Threshold: 0.5 V @ 0.4 ms  Episodes: AF burden: <0.1%  - SVT episodes, lasting 1-10 seconds, 1 NSVT  Reprogramming included: see below  Overall device function is normal    All device programmable settings were evaluated per above and in the scanned document, along with iterative adjustments (capture thresholds) to assess and select the most appropriate final programming to provide for consistent delivery of the appropriate therapy and to verify function of the device. Impression:    1. Persistent atrial fibrillation  - Symptomatic.  - Diagnosed in 7/2016.  - KEVIN/DC-CV and Tikosn initiation in 1/2017  - PVI + LA + RA rotor, SVC isolation, CTI flutter and LA roof linear ablation in 11/2017  - Dofetilide stopping in 6/2018 with recurrent persistent AF in 9/2018. - Tikosyn initiation in 12/2018. - Status post pacemaker insertion 12/6/18 due to SND.  - Remain in NSR with stable QTc.  - AF burden < 0.1%.   - CrCl by IBW was 58.53 mL/min the patient to call if he has any questions or concerns. Thank you for allowing me to participate in their care.      I have spent a total of 40 minutes with the patient and the family reviewing the above stated recommendations.   And a total of >50% of that time involved face-to-face time providing counseling and or coordination of care with the other providers, preparation for the clinic visit, reviewing records/tests, counseling/education of the patient, ordering medications/tests/procedures, coordinating care, and documenting clinical information in the EHR.     Rashaun Adam, [address]  [facility] [place]: [facility]  8:29 AM  4/19/2021

## 2021-04-19 ENCOUNTER — OFFICE VISIT (OUTPATIENT)
Dept: NON INVASIVE DIAGNOSTICS | Age: 71
End: 2021-04-19
Payer: MEDICARE

## 2021-04-19 VITALS
RESPIRATION RATE: 18 BRPM | HEIGHT: 76 IN | OXYGEN SATURATION: 97 % | BODY MASS INDEX: 35.19 KG/M2 | SYSTOLIC BLOOD PRESSURE: 132 MMHG | HEART RATE: 77 BPM | DIASTOLIC BLOOD PRESSURE: 76 MMHG | WEIGHT: 289 LBS

## 2021-04-19 DIAGNOSIS — I48.0 PAROXYSMAL ATRIAL FIBRILLATION (HCC): Primary | ICD-10-CM

## 2021-04-19 DIAGNOSIS — Z95.0 PACEMAKER: ICD-10-CM

## 2021-04-19 PROCEDURE — 93280 PM DEVICE PROGR EVAL DUAL: CPT | Performed by: INTERNAL MEDICINE

## 2021-04-19 PROCEDURE — 99215 OFFICE O/P EST HI 40 MIN: CPT | Performed by: INTERNAL MEDICINE

## 2021-06-11 ENCOUNTER — HOSPITAL ENCOUNTER (OUTPATIENT)
Age: 71
Discharge: HOME | End: 2021-06-11
Payer: MEDICARE

## 2021-06-11 DIAGNOSIS — I48.0 PAROXYSMAL ATRIAL FIBRILLATION (HCC): ICD-10-CM

## 2021-06-11 LAB
ALBUMIN SERPL-MCNC: 4 G/DL (ref 3.5–5.2)
ALP BLD-CCNC: 48 U/L (ref 40–129)
ALT SERPL-CCNC: 20 U/L (ref 0–40)
ANION GAP SERPL CALCULATED.3IONS-SCNC: 10 MMOL/L (ref 7–16)
AST SERPL-CCNC: 21 U/L (ref 0–39)
BASOPHILS ABSOLUTE: 0.03 E9/L (ref 0–0.2)
BASOPHILS RELATIVE PERCENT: 0.6 % (ref 0–2)
BILIRUB SERPL-MCNC: 0.4 MG/DL (ref 0–1.2)
BUN BLDV-MCNC: 23 MG/DL (ref 6–23)
CALCIUM SERPL-MCNC: 8.8 MG/DL (ref 8.6–10.2)
CHLORIDE BLD-SCNC: 105 MMOL/L (ref 98–107)
CHOLESTEROL, FASTING: 125 MG/DL (ref 0–199)
CO2: 25 MMOL/L (ref 22–29)
CREAT SERPL-MCNC: 1.2 MG/DL (ref 0.7–1.2)
CREATININE URINE: 114 MG/DL (ref 40–278)
EOSINOPHILS ABSOLUTE: 0.34 E9/L (ref 0.05–0.5)
EOSINOPHILS RELATIVE PERCENT: 6.5 % (ref 0–6)
GFR AFRICAN AMERICAN: >60
GFR NON-AFRICAN AMERICAN: 59 ML/MIN/1.73
GLUCOSE BLD-MCNC: 124 MG/DL (ref 74–99)
HCT VFR BLD CALC: 42.8 % (ref 37–54)
HDLC SERPL-MCNC: 34 MG/DL
HEMOGLOBIN: 14.3 G/DL (ref 12.5–16.5)
IMMATURE GRANULOCYTES #: 0.02 E9/L
IMMATURE GRANULOCYTES %: 0.4 % (ref 0–5)
LDL CHOLESTEROL CALCULATED: 64 MG/DL (ref 0–99)
LYMPHOCYTES ABSOLUTE: 1.37 E9/L (ref 1.5–4)
LYMPHOCYTES RELATIVE PERCENT: 26 % (ref 20–42)
MAGNESIUM: 2.1 MG/DL (ref 1.6–2.6)
MCH RBC QN AUTO: 31.2 PG (ref 26–35)
MCHC RBC AUTO-ENTMCNC: 33.4 % (ref 32–34.5)
MCV RBC AUTO: 93.4 FL (ref 80–99.9)
MICROALBUMIN UR-MCNC: <12 MG/L
MICROALBUMIN/CREAT UR-RTO: ABNORMAL (ref 0–30)
MONOCYTES ABSOLUTE: 0.49 E9/L (ref 0.1–0.95)
MONOCYTES RELATIVE PERCENT: 9.3 % (ref 2–12)
NEUTROPHILS ABSOLUTE: 3.01 E9/L (ref 1.8–7.3)
NEUTROPHILS RELATIVE PERCENT: 57.2 % (ref 43–80)
PDW BLD-RTO: 13.2 FL (ref 11.5–15)
PLATELET # BLD: 186 E9/L (ref 130–450)
PMV BLD AUTO: 9.9 FL (ref 7–12)
POTASSIUM SERPL-SCNC: 4.3 MMOL/L (ref 3.5–5)
RBC # BLD: 4.58 E12/L (ref 3.8–5.8)
SODIUM BLD-SCNC: 140 MMOL/L (ref 132–146)
TOTAL PROTEIN: 6.6 G/DL (ref 6.4–8.3)
TRIGLYCERIDE, FASTING: 137 MG/DL (ref 0–149)
TSH SERPL DL<=0.05 MIU/L-ACNC: 2.72 UIU/ML (ref 0.27–4.2)
VLDLC SERPL CALC-MCNC: 27 MG/DL
WBC # BLD: 5.3 E9/L (ref 4.5–11.5)

## 2021-06-11 PROCEDURE — 85025 COMPLETE CBC W/AUTO DIFF WBC: CPT

## 2021-06-11 PROCEDURE — 36415 COLL VENOUS BLD VENIPUNCTURE: CPT

## 2021-06-11 PROCEDURE — 83036 HEMOGLOBIN GLYCOSYLATED A1C: CPT

## 2021-06-11 PROCEDURE — 80053 COMPREHEN METABOLIC PANEL: CPT

## 2021-06-11 PROCEDURE — 84443 ASSAY THYROID STIM HORMONE: CPT

## 2021-06-11 PROCEDURE — 82044 UR ALBUMIN SEMIQUANTITATIVE: CPT

## 2021-06-11 PROCEDURE — 82570 ASSAY OF URINE CREATININE: CPT

## 2021-06-11 PROCEDURE — 80061 LIPID PANEL: CPT

## 2021-06-11 PROCEDURE — 83735 ASSAY OF MAGNESIUM: CPT

## 2021-06-13 LAB — HBA1C MFR BLD: 7 % (ref 4–5.6)

## 2021-07-06 ENCOUNTER — TELEPHONE (OUTPATIENT)
Dept: ADMINISTRATIVE | Age: 71
End: 2021-07-06

## 2021-07-06 NOTE — TELEPHONE ENCOUNTER
Spoke to the patient and told him that we had received his remote transmission. It turned into a no show from the holiday weekend. He verbalized understanding. Told him that his next remote transmission is 10/1/2021.

## 2021-08-04 ENCOUNTER — TELEPHONE (OUTPATIENT)
Dept: NON INVASIVE DIAGNOSTICS | Age: 71
End: 2021-08-04

## 2021-08-04 NOTE — TELEPHONE ENCOUNTER
Patient called and states he has 10 days left of tikosyn and he is leaving on vacation for 3 weeks. I called him in a 30 day supply at [facility] locally to get hm by.  His labs were reviewed by RN      Requesting Tikosyn refill - CrCl calculated off the following information:    Tikosyn dosage: 125 mcg bid    Age: 66   Ht: 1.93 m  Wt: 131.1 kg  Cr: 1.2 mg/dl (based off labs on 6/11/2021)  CrCl: 68.29 mL/min    Last QTc: 419 msec (based on last EKG on 04/21)

## 2021-08-24 RX ORDER — DOFETILIDE 0.12 MG/1
CAPSULE ORAL
Qty: 180 CAPSULE | Refills: 1 | Status: SHIPPED
Start: 2021-08-24 | End: 2022-01-25

## 2021-08-24 NOTE — TELEPHONE ENCOUNTER
Requesting Tikosyn refill - CrCl calculated off the following information:    Tikosyn dosage: 125 mcg BID    Age: 71   Ht: 1.93 m  Wt: 131.14 kg  Cr: 1.2 mg/dl (based off labs on 6/11/2021)  CrCl: 68.29 mL/min    Last QTc: 419 msec (based on last EKG on 4/2021)  ok to refill

## 2021-09-20 ENCOUNTER — TELEPHONE (OUTPATIENT)
Dept: NON INVASIVE DIAGNOSTICS | Age: 71
End: 2021-09-20

## 2021-09-20 NOTE — TELEPHONE ENCOUNTER
achycardia: AF  1  AT/AF [place] is 1.2% with 2 mode switches since 7/2021 routine report, longest duration (H:M:S)    Most recent and longest episode occurred on 17-Sep-2021 at 12:54 pm and was still in progress 18-Sep-2021 at 12:02 pm. No EGM, please see presenting  Cardiac Compass Trends show mean V rate is ~ 95 bpm during AF  Additional Notes:  ON XARELTO, TIKOSYN, TOPROL XL 50MG BID. Please check remote in 1 week. Thanks.

## 2021-11-08 DIAGNOSIS — I48.19 PERSISTENT ATRIAL FIBRILLATION (HCC): ICD-10-CM

## 2021-11-08 RX ORDER — FUROSEMIDE 20 MG/1
TABLET ORAL
Qty: 180 TABLET | Refills: 3 | Status: SHIPPED | OUTPATIENT
Start: 2021-11-08

## 2021-11-08 RX ORDER — METOPROLOL SUCCINATE 50 MG/1
TABLET, EXTENDED RELEASE ORAL
Qty: 180 TABLET | Refills: 3 | Status: SHIPPED
Start: 2021-11-08 | End: 2022-06-23 | Stop reason: DRUGHIGH

## 2022-01-04 NOTE — PROGRESS NOTES
Cardiac Electrophysiology Outpatient Progress Note    Fifi Hogan  1950  Date of Service: 1/6/2022  Referring Provider/PCP: Danis Min MD  Cardiologist: Joy Barger MD  Electrophysiologist: Keyon Merlos MD    Chief compliants: Persistent atrial fibrillation status post ablation and status post pacemaker implantation    SUBJECTIVE: Fifi Hogan is 66 y.o. gentleman who is seen in cardiac electrophysiology clinic today for persistent atrial fibrillation and pacemaker in situ. Since last office visit, Mr. Erica Low states he feels overall well and offers no complaints from a device POV. The patient denies any chest pain, dyspnea, palpitations, dizziness, syncope, orthopnea or paroxysmal nocturnal dyspnea. He continues on Tikosyn for rhythm control, Toprol XL for rate control and Xarelto for stroke risk reduction. The patient continues to be followed remotely.      Patient Active Problem List    Diagnosis Date Noted    Status post catheter ablation of atrial fibrillation 12/07/2018    Tachy-vaughn syndrome ([address] 12/05/2018    Sinus node dysfunction ([address] 12/05/2018    Paroxysmal atrial fibrillation ([address] 10/08/2018    Typical atrial flutter ([address]     Mixed hyperlipidemia 12/07/2016    Obstructive sleep apnea 09/20/2016    Chronic kidney disease, stage III (moderate) ([address] 09/20/2016    Nonischemic cardiomyopathy ([address] 08/11/2016    Chronic combined systolic and diastolic congestive heart failure ([address] 08/11/2016    LV dysfunction     Moderate obesity     Persistent atrial fibrillation ([address] 07/15/2016    Type 2 diabetes mellitus with stage 3 chronic kidney disease, with long-term current use of insulin ([address] 07/15/2016       Current Outpatient Medications   Medication Sig Dispense Refill    Pregnenolone Micronized (PREGNENOLONE PO) Take 2 tablets by mouth Daily with supper      TESTOSTERONE IM Inject into the muscle      metoprolol succinate (TOPROL XL) 50 MG extended release tablet TAKE 1 TABLET TWICE A  tablet 3    furosemide (LASIX) 20 MG tablet TAKE 1 TABLET TWICE A DAY (Patient taking differently: Take 20 mg by mouth daily TAKE) 180 tablet 3    dofetilide (TIKOSYN) 125 MCG capsule TAKE 1 CAPSULE EVERY 12 HOURS 180 capsule 1    rivaroxaban (XARELTO) 20 MG TABS tablet TAKE 1 TABLET DAILY 90 tablet 3    lisinopril (PRINIVIL;ZESTRIL) 5 MG tablet       NONFORMULARY CBD Gummies      sildenafil (VIAGRA) 100 MG tablet as needed       magnesium oxide (MAG-OX) 400 (240 Mg) MG tablet Take 1 tablet by mouth daily 30 tablet 5    metFORMIN (GLUCOPHAGE) 1000 MG tablet Take 1 tablet by mouth 2 times daily (with meals) 60 tablet 3    linagliptin (TRADJENTA) 5 MG tablet Take 5 mg by mouth daily      Glucosamine 500 MG CAPS Take 2,000 mg by mouth daily       JARDIANCE 25 MG tablet Take 25 mg by mouth daily       glipiZIDE (GLUCOTROL) 10 MG tablet Take 10 mg by mouth 2 times daily (before meals)       LANTUS SOLOSTAR 100 UNIT/ML injection pen Inject 25 Units into the skin nightly       atorvastatin (LIPITOR) 40 MG tablet Take 40 mg by mouth daily       fenofibric acid (FIBRICOR) 135 MG CPDR capsule Take 135 mg by mouth daily       Multiple Vitamin (MULTI VITAMIN DAILY PO) Take by mouth daily      vitamin D (CHOLECALCIFEROL) 1000 UNIT TABS tablet Take 2,000 Units by mouth daily       CPAP Machine MISC Inhale into the lungs nightly       tamsulosin (FLOMAX) 0.4 MG capsule Take 0.4 mg by mouth daily. No current facility-administered medications for this visit. No Known Allergies    ROS:   Constitutional: Negative for fever, activity change and appetite change. HENT: Negative for epistaxis, difficulty swallowing. Eyes: Negative for blurred vision or double vision. Respiratory: Negative for cough, chest tightness, shortness of breath and wheezing. Cardiovascular: Negative for chest pain, and leg swelling.    Gastrointestinal: Negative for abdominal pain, heartburn, or blood in stool. Genitourinary: Negative for hematuria, burning or frequency. Musculoskeletal: Negative for myalgias, stiffness, or swelling. Skin: Negative for rash, pain, or lumps. Neurological: Negative for syncope, seizures, or headaches. Psychiatric/Behavioral: Negative for confusion and agitation. The patient is not nervous/anxious. PHYSICAL EXAM:  Vitals:    01/06/22 0906   BP: 138/86   Pulse: 71   Resp: 16   SpO2: 93%   Weight: 290 lb (131.5 kg)   Height: 6' 4\" (1.93 m)      Constitutional: Well-developed, no acute distress  Eyes: conjunctivae normal, no xanthelasma   Ears, Nose, Throat: oral mucosa moist, no cyanosis   CV: no JVD. Regular rate and rhythm. Normal S1S2 and no S3. No murmurs, rubs, or gallops. PMI is nondisplaced  Lungs: clear to auscultation bilaterally, normal respiratory effort without used of accessory muscles  Abdomen: soft, non-tender, bowel sounds present, no masses or hepatomegaly   Musculoskeletal: no digital clubbing, no edema   Skin: warm, no rashes   Pacemaker site: well healed. No erosion, infection or migration    Pertinent Labs:  CBC:   No results for input(s): WBC, HGB, HCT, PLT in the last 72 hours. BMP:  No results for input(s): NA, K, CL, CO2, BUN, CREATININE, GLU, CALCIUM in the last 72 hours. TSH:   Lab Results   Component Value Date    TSH 2.720 06/11/2021        Pertinent Cardiac Testing:     ECHO Complete 2D W Doppler W Color 3/22/18  Findings      Left Ventricle   Mildly dilated left ventricle. Mild left ventricular concentric hypertrophy noted. No regional wall motion abnormalities seen. Low normal left ventricular systolic function. Ejection fraction is visually estimated at 50-55%. Normal left ventricular diastolic filling pattern. Right Ventricle   Normal right ventricular size and function. Left Atrium   The left atrium is moderate-severely dilated. Interatrial septum appears intact.       Right Atrium   Normal right atrium size. Mitral Valve   Normal mitral valve structure and function. Tricuspid Valve   Normal tricuspid valve structure and function. Aortic Valve   Structurally normal aortic valve. Pulmonic Valve   The pulmonic valve was not well visualized. Mild pulmonic regurgitation present. Aorta   Aortic root dimension within normal limits. Conclusions      Summary   Mildly dilated left ventricle. Mild left ventricular concentric hypertrophy noted. No regional wall motion abnormalities seen. Low normal left ventricular systolic function. The left atrium is moderate-severely dilated. Signature      ----------------------------------------------------------------   Electronically signed by Key Barnett MD(Interpreting   physician) on 03/22/2018 05:21 PM   ----------------------------------------------------------------     M-Mode/2D Measurements & Calculations      LV Diastolic     LV Systolic Dimension: 4.2 cm   AV Cusp Separation: 2.1   Dimension: 5.8   LV Volume Diastolic: 484.6 ml   cmAO Root Dimension: 3.4   cm               LV Volume Systolic: 85.4 ml     cm   LV FS:27.6 %     LV EDV/LV EDV Index: 686.0   LV PW Diastolic: QH/89 LX/G^4YF ESV/LV ESV   1.3 cm           Index: 77.2 ml/29ml/ m^2   Septum           EF Calculated: 53.2 %           RV Diastolic Dimension:   Diastolic: 1.3   LV Mass Index: 125 l/min*m^2    3. 2 cm   cm                                                    LA/Aorta: 1.68   LV Mass: 331.4 g LVOT: 2.3 cm                                                    LA volume/Index: 133 ml                                                    RA Area: 18.8 cm^2     Doppler Measurements & Calculations      MV Peak E-Wave: 0.52 AV Peak Velocity: 1.34 LVOT Peak Velocity: 1.03 m/s   m/s                  m/s                    LVOT Mean Velocity: 0.61 m/s   MV Peak A-Wave: 0.8  AV Peak Gradient: 7.18 LVOT Peak Gradient: 4.3   m/s                  mmHg mmHgLVOT Mean Gradient: 1.8   MV E/A Ratio: 0.65   AV Mean Velocity: 0.82 mmHg   MV Peak Gradient:    m/s   1.8 mmHg             AV Mean Gradient: 3.4   MV Mean Gradient:    mmHg   0.6 mmHg             AV VTI: 24.3 cm   MV Mean Velocity:    AV Area   0.37 m/s             (Continuity):3.54 cm^2 PV Peak Velocity: 0.87 m/s   MV Deceleration                             PV Peak Gradient: 3.02 mmHg   Time: 295.4 msec     LVOT VTI: 20.7 cm      PV Mean Velocity: 0.53 m/s   MV P1/2t: 59.1 msec                         PV Mean Gradient: 1.4 mmHg   MVA by PHT:3.72 cm^2   MV Area   (continuity): 4.1   cm^2     ECG 1/6/22: SR, low voltage rate, old anterior infarct, LAFB,, 71 bpm, QTc: 403 ms - see scanned cardiology     Device Interrogation: 1/6/22   Make/Model [employer] e[employer]  Mode MVPR 60/120  Underlying rhythm: ApVs  Battery Voltage/Longevity: 10.7 years  Pacing: A: 75.8%  RV: 0.5%    P wave: 2.1 mV  Impedance: 437 ohms   Threshold: 0.75 V @ 0.4 ms  RV R wave: 6.8 mV  Impedance: 665 ohms   Threshold: 0.75 V @ 0.4 ms  Episodes: No AF since September 2021; brief SVTs. Reprogramming included: none  Overall device function is normal    All device programmable settings were evaluated per above and in the scanned document, along with iterative adjustments (capture thresholds) to assess and select the most appropriate final programming to provide for consistent delivery of the appropriate therapy and to verify function of the device. Impression:    1. Persistent atrial fibrillation  - Symptomatic.  - Diagnosed in 7/2016.  - [id number] of 4 on Xarelto for stroke risk reduction  - KEVIN/DC-CV and Tikosn initiation in 1/2017  - PVI + LA + RA rotor, SVC isolation, CTI flutter and LA roof linear ablation in 11/2017  - Dofetilide stopping in 6/2018 with recurrent persistent AF in 9/2018. - Tikosyn initiation in 12/2018. - Status post pacemaker insertion 12/6/18 due to SND.  - Remain in NSR with stable QTc.  - AF burden 0%.   - CrCl by IBW was 68.29 mL/min based of Cr of 1.2 on 6/11/2021  - Re-education on importance of well controlled HTN (goal BP < 130/80), adequate weight control (goal BMI of < 27), physical activity consisting of moderate cardiopulmonary exercise up to a goal of 250 min/wk, daily compliance with CPAP in treating sleep apnea, smoking cessation and limited ETOH intake.      2. Tachy-vaughn syndrome  - Sinus node dysfunction with sinus arrest >5 seconds and need for Beta-blocker and Dofetilide therapy for rapid AF  - Status post pacemaker insertion on 12/6/18     3. Pacemaker  in situ  - [employer]; dual chamber  - Indication: SND  - DOI 12/6/18.  - Normal device function. 4. Chronic systolic CHF  - Recovered LV EF.  - LV EF 30-35% on TTE 12/19/16.  - LV EF 40-45% on TTE 1/31/17.  - LV EF 40% on KEVIN 11/6/17.  - LV EF 50-55% on TTE 3/22/18.  - NYHA class II, ACC stage C.  - Euvolemic and compensated. - Continue GDMT including Toprol XL, Zestril and Lasix.     5. CARLO  - Continual daily compliance with CPAP.     6. Obesity  - Recommend weight loss  - Body mass index is 35.3 kg/m².      7. Diabetes mellitus  -On Lantus, Metformin, Tradjenta, Jardiance and Glucotrol     8. Hypertension  - Well controlled. - On Zestril, Toprol XL and Lasix. 9. Hyperlipidemia  - On Lipitor and Fenofibric acid    10. BPH  - On Flomax    11. Vitamin deficiency  - On Vitamins D supplement. 12. Supraventricular tachycardia  - Noted on today interrogation.  - Brief  - Asymptomatic.  - On Toprol XL and Tikosyn    13. Nonsustained ventricular tachycardia  - Noted per previous interrogation.  - Brief  - Asymptomatic.  - On Toprol XL    Recommendations:    1. Normal pacemaker function. 2. Continue Tikosyn 125 mcg every 12 hours. 3. Continue Toprol XL 50 mg bid. 4. Continue Xarelto 20 mg once daily. 5. Life style modifications as above. 6. Obtain EKG, BMP and Magnesium every 3 to 6 months while on Tikosyn.  Scripts given for blood tests which he prefers to have it done at nephrology's office next week. 7. Remote monitoring every 91 days  8. Follow in 6 months. Encouraged the patient to call if he has any questions or concerns. Thank you for allowing me to participate in their care.      I have spent a total of 40 minutes with the patient and the family reviewing the above stated recommendations. And a total of >50% of that time involved face-to-face time providing counseling and or coordination of care with the other providers, preparation for the clinic visit, reviewing records/tests, counseling/education of the patient, ordering medications/tests/procedures, coordinating care, and documenting clinical information in the EHR.      Gregory Nina MD  Cardiac Electrophysiology  [facility]  [facility]: [facility]  1/6/22   9:17 AM

## 2022-01-06 ENCOUNTER — OFFICE VISIT (OUTPATIENT)
Dept: NON INVASIVE DIAGNOSTICS | Age: 72
End: 2022-01-06
Payer: MEDICARE

## 2022-01-06 VITALS
RESPIRATION RATE: 16 BRPM | DIASTOLIC BLOOD PRESSURE: 86 MMHG | BODY MASS INDEX: 35.31 KG/M2 | OXYGEN SATURATION: 93 % | SYSTOLIC BLOOD PRESSURE: 138 MMHG | HEIGHT: 76 IN | HEART RATE: 71 BPM | WEIGHT: 290 LBS

## 2022-01-06 DIAGNOSIS — I42.8 NONISCHEMIC CARDIOMYOPATHY (HCC): ICD-10-CM

## 2022-01-06 DIAGNOSIS — I48.19 PERSISTENT ATRIAL FIBRILLATION (HCC): Primary | ICD-10-CM

## 2022-01-06 PROCEDURE — 93000 ELECTROCARDIOGRAM COMPLETE: CPT | Performed by: INTERNAL MEDICINE

## 2022-01-06 PROCEDURE — 99215 OFFICE O/P EST HI 40 MIN: CPT | Performed by: INTERNAL MEDICINE

## 2022-01-13 ENCOUNTER — HOSPITAL ENCOUNTER (OUTPATIENT)
Age: 72
Discharge: HOME | End: 2022-01-13
Payer: MEDICARE

## 2022-01-13 DIAGNOSIS — I48.19 PERSISTENT ATRIAL FIBRILLATION (HCC): ICD-10-CM

## 2022-01-13 LAB
ALBUMIN SERPL-MCNC: 4.3 G/DL (ref 3.5–5.2)
ALP BLD-CCNC: 56 U/L (ref 40–129)
ALT SERPL-CCNC: 17 U/L (ref 0–40)
ANION GAP SERPL CALCULATED.3IONS-SCNC: 11 MMOL/L (ref 7–16)
AST SERPL-CCNC: 22 U/L (ref 0–39)
BASOPHILS ABSOLUTE: 0.05 E9/L (ref 0–0.2)
BASOPHILS RELATIVE PERCENT: 0.7 % (ref 0–2)
BILIRUB SERPL-MCNC: 0.6 MG/DL (ref 0–1.2)
BUN BLDV-MCNC: 19 MG/DL (ref 6–23)
CALCIUM SERPL-MCNC: 9.3 MG/DL (ref 8.6–10.2)
CHLORIDE BLD-SCNC: 103 MMOL/L (ref 98–107)
CO2: 26 MMOL/L (ref 22–29)
CREAT SERPL-MCNC: 1.3 MG/DL (ref 0.7–1.2)
CREATININE URINE: 158 MG/DL (ref 40–278)
EOSINOPHILS ABSOLUTE: 0.21 E9/L (ref 0.05–0.5)
EOSINOPHILS RELATIVE PERCENT: 3 % (ref 0–6)
GFR AFRICAN AMERICAN: >60
GFR NON-AFRICAN AMERICAN: 54 ML/MIN/1.73
GLUCOSE BLD-MCNC: 102 MG/DL (ref 74–99)
HCT VFR BLD CALC: 42.5 % (ref 37–54)
HEMOGLOBIN: 14 G/DL (ref 12.5–16.5)
IMMATURE GRANULOCYTES #: 0.02 E9/L
IMMATURE GRANULOCYTES %: 0.3 % (ref 0–5)
LYMPHOCYTES ABSOLUTE: 1.51 E9/L (ref 1.5–4)
LYMPHOCYTES RELATIVE PERCENT: 21.7 % (ref 20–42)
MAGNESIUM: 2 MG/DL (ref 1.6–2.6)
MCH RBC QN AUTO: 30.7 PG (ref 26–35)
MCHC RBC AUTO-ENTMCNC: 32.9 % (ref 32–34.5)
MCV RBC AUTO: 93.2 FL (ref 80–99.9)
MONOCYTES ABSOLUTE: 0.61 E9/L (ref 0.1–0.95)
MONOCYTES RELATIVE PERCENT: 8.8 % (ref 2–12)
NEUTROPHILS ABSOLUTE: 4.56 E9/L (ref 1.8–7.3)
NEUTROPHILS RELATIVE PERCENT: 65.5 % (ref 43–80)
PDW BLD-RTO: 13.8 FL (ref 11.5–15)
PHOSPHORUS: 4 MG/DL (ref 2.5–4.5)
PLATELET # BLD: 207 E9/L (ref 130–450)
PMV BLD AUTO: 10.6 FL (ref 7–12)
POTASSIUM SERPL-SCNC: 4.8 MMOL/L (ref 3.5–5)
PROTEIN PROTEIN: 14 MG/DL (ref 0–12)
PROTEIN/CREAT RATIO: 0.1
PROTEIN/CREAT RATIO: 0.1 (ref 0–0.2)
RBC # BLD: 4.56 E12/L (ref 3.8–5.8)
SODIUM BLD-SCNC: 140 MMOL/L (ref 132–146)
TOTAL PROTEIN: 7.2 G/DL (ref 6.4–8.3)
URIC ACID, SERUM: 6.1 MG/DL (ref 3.4–7)
WBC # BLD: 7 E9/L (ref 4.5–11.5)

## 2022-01-13 PROCEDURE — 82570 ASSAY OF URINE CREATININE: CPT

## 2022-01-13 PROCEDURE — 84550 ASSAY OF BLOOD/URIC ACID: CPT

## 2022-01-13 PROCEDURE — 83735 ASSAY OF MAGNESIUM: CPT

## 2022-01-13 PROCEDURE — 84156 ASSAY OF PROTEIN URINE: CPT

## 2022-01-13 PROCEDURE — 80053 COMPREHEN METABOLIC PANEL: CPT

## 2022-01-13 PROCEDURE — 84100 ASSAY OF PHOSPHORUS: CPT

## 2022-01-13 PROCEDURE — 85025 COMPLETE CBC W/AUTO DIFF WBC: CPT

## 2022-01-13 PROCEDURE — 36415 COLL VENOUS BLD VENIPUNCTURE: CPT

## 2022-01-25 RX ORDER — DOFETILIDE 0.12 MG/1
CAPSULE ORAL
Qty: 180 CAPSULE | Refills: 1 | Status: SHIPPED
Start: 2022-01-25 | End: 2022-08-15

## 2022-01-25 NOTE — TELEPHONE ENCOUNTER
Requesting Tikosyn refill - CrCl calculated off the following information:    Tikosyn dosage: 125 mcg bid    Age: 66   Ht: 1.93 m  Wt: 131.5 kg  Cr: 1.3 mg/dl (based off labs on 1/22)  CrCl: 63.03 mL/min    Last QTc: 403 msec (based on last EKG on 1/22)    Reviewed by EP Rn

## 2022-01-28 ENCOUNTER — HOSPITAL ENCOUNTER (OUTPATIENT)
Age: 72
Discharge: HOME | End: 2022-01-28
Payer: MEDICARE

## 2022-01-28 LAB
CHOLESTEROL, TOTAL: 103 MG/DL (ref 0–199)
ESTRADIOL LEVEL: 53.7 PG/ML
HBA1C MFR BLD: 6.7 % (ref 4–5.6)
HDLC SERPL-MCNC: 37 MG/DL
LDL CHOLESTEROL CALCULATED: 47 MG/DL (ref 0–99)
T3 UPTAKE PERCENT: 35.9 % (ref 22.5–37)
TRIGL SERPL-MCNC: 93 MG/DL (ref 0–149)
TSH SERPL DL<=0.05 MIU/L-ACNC: 1.75 UIU/ML (ref 0.27–4.2)
VITAMIN D 25-HYDROXY: 40 NG/ML (ref 30–100)
VLDLC SERPL CALC-MCNC: 19 MG/DL

## 2022-01-28 PROCEDURE — 84443 ASSAY THYROID STIM HORMONE: CPT

## 2022-01-28 PROCEDURE — 82670 ASSAY OF TOTAL ESTRADIOL: CPT

## 2022-01-28 PROCEDURE — 80061 LIPID PANEL: CPT

## 2022-01-28 PROCEDURE — 84403 ASSAY OF TOTAL TESTOSTERONE: CPT

## 2022-01-28 PROCEDURE — 83036 HEMOGLOBIN GLYCOSYLATED A1C: CPT

## 2022-01-28 PROCEDURE — 84305 ASSAY OF SOMATOMEDIN: CPT

## 2022-01-28 PROCEDURE — 83018 HEAVY METAL QUAN EACH NES: CPT

## 2022-01-28 PROCEDURE — 84630 ASSAY OF ZINC: CPT

## 2022-01-28 PROCEDURE — 36415 COLL VENOUS BLD VENIPUNCTURE: CPT

## 2022-01-28 PROCEDURE — 82627 DEHYDROEPIANDROSTERONE: CPT

## 2022-01-28 PROCEDURE — 82397 CHEMILUMINESCENT ASSAY: CPT

## 2022-01-28 PROCEDURE — 84140 ASSAY OF PREGNENOLONE: CPT

## 2022-01-28 PROCEDURE — 84479 ASSAY OF THYROID (T3 OR T4): CPT

## 2022-01-28 PROCEDURE — 84270 ASSAY OF SEX HORMONE GLOBUL: CPT

## 2022-01-28 PROCEDURE — 82306 VITAMIN D 25 HYDROXY: CPT

## 2022-01-31 LAB
DHEAS (DHEA SULFATE): 160 UG/DL (ref 28–175)
SEX HORMONE BINDING GLOBULIN: 38 NMOL/L (ref 11–80)
TESTOSTERONE FREE PERCENT: 1.9 % (ref 1.6–2.9)
TESTOSTERONE FREE, CALC: 136 PG/ML (ref 47–244)
TESTOSTERONE TOTAL-MALE: 723 NG/DL (ref 300–720)
TESTOSTERONE, BIOAVAILABLE: 371 NG/DL (ref 131–682)

## 2022-02-01 LAB
IGF BINDING PROTEIN-3: 6200 NG/ML (ref 2698–5688)
IGF-1 (INSULIN-LIKE GROWTH I): 156 NG/ML (ref 25–242)
INSULIN-LIKE GROWTH FACTOR-1 Z-SCORE: 0.9
SEX HORMONE BINDING GLOBULIN: 37 NMOL/L (ref 11–80)
TESTOSTERONE FREE-NONMALE: 149.9 PG/ML (ref 47–244)
TESTOSTERONE TOTAL: 716 NG/DL (ref 220–1000)

## 2022-02-02 LAB — PREGNENOLONE: 20 NG/DL (ref 23–173)

## 2022-02-03 LAB
Lab: NORMAL
REPORT: NORMAL
THIS TEST SENT TO: NORMAL

## 2022-02-07 LAB
Lab: NORMAL
REPORT: NORMAL
THIS TEST SENT TO: NORMAL

## 2022-05-09 ENCOUNTER — TELEPHONE (OUTPATIENT)
Dept: NON INVASIVE DIAGNOSTICS | Age: 72
End: 2022-05-09

## 2022-05-09 NOTE — TELEPHONE ENCOUNTER
I returned patient's call. He stated his watch has indicated that he has been having episodes of atrial fib. He states he feels fine. He even walked a couple of miles today and tolerated it well. I explained to him his home monitor will not necessarily send an alert unless he was having longer episodes of af. I also said he may be having an early beat the watch is saying is atrial fib. I asked him to send a remote [employer] transmission so we can look over his arrhythmia logs. He will send a remote transmission.     Marah Huber RN, BSN  [facility]

## 2022-05-10 ENCOUNTER — TELEPHONE (OUTPATIENT)
Dept: NON INVASIVE DIAGNOSTICS | Age: 72
End: 2022-05-10

## 2022-05-10 NOTE — TELEPHONE ENCOUNTER
Spoke to patient via telephone. Informed him that his device transmission shows that he is in AT/AF, and that he could have a cardioversion done if he wishes. The patient verbalizes that he feels fine at the present time, and is currently symptom free. States he walked 3 miles yesterday. Will relay the info to Dr Stephen Ferrer, and instructed the patient to call the device if his condition changes. ----- Message from Peace Hair MD sent at 5/10/2022 12:48 PM EDT -----  Please schedule DC-CV if he agrees. Thanks.  ----- Message -----  From: Karin Chavez RN  Sent: 5/10/2022   7:06 AM EDT  To: Peace Hair MD    Patient activated transmission from 05/09/2022 show AT/AF burden of 100%. Told device clinic nurse he feels fine and is staying active. Is on tikosyn toprol and xarelto.

## 2022-05-26 ENCOUNTER — OFFICE VISIT (OUTPATIENT)
Dept: CARDIOLOGY CLINIC | Age: 72
End: 2022-05-26
Payer: MEDICARE

## 2022-05-26 ENCOUNTER — HOSPITAL ENCOUNTER (OUTPATIENT)
Age: 72
Discharge: HOME | End: 2022-05-26
Payer: MEDICARE

## 2022-05-26 VITALS
BODY MASS INDEX: 35.61 KG/M2 | OXYGEN SATURATION: 96 % | WEIGHT: 292.4 LBS | HEIGHT: 76 IN | DIASTOLIC BLOOD PRESSURE: 68 MMHG | HEART RATE: 116 BPM | RESPIRATION RATE: 12 BRPM | SYSTOLIC BLOOD PRESSURE: 126 MMHG

## 2022-05-26 DIAGNOSIS — I48.0 PAROXYSMAL ATRIAL FIBRILLATION (HCC): ICD-10-CM

## 2022-05-26 DIAGNOSIS — I42.8 NONISCHEMIC CARDIOMYOPATHY (HCC): Primary | ICD-10-CM

## 2022-05-26 DIAGNOSIS — I49.5 SINUS NODE DYSFUNCTION (HCC): ICD-10-CM

## 2022-05-26 DIAGNOSIS — E11.22 TYPE 2 DIABETES MELLITUS WITH STAGE 3B CHRONIC KIDNEY DISEASE, WITH LONG-TERM CURRENT USE OF INSULIN (HCC): ICD-10-CM

## 2022-05-26 DIAGNOSIS — G47.33 OBSTRUCTIVE SLEEP APNEA: ICD-10-CM

## 2022-05-26 DIAGNOSIS — E78.2 MIXED HYPERLIPIDEMIA: ICD-10-CM

## 2022-05-26 DIAGNOSIS — Z79.4 TYPE 2 DIABETES MELLITUS WITH STAGE 3B CHRONIC KIDNEY DISEASE, WITH LONG-TERM CURRENT USE OF INSULIN (HCC): ICD-10-CM

## 2022-05-26 DIAGNOSIS — E66.8 MODERATE OBESITY: ICD-10-CM

## 2022-05-26 DIAGNOSIS — I50.42 CHRONIC COMBINED SYSTOLIC AND DIASTOLIC CONGESTIVE HEART FAILURE (HCC): ICD-10-CM

## 2022-05-26 DIAGNOSIS — N18.32 TYPE 2 DIABETES MELLITUS WITH STAGE 3B CHRONIC KIDNEY DISEASE, WITH LONG-TERM CURRENT USE OF INSULIN (HCC): ICD-10-CM

## 2022-05-26 LAB
ANION GAP SERPL CALCULATED.3IONS-SCNC: 13 MMOL/L (ref 7–16)
BUN BLDV-MCNC: 26 MG/DL (ref 6–23)
CALCIUM SERPL-MCNC: 9.6 MG/DL (ref 8.6–10.2)
CHLORIDE BLD-SCNC: 103 MMOL/L (ref 98–107)
CO2: 22 MMOL/L (ref 22–29)
CREAT SERPL-MCNC: 1.5 MG/DL (ref 0.7–1.2)
GFR AFRICAN AMERICAN: 55
GFR NON-AFRICAN AMERICAN: 46 ML/MIN/1.73
GLUCOSE BLD-MCNC: 220 MG/DL (ref 74–99)
POTASSIUM SERPL-SCNC: 4.6 MMOL/L (ref 3.5–5)
SODIUM BLD-SCNC: 138 MMOL/L (ref 132–146)

## 2022-05-26 PROCEDURE — 93000 ELECTROCARDIOGRAM COMPLETE: CPT | Performed by: INTERNAL MEDICINE

## 2022-05-26 PROCEDURE — 3044F HG A1C LEVEL LT 7.0%: CPT | Performed by: INTERNAL MEDICINE

## 2022-05-26 PROCEDURE — 1123F ACP DISCUSS/DSCN MKR DOCD: CPT | Performed by: INTERNAL MEDICINE

## 2022-05-26 PROCEDURE — 99215 OFFICE O/P EST HI 40 MIN: CPT | Performed by: INTERNAL MEDICINE

## 2022-05-26 PROCEDURE — 36415 COLL VENOUS BLD VENIPUNCTURE: CPT

## 2022-05-26 PROCEDURE — 80048 BASIC METABOLIC PNL TOTAL CA: CPT

## 2022-05-26 RX ORDER — SODIUM CHLORIDE 0.9 % (FLUSH) 0.9 %
5-40 SYRINGE (ML) INJECTION EVERY 12 HOURS SCHEDULED
Status: CANCELLED | OUTPATIENT
Start: 2022-05-26

## 2022-05-26 RX ORDER — SODIUM CHLORIDE 9 MG/ML
INJECTION, SOLUTION INTRAVENOUS PRN
Status: CANCELLED | OUTPATIENT
Start: 2022-05-26

## 2022-05-26 RX ORDER — SODIUM CHLORIDE 0.9 % (FLUSH) 0.9 %
5-40 SYRINGE (ML) INJECTION PRN
Status: CANCELLED | OUTPATIENT
Start: 2022-05-26

## 2022-05-26 RX ORDER — TADALAFIL 5 MG/1
5 TABLET ORAL DAILY
COMMUNITY

## 2022-05-26 NOTE — PROGRESS NOTES
OUTPATIENT CARDIOLOGY FOLLOW-UP    Name: Devaughn Branch    Age: 67 y.o. Primary Care Physician: Black Ruelas MD    Date of Service: 5/26/2022    Chief Complaint: Nonischemic cardiomyopathy, chronic combined systolic and diastolic heart failure, paroxysmal atrial fibrillation with associated sinus node dysfunction, hypertension, chronic kidney disease, hypertension, moderate obesity, obstructive sleep apnea    Interim History: Since his most recent evaluation, the patient was compensated at the time of most recent electrophysiology assessment with maintenance of sinus rhythm. Device interrogations at that time and subsequently 3 months later demonstrated evidence of sinus rhythm with limited atrial arrhythmias and appropriate device function. Subsequently, he has noted intermittent palpitations with his smart watch suggesting the presence of atrial fibrillation and subsequent device transmission demonstrating persistent atrial fibrillation beginning approximately 3 weeks earlier with suboptimal rate control. Beyond that of intermittent palpitations, he notes minimal symptomatology with no changes of his functional capabilities and the specific denial of dyspnea or other manifestations of decompensated left ventricular systolic dysfunction or volume overload. He relates no symptoms of a focal neurologic origin or bleeding in the face of his chronic anticoagulation. At the time of evaluation, he additionally denies symptoms of anginal-like chest discomfort or or other ischemic equivalents. Of additional note, he has recently initiated testosterone supplementation. At the time of his present evaluation, his weight remains essentially stable and he remains compliant his previous with nocturnal CPAP and is additionally normotensive. Acceptable control of his diabetes has been maintained with glycosylated hemoglobin levels of approximately 6.5%. Review of Systems:    The remainder of a complete multisystem review including consitutional, central nervous, respiratory, circulatory, gastrointestinal, genitourinary, endocrinologic, hematologic, musculoskeletal and psychiatric are negative.     Past Medical History:  Past Medical History:   Diagnosis Date    Arthritis     Asbestos exposure     Atrial fibrillation ([facility] 2016    w/RVR    BPH (benign prostatic hyperplasia)     Cardiomyopathy ([facility]     CHF (congestive heart failure) (HCC)     CKD (chronic kidney disease)     Diabetes mellitus ([facility]     History of cardioversion 2018    admitted for [facility] therapy    Hyperlipidemia     Hypertension     Obesity     Sleep apnea        Past Surgical History:  Past Surgical History:   Procedure Laterality Date    ATRIAL ABLATION SURGERY  2017    atrial fib    CARDIOVERSION  2016    CARDIOVERSION  11/15/2016    [facility]    CARDIOVERSION  2017    Dr. Taylor Thomas  2017    NASAL [facility]    PACEMAKER INSERTION  2018    D-PPM  ([employer])  Paula Shannajonh    ROTATOR CUFF REPAIR Right 2015    TRANSESOPHAGEAL ECHOCARDIOGRAM  2017       Family History:  Family History   Problem Relation Age of Onset    Alcohol Abuse Mother     Alcohol Abuse Father     Cancer Brother         prostate    Heart Disease Maternal Grandfather        Social History:  Social History     Socioeconomic History    Marital status:      Spouse name: Not on file    Number of children: Not on file    Years of education: Not on file    Highest education level: Not on file   Occupational History    Not on file   Tobacco Use    Smoking status: Former Smoker     Packs/day: 1.00     Years: 5.00     Pack years: 5.00     Types: Cigarettes     Quit date: 1972     Years since quittin.4    Smokeless tobacco: Never Used   Vaping Use    Vaping Use: Never used   Substance and Sexual Activity    Alcohol use: Not Currently    Drug use: Never    Sexual activity: Not on file   Other Topics Concern    Not on file   Social History Narrative    Drinks 1 pot of coffee daily. Social Determinants of Health     Financial Resource Strain:     Difficulty of Paying Living Expenses: Not on file   Food Insecurity:     Worried About Running Out of Food in the Last Year: Not on file    Armando of Food in the Last Year: Not on file   Transportation Needs:     Lack of Transportation (Medical): Not on file    Lack of Transportation (Non-Medical):  Not on file   Physical Activity:     Days of Exercise per Week: Not on file    Minutes of Exercise per Session: Not on file   Stress:     Feeling of Stress : Not on file   Social Connections:     Frequency of Communication with Friends and Family: Not on file    Frequency of Social Gatherings with Friends and Family: Not on file    Attends Worship Services: Not on file    Active Member of [address] [place] [address] [employer] or Organizations: Not on file    Attends Club or Organization Meetings: Not on file    Marital Status: Not on file   Intimate Partner Violence:     Fear of Current or Ex-Partner: Not on file    Emotionally Abused: Not on file    Physically Abused: Not on file    Sexually Abused: Not on file   Housing Stability:     Unable to Pay for Housing in the Last Year: Not on file    Number of [place] in the Last Year: Not on file    Unstable Housing in the Last Year: Not on file       Allergies:  No Known Allergies    Current Medications:  Current Outpatient Medications   Medication Sig Dispense Refill    tadalafil (CIALIS) 5 MG tablet Take 5 mg by mouth daily      Cholecalciferol (VITAMIN D3) 1.25 MG (16053 UT) TABS Take 1 capsule by mouth once a week      NONFORMULARY Place under the tongue daily Human growth hormone      dofetilide (TIKOSYN) 125 MCG capsule TAKE 1 CAPSULE EVERY 12 HOURS 180 capsule 1    Pregnenolone Micronized (PREGNENOLONE PO) Take 2 tablets by mouth Daily with supper      TESTOSTERONE IM Inject into the muscle  metoprolol succinate (TOPROL XL) 50 MG extended release tablet TAKE 1 TABLET TWICE A  tablet 3    furosemide (LASIX) 20 MG tablet TAKE 1 TABLET TWICE A DAY (Patient taking differently: Take 20 mg by mouth TAKE) 180 tablet 3    rivaroxaban (XARELTO) 20 MG TABS tablet TAKE 1 TABLET DAILY 90 tablet 3    lisinopril (PRINIVIL;ZESTRIL) 5 MG tablet daily       NONFORMULARY CBD Gummies      magnesium oxide (MAG-OX) 400 (240 Mg) MG tablet Take 1 tablet by mouth daily 30 tablet 5    metFORMIN (GLUCOPHAGE) 1000 MG tablet Take 1 tablet by mouth 2 times daily (with meals) 60 tablet 3    linagliptin (TRADJENTA) 5 MG tablet Take 5 mg by mouth daily      Glucosamine 500 MG CAPS Take 2,000 mg by mouth daily       JARDIANCE 25 MG tablet Take 25 mg by mouth daily       glipiZIDE (GLUCOTROL) 10 MG tablet Take 10 mg by mouth 2 times daily (before meals)       LANTUS SOLOSTAR 100 UNIT/ML injection pen Inject 25 Units into the skin nightly       atorvastatin (LIPITOR) 40 MG tablet Take 40 mg by mouth daily       fenofibric acid (FIBRICOR) 135 MG CPDR capsule Take 135 mg by mouth daily       Multiple Vitamin (MULTI VITAMIN DAILY PO) Take by mouth daily      CPAP Machine MISC Inhale into the lungs nightly       tamsulosin (FLOMAX) 0.4 MG capsule Take 0.4 mg by mouth daily. No current facility-administered medications for this visit. Physical Exam:  /68 (Site: Left Upper Arm, Position: Sitting, Cuff Size: Medium Adult)   Pulse (!) 116   Resp 12   Ht 6' 4\" (1.93 m)   Wt 292 lb 6.4 oz (132.6 kg)   SpO2 96%   BMI 35.59 kg/m²   Wt Readings from Last 3 Encounters:   05/26/22 292 lb 6.4 oz (132.6 kg)   01/06/22 290 lb (131.5 kg)   04/19/21 289 lb (131.1 kg)     The patient is awake, alert and in no discomfort or distress. No gross musculoskeletal deformity is present. No significant skin or nail changes are present. Gross examination of head, eyes, nose and throat are negative.  Jugular venous pressure is normal and no carotid bruits are present. Normal respiratory effort is noted with no accessory muscle usage present. Lung fields are clear to ascultation. Cardiac examination is notable for an irregular rate and rhythm with no palpable thrill. No gallop rhythm or cardiac murmur are identified. A benign abdominal examination is present with the exception of obesity and no masses or organomegaly. Intact pulses are present throughout all extremities and no peripheral edema is present. No focal neurologic deficits are present. Laboratory Tests:  Lab Results   Component Value Date    CREATININE 1.3 (H) 01/13/2022    BUN 19 01/13/2022     01/13/2022    K 4.8 01/13/2022     01/13/2022    CO2 26 01/13/2022     No results found for: BNP  Lab Results   Component Value Date    WBC 7.0 01/13/2022    RBC 4.56 01/13/2022    HGB 14.0 01/13/2022    HCT 42.5 01/13/2022    MCV 93.2 01/13/2022    MCH 30.7 01/13/2022    MCHC 32.9 01/13/2022    RDW 13.8 01/13/2022     01/13/2022    MPV 10.6 01/13/2022     No results for input(s): ALKPHOS, ALT, AST, PROT, BILITOT, BILIDIR, LABALBU in the last 72 hours.   Lab Results   Component Value Date    MG 2.0 01/13/2022     Lab Results   Component Value Date    PROTIME 15.7 12/05/2018    INR 1.4 12/05/2018     Lab Results   Component Value Date    TSH 1.750 01/28/2022     No components found for: CHLPL  Lab Results   Component Value Date    TRIG 93 01/28/2022    TRIG 189 (H) 06/24/2020    TRIG 91 01/15/2020     Lab Results   Component Value Date    HDL 37 01/28/2022    HDL 34 06/11/2021    HDL 41 01/26/2021     Lab Results   Component Value Date    LDLCALC 47 01/28/2022    LDLCALC 64 06/11/2021    [address] 61 01/26/2021       Cardiac Tests:  ECG: A resting electrocardiogram demonstrates evidence of atrial fibrillation with a mean ventricular response of approximately 110-120 bpm with low voltage in the limb leads, left axis deviation, and intraventricular conduction delay, delayed precordial transition zone and nonspecific ST changes      ASSESSMENT / PLAN: On a clinical basis, the patient presently remains compensated from a cardiovascular standpoint and spite the development of atrial fibrillation with marginal rate control. I have extensively discussed this with him including his risks of progressive systolic dysfunction on the basis of tachycardia and have recommended a repeat attempt of electrical cardioversion in attempt to restore sinus rhythm and both mechanical synchrony and improved heart rate control to reduce risk of adverse cardiovascular effects. Following discussion of potential benefits, risks and alternatives, he is agreeable in proceeding. I additionally have discussed concerns regarding continued testosterone supplementation. He will continue to benefit from appropriate lifestyle modification to achieve weight reduction to benefit diastolic cardiac performance and assist in management of his obstructive sleep apnea to reduce risk of further adverse cardiovascular effects including that of recurrent arrhythmias in addition to that of aggressive risk factor modification of blood pressure, diabetes and serum lipids in attempt to reduce risk of future atherosclerotic development. I presently plan his clinical reassessment approximately 1 month and would happily reevaluate him in the interim should additional cardiovascular difficulties or concerns arise and following his clinical reassessment plan reevaluation of left ventricular systolic function with a follow-up limited echocardiogram.    The patient's current medication list, allergies, problem list and results of all previously ordered testing were reviewed at today's visit. Follow-up office visit in 1 month      Note: This report was completed using computerized voice recognition software.  Every effort has been made to ensure accuracy, however; inadvertent computerized transcription errors may be present. Jayla Cdeeno.  [facility], [facility]    An electronic copy of this follow-up progress note was forwarded to Dr. Patricia Good and Lia Domingo

## 2022-06-03 ENCOUNTER — HOSPITAL ENCOUNTER (OUTPATIENT)
Dept: PREADMISSION TESTING | Age: 72
Discharge: HOME | End: 2022-06-03

## 2022-06-03 VITALS — BODY MASS INDEX: 35.31 KG/M2 | HEIGHT: 76 IN | WEIGHT: 290 LBS

## 2022-06-03 NOTE — PROGRESS NOTES
[facility] PRE-ADMISSION TESTING INSTRUCTIONS    The Preadmission Testing patient is instructed accordingly using the following criteria (check applicable):    ARRIVAL INSTRUCTIONS:  [x] Parking the day of Surgery is located in the Main Entrance lot. Upon entering the door, make an immediate right to the surgery reception desk    [x] Bring photo ID and insurance card    [] Bring in a copy of Living will or Durable Power of  papers. [x] Please be sure to arrange for responsible adult to provide transportation to and from the hospital    [x] Please arrange for responsible adult to be with you for the 24 hour period post procedure due to having anesthesia    [x] If you awake am of surgery not feeling well or have temperature >100 please call [phone]    GENERAL INSTRUCTIONS:    [x] Nothing by mouth after midnight, including gum, candy, mints or water    [x] You may brush your teeth, but do not swallow any water    [x] Take medications as instructed with 1-2 oz of water    [x] Stop herbal supplements and vitamins 5 days prior to procedure    [x] Follow preop dosing of blood thinners per physician instructions    [x] Take 1/2 dose of evening insulin, but no insulin after midnight    [x] No oral diabetic medications after midnight    [x] If diabetic and have low blood sugar or feel symptomatic, take 1-2oz apple juice only    [] Bring inhalers day of surgery    [] Bring C-PAP/ Bi-Pap day of surgery    [] Bring urine specimen day of surgery    [x] Shower or bath with soap, lather and rinse well, AM of Surgery, no lotion, powders or creams     [] Follow bowel prep as instructed per surgeon    [x] No tobacco products within 24 hours of surgery     [x] No alcohol or illegal drug use within 24 hours of surgery.     [x] Jewelry, body piercing's, eyeglasses, contact lenses and dentures are not permitted into surgery (bring cases)      [] Please do not wear any nail polish, make up or hair products on the day of surgery    [x] You can expect a call the business day prior to procedure to notify you if your arrival time changes    [x] If you receive a survey after surgery we would greatly appreciate your comments    [] Parent/guardian of a minor must accompany their child and remain on the premises  the entire time they are under our care     [] Pediatric patients may bring favorite toy, blanket or comfort item with them    [] A caregiver or family member must remain with the patient during their stay if they are mentally handicapped, have dementia, disoriented or unable to use a call light or would be a safety concern if left unattended    [x] Please notify surgeon if you develop any illness between now and time of surgery (cold, cough, sore throat, fever, nausea, vomiting) or any signs of infections  including skin, wounds, and dental.    [x]  The Outpatient Pharmacy is available to fill your prescription here on your day of surgery, ask your preop nurse for details    [x] Other instructions: wear loose, comfortable clothing    EDUCATIONAL MATERIALS PROVIDED:    [] PAT Preoperative Education Packet/Booklet     [] Medication List    [] Transfusion bracelet applied with instructions    [] Shower with soap, lather and rinse well, and use CHG wipes provided the evening before surgery as instructed    [] Incentive spirometer with instructions

## 2022-06-07 ENCOUNTER — HOSPITAL ENCOUNTER (OUTPATIENT)
Dept: INPATIENT UNIT | Age: 72
Setting detail: OUTPATIENT SURGERY
Discharge: HOME | End: 2022-06-07
Payer: MEDICARE

## 2022-06-07 ENCOUNTER — ANESTHESIA EVENT (OUTPATIENT)
Dept: INPATIENT UNIT | Age: 72
End: 2022-06-07

## 2022-06-07 ENCOUNTER — ANESTHESIA (OUTPATIENT)
Dept: INPATIENT UNIT | Age: 72
End: 2022-06-07

## 2022-06-07 VITALS
TEMPERATURE: 97.6 F | HEART RATE: 77 BPM | DIASTOLIC BLOOD PRESSURE: 77 MMHG | OXYGEN SATURATION: 99 % | SYSTOLIC BLOOD PRESSURE: 114 MMHG | RESPIRATION RATE: 15 BRPM

## 2022-06-07 LAB
EKG ATRIAL RATE: 100 BPM
EKG ATRIAL RATE: 70 BPM
EKG P AXIS: -73 DEGREES
EKG P-R INTERVAL: 258 MS
EKG Q-T INTERVAL: 380 MS
EKG Q-T INTERVAL: 416 MS
EKG QRS DURATION: 106 MS
EKG QRS DURATION: 98 MS
EKG QTC CALCULATION (BAZETT): 449 MS
EKG QTC CALCULATION (BAZETT): 459 MS
EKG R AXIS: -57 DEGREES
EKG R AXIS: -78 DEGREES
EKG T AXIS: -53 DEGREES
EKG T AXIS: 149 DEGREES
EKG VENTRICULAR RATE: 70 BPM
EKG VENTRICULAR RATE: 88 BPM
METER GLUCOSE: 75 MG/DL (ref 74–99)

## 2022-06-07 PROCEDURE — 6360000002 HC RX W HCPCS: Performed by: ANESTHESIOLOGIST ASSISTANT

## 2022-06-07 PROCEDURE — 93005 ELECTROCARDIOGRAM TRACING: CPT

## 2022-06-07 PROCEDURE — 3700000000 HC ANESTHESIA ATTENDED CARE

## 2022-06-07 PROCEDURE — 7100000010 HC PHASE II RECOVERY - FIRST 15 MIN

## 2022-06-07 PROCEDURE — 82962 GLUCOSE BLOOD TEST: CPT

## 2022-06-07 PROCEDURE — 7100000011 HC PHASE II RECOVERY - ADDTL 15 MIN

## 2022-06-07 PROCEDURE — 2580000003 HC RX 258: Performed by: INTERNAL MEDICINE

## 2022-06-07 PROCEDURE — 3700000001 HC ADD 15 MINUTES (ANESTHESIA)

## 2022-06-07 PROCEDURE — 92960 CARDIOVERSION ELECTRIC EXT: CPT | Performed by: INTERNAL MEDICINE

## 2022-06-07 PROCEDURE — 92960 CARDIOVERSION ELECTRIC EXT: CPT

## 2022-06-07 RX ORDER — SODIUM CHLORIDE 0.9 % (FLUSH) 0.9 %
5-40 SYRINGE (ML) INJECTION PRN
Status: DISCONTINUED | OUTPATIENT
Start: 2022-06-07 | End: 2022-06-08 | Stop reason: HOSPADM

## 2022-06-07 RX ORDER — PROPOFOL 10 MG/ML
INJECTION, EMULSION INTRAVENOUS PRN
Status: DISCONTINUED | OUTPATIENT
Start: 2022-06-07 | End: 2022-06-07 | Stop reason: SDUPTHER

## 2022-06-07 RX ORDER — FENTANYL CITRATE 50 UG/ML
INJECTION, SOLUTION INTRAMUSCULAR; INTRAVENOUS PRN
Status: DISCONTINUED | OUTPATIENT
Start: 2022-06-07 | End: 2022-06-07 | Stop reason: SDUPTHER

## 2022-06-07 RX ORDER — SODIUM CHLORIDE 0.9 % (FLUSH) 0.9 %
5-40 SYRINGE (ML) INJECTION EVERY 12 HOURS SCHEDULED
Status: DISCONTINUED | OUTPATIENT
Start: 2022-06-07 | End: 2022-06-08 | Stop reason: HOSPADM

## 2022-06-07 RX ORDER — SODIUM CHLORIDE 9 MG/ML
INJECTION, SOLUTION INTRAVENOUS PRN
Status: DISCONTINUED | OUTPATIENT
Start: 2022-06-07 | End: 2022-06-08 | Stop reason: HOSPADM

## 2022-06-07 RX ADMIN — FENTANYL CITRATE 75 MCG: 50 INJECTION, SOLUTION INTRAMUSCULAR; INTRAVENOUS at 12:54

## 2022-06-07 RX ADMIN — PROPOFOL 60 MG: 10 INJECTION, EMULSION INTRAVENOUS at 12:58

## 2022-06-07 RX ADMIN — SODIUM CHLORIDE: 9 INJECTION, SOLUTION INTRAVENOUS at 12:50

## 2022-06-07 RX ADMIN — PROPOFOL 20 MG: 10 INJECTION, EMULSION INTRAVENOUS at 13:00

## 2022-06-07 ASSESSMENT — PAIN - FUNCTIONAL ASSESSMENT: PAIN_FUNCTIONAL_ASSESSMENT: NONE - DENIES PAIN

## 2022-06-07 ASSESSMENT — LIFESTYLE VARIABLES: SMOKING_STATUS: 0

## 2022-06-07 NOTE — PROGRESS NOTES
Patient Name: [facility]   Medical Record Number: [id number]  Date: 6/7/2022   Time: 1:25 PM   Room/Bed: Room/bed info not found  Cardioversion Procedure Note  Indication: atrial fibrillation    Consent: The patient provided verbal consent for this procedure. Pre-Medication:  Per anesthesia    Procedure: The patient was placed in the supine position and the chest area was exposed. The cardioversion pads were applied in the standard manner and configuration. Attempt #1: The defibrillator was set on the synchronous mode and charged to 200 joules. A charge was then delivered which resulted in conversion to normal sinus rhythm. Attempt #2: Not necessary    Attempt #3: Not necessary    The patient tolerated the procedure well.     Complications: None    Electronically Signed by: [email or web address]@

## 2022-06-07 NOTE — PROGRESS NOTES
[employer] called, requesting cardiologist be notified of SVT episodes 6/5, 6/4. Dr. Felisa Hill updated- no further orders received.

## 2022-06-07 NOTE — ANESTHESIA POSTPROCEDURE EVALUATION
Department of Anesthesiology  Postprocedure Note    Patient: Irina Petersen  MRN: [id number]  YOB: 1950  Date of evaluation: 6/7/2022  Time:  3:30 PM     Procedure Summary     Date: 06/07/22 Room / Location: [facility] Stage I    Anesthesia Start: 1250 Anesthesia Stop: 7314    Procedure: CARDIOVERSION Diagnosis: Paroxysmal atrial fibrillation    Scheduled Providers:  Responsible Provider: Malissa Kothari MD    Anesthesia Type: MAC ASA Status: 4          Anesthesia Type: MAC    Brandon Phase I: Brandon Score: 10    Brandon Phase II: Brandon Score: 10    Last vitals: Reviewed and per EMR flowsheets.        Anesthesia Post Evaluation    Patient location during evaluation: bedside  Patient participation: complete - patient participated  Level of consciousness: awake  Pain score: 0  Airway patency: patent  Nausea & Vomiting: no nausea and no vomiting  Complications: no  Cardiovascular status: hemodynamically stable  Respiratory status: acceptable  Hydration status: euvolemic

## 2022-06-07 NOTE — PROCEDURES
The patient presented in the post absorptive state following the documentation of persistent atrial fibrillation with documentation of therapeutic anticoagulation. Following obtaining informed consent, an intravenous catheter was placed and with the assistance of the Anesthesia service conscious sedation was achieved via the administration of 80 milligrams of propofol. Following the achievement of adequate sedation synchronized direct current cardioversion was performed with 200 watt-seconds restoring sinus rhythm. The patient tolerated the procedure without complication and was hemodynamically and neurologically intact following anesthesia recovery prior to discharge.

## 2022-06-07 NOTE — ANESTHESIA PRE PROCEDURE
Department of Anesthesiology  Preprocedure Note       Name:  Jac Jose   Age:  67 y.o.  :  1950                                          MRN:  [id number]         Date:  2022      Surgeon: * No surgeons listed *    Procedure: * No procedures listed *    Medications prior to admission:   Prior to Admission medications    Medication Sig Start Date End Date Taking?  Authorizing Provider   tadalafil (CIALIS) 5 MG tablet Take 5 mg by mouth daily    Historical Provider, MD   Cholecalciferol (VITAMIN D3) 1.25 MG (91203 UT) TABS Take 1 capsule by mouth once a week    Historical Provider, MD   NONFORMULARY Place under the tongue at bedtime Human growth hormone   spray    Historical Provider, MD   dofetilide (TIKOSYN) 125 MCG capsule TAKE 1 CAPSULE EVERY 12 HOURS 22   Alisha Gibbs MD   Pregnenolone Micronized (PREGNENOLONE PO) Take 2 tablets by mouth Daily with supper    Historical Provider, MD   TESTOSTERONE IM Inject into the muscle Takes every 5 months   implant    Historical Provider, MD   metoprolol succinate (TOPROL XL) 50 MG extended release tablet TAKE 1 TABLET TWICE A DAY 21   Alisha Gibbs MD   furosemide (LASIX) 20 MG tablet TAKE 1 TABLET TWICE A DAY  Patient taking differently: Take 20 mg by mouth TAKE 21   Tod Valdez MD   rivaroxaban (XARELTO) 20 MG TABS tablet TAKE 1 TABLET DAILY 21   Tod Valdez MD   lisinopril (PRINIVIL;ZESTRIL) 5 MG tablet daily  20   Historical Provider, MD   NONFORMULARY CBD Gummies    Historical Provider, MD   magnesium oxide (MAG-OX) 400 (240 Mg) MG tablet Take 1 tablet by mouth daily 18   Jose Donald MD   metFORMIN (GLUCOPHAGE) 1000 MG tablet Take 1 tablet by mouth 2 times daily (with meals) 18   Jose Donald MD   linagliptin (TRADJENTA) 5 MG tablet Take 5 mg by mouth daily    Historical Provider, MD   Glucosamine 500 MG CAPS Take 2,000 mg by mouth daily     Historical Provider, MD   JARDIANCE 25 MG tablet Take 25 mg by mouth daily  3/5/18   Historical Provider, MD   glipiZIDE (GLUCOTROL) 10 MG tablet Take 10 mg by mouth 2 times daily (before meals)  3/5/18   Historical Provider, MD   LANROS SOLOSTAR 100 UNIT/ML injection pen Inject 25 Units into the skin nightly Taking 29 Units currently. . 3/5/18   Historical Provider, MD   atorvastatin (LIPITOR) 40 MG tablet Take 40 mg by mouth daily  7/6/17   Historical Provider, MD   fenofibric acid (FIBRICOR) 135 MG CPDR capsule Take 135 mg by mouth daily  4/7/17   Historical Provider, MD   Multiple Vitamin (MULTI VITAMIN DAILY PO) Take by mouth daily    Historical Provider, MD   CPAP Machine MISC Inhale into the lungs nightly     Historical Provider, MD   tamsulosin (FLOMAX) 0.4 MG capsule Take 0.4 mg by mouth daily.     Historical Provider, MD       Current medications:    Current Outpatient Medications   Medication Sig Dispense Refill    tadalafil (CIALIS) 5 MG tablet Take 5 mg by mouth daily      Cholecalciferol (VITAMIN D3) 1.25 MG (65244 UT) TABS Take 1 capsule by mouth once a week      NONFORMULARY Place under the tongue at bedtime Human growth hormone   spray      dofetilide (TIKOSYN) 125 MCG capsule TAKE 1 CAPSULE EVERY 12 HOURS 180 capsule 1    Pregnenolone Micronized (PREGNENOLONE PO) Take 2 tablets by mouth Daily with supper      TESTOSTERONE IM Inject into the muscle Takes every 5 months   implant      metoprolol succinate (TOPROL XL) 50 MG extended release tablet TAKE 1 TABLET TWICE A  tablet 3    furosemide (LASIX) 20 MG tablet TAKE 1 TABLET TWICE A DAY (Patient taking differently: Take 20 mg by mouth TAKE) 180 tablet 3    rivaroxaban (XARELTO) 20 MG TABS tablet TAKE 1 TABLET DAILY 90 tablet 3    lisinopril (PRINIVIL;ZESTRIL) 5 MG tablet daily       NONFORMULARY CBD Gummies      magnesium oxide (MAG-OX) 400 (240 Mg) MG tablet Take 1 tablet by mouth daily 30 tablet 5    metFORMIN (GLUCOPHAGE) 1000 MG tablet Take 1 tablet by mouth 2 times daily (with meals) 60 tablet 3    linagliptin (TRADJENTA) 5 MG tablet Take 5 mg by mouth daily      Glucosamine 500 MG CAPS Take 2,000 mg by mouth daily       JARDIANCE 25 MG tablet Take 25 mg by mouth daily       glipiZIDE (GLUCOTROL) 10 MG tablet Take 10 mg by mouth 2 times daily (before meals)       LANTUS SOLOSTAR 100 UNIT/ML injection pen Inject 25 Units into the skin nightly Taking 29 Units currently. Esther Calzada atorvastatin (LIPITOR) 40 MG tablet Take 40 mg by mouth daily       fenofibric acid (FIBRICOR) 135 MG CPDR capsule Take 135 mg by mouth daily       Multiple Vitamin (MULTI VITAMIN DAILY PO) Take by mouth daily      CPAP Machine MISC Inhale into the lungs nightly       tamsulosin (FLOMAX) 0.4 MG capsule Take 0.4 mg by mouth daily.        Current Facility-Administered Medications   Medication Dose Route Frequency Provider Last Rate Last Admin    0.9 % sodium chloride infusion   IntraVENous PRN Rola Rios MD        sodium chloride flush 0.9 % injection 5-40 mL  5-40 mL IntraVENous 2 times per day Rola Rios MD        sodium chloride flush 0.9 % injection 5-40 mL  5-40 mL IntraVENous PRN Rola Rios MD           Allergies:  No Known Allergies    Problem List:    Patient Active Problem List   Diagnosis Code    Persistent atrial fibrillation (HCC) I48.19    Type 2 diabetes mellitus with stage 3 chronic kidney disease, with long-term current use of insulin (HCC) E11.22, N18.30, Z79.4    Moderate obesity E66.8    LV dysfunction I51.9    Nonischemic cardiomyopathy ([address] I42.8    Chronic combined systolic and diastolic congestive heart failure ([address] I50.42    Obstructive sleep apnea G47.33    Chronic kidney disease, stage III (moderate) ([facility] N18.30    Mixed hyperlipidemia E78.2    Typical atrial flutter (HCC) I48.3    Paroxysmal atrial fibrillation (HCC) I48.0    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    Sinus node dysfunction ([facility] I49.5    Status post catheter ablation of atrial fibrillation Z98.890       Past Medical History:        Diagnosis Date    Arthritis     Asbestos exposure     Atrial fibrillation ([facility] 2016    w/RVR    BPH (benign prostatic hyperplasia)     Cardiomyopathy ([facility]     CHF (congestive heart failure) (HCC)     CKD (chronic kidney disease)     Diabetes mellitus ([facility]     History of cardioversion 2018    admitted for [facility] therapy    Hyperlipidemia     Hypertension     Obesity     Sleep apnea     CPAP       Past Surgical History:        Procedure Laterality Date    ATRIAL ABLATION SURGERY  2017    atrial fib    CARDIOVERSION  2016    CARDIOVERSION  11/15/2016    DCCV    CARDIOVERSION  2017    Dr. Jorge Prieto  2017    NASAL [facility] [place] [place]    PACEMAKER INSERTION  2018    D-PPM  ([employer])  Moriah Cuellar    ROTATOR CUFF REPAIR Right 2015    TRANSESOPHAGEAL ECHOCARDIOGRAM  2017       Social History:    Social History     Tobacco Use    Smoking status: Former Smoker     Packs/day: 1.00     Years: 5.00     Pack years: 5.00     Types: Cigarettes     Quit date: 1972     Years since quittin.4    Smokeless tobacco: Never Used   Substance Use Topics    Alcohol use: Not Currently                                Counseling given: Not Answered      Vital Signs (Current):   Vitals:    22 1141   BP: 138/79   Pulse: (!) 102   Resp: 20   Temp: 97 °F (36.1 °C)   TempSrc: Temporal   SpO2: 97%                                              BP Readings from Last 3 Encounters:   22 138/79   22 126/68   22 138/86       NPO Status: Time of last liquid consumption: 1800                        Time of last solid consumption: 1800                        Date of last liquid consumption: 22                        Date of last solid food consumption: 22    BMI:   Wt Readings from Last 3 Encounters:   22 290 lb (131.5 kg)   22 292 lb 6.4 oz (132.6 kg)   22 290 lb (131.5 kg)     There is no height or weight on file to calculate BMI.    CBC:   Lab Results   Component Value Date    WBC 7.0 01/13/2022    RBC 4.56 01/13/2022    HGB 14.0 01/13/2022    HCT 42.5 01/13/2022    MCV 93.2 01/13/2022    RDW 13.8 01/13/2022     01/13/2022       CMP:   Lab Results   Component Value Date     05/26/2022    K 4.6 05/26/2022    K 3.9 12/07/2018     05/26/2022    CO2 22 05/26/2022    BUN 26 05/26/2022    CREATININE 1.5 05/26/2022    GFRAA 55 05/26/2022    LABGLOM 46 05/26/2022    GLUCOSE 220 05/26/2022    PROT 7.2 01/13/2022    CALCIUM 9.6 05/26/2022    BILITOT 0.6 01/13/2022    ALKPHOS 56 01/13/2022    AST 22 01/13/2022    ALT 17 01/13/2022       POC Tests: No results for input(s): POCGLU, POCNA, POCK, POCCL, POCBUN, POCHEMO, POCHCT in the last 72 hours.     Coags:   Lab Results   Component Value Date    PROTIME 15.7 12/05/2018    INR 1.4 12/05/2018    APTT 29.4 08/21/2016       HCG (If Applicable): No results found for: PREGTESTUR, PREGSERUM, HCG, HCGQUANT     ABGs: No results found for: PHART, PO2ART, BOC3NZR, PHI1TRT, BEART, W1FDWNZB     Type & Screen (If Applicable):  No results found for: LABABO, LABRH    Drug/Infectious Status (If Applicable):  No results found for: HIV, HEPCAB    COVID-19 Screening (If Applicable): No results found for: COVID19        Anesthesia Evaluation  Patient summary reviewed and Nursing notes reviewed no history of anesthetic complications:   Airway: Mallampati: III  TM distance: >3 FB   Neck ROM: full  Mouth opening: > = 3 FB   Dental: normal exam         Pulmonary:normal exam    (+) sleep apnea: on CPAP,      (-) not a current smoker                           Cardiovascular:  Exercise tolerance: good (>4 METS),   (+) pacemaker: pacemaker, dysrhythmias: atrial fibrillation, CHF:, hyperlipidemia        Rhythm: regular  Rate: normal           Beta Blocker:  Dose within 24 Hrs         Neuro/Psych:   Negative Neuro/Psych ROS GI/Hepatic/Renal:   (+) renal disease: CRI,          ROS comment: BPH. Endo/Other:    (+) DiabetesType II DM, , : arthritis:., .                 Abdominal:             Vascular: negative vascular ROS. Other Findings:           Anesthesia Plan      MAC     ASA 4       Induction: intravenous. Anesthetic plan and risks discussed with patient.                         Kaye Dukes MD   6/7/2022

## 2022-06-10 ENCOUNTER — TELEPHONE (OUTPATIENT)
Dept: NON INVASIVE DIAGNOSTICS | Age: 72
End: 2022-06-10

## 2022-06-10 NOTE — TELEPHONE ENCOUNTER
Pt called to change appt on 07/06/2022 with CK. MA attempted to call Pt back and left a message for him to call the office to reschedule.      Electronically signed by Carolynn Rubio MA on 6/10/2022 at 3:34 PM

## 2022-06-13 ENCOUNTER — TELEPHONE (OUTPATIENT)
Dept: NON INVASIVE DIAGNOSTICS | Age: 72
End: 2022-06-13

## 2022-06-13 RX ORDER — METOPROLOL SUCCINATE 25 MG/1
25 TABLET, EXTENDED RELEASE ORAL 2 TIMES DAILY
COMMUNITY
End: 2022-06-13 | Stop reason: SDUPTHER

## 2022-06-13 RX ORDER — METOPROLOL SUCCINATE 25 MG/1
25 TABLET, EXTENDED RELEASE ORAL 2 TIMES DAILY
Qty: 180 TABLET | Refills: 1 | Status: SHIPPED
Start: 2022-06-13 | End: 2022-06-23 | Stop reason: DRUGHIGH

## 2022-06-13 NOTE — TELEPHONE ENCOUNTER
I left a message for patient to return my call regarding today's remote [facility] transmission post cardioversion.     Juanis Best RN, BSN  [facility]

## 2022-06-13 NOTE — TELEPHONE ENCOUNTER
----- Message from Yehuda Montgomery MD sent at 6/13/2022  1:07 PM EDT -----  Please increase Toprol XL to 75 mg bid and remote in 1 week.  Thanks.  ----- Message -----  From: Denis Mckeon RN  Sent: 6/13/2022   1:01 PM EDT  To: Yehuda Montgomery MD    Murj report: Cardioversion 6/7/22    Tachycardia: AF  1  AT/AF [place]: 49.9% with 5 mode switches since 07-Jun-2022  Episodes started on 10-Miguel-2022, avg V rates 105 - 120 bpm  Most recent mode switch in progress since 11-Jun-2022 20:14  EGMs are c/w AF Vs, occasional , intermittent RVR, intermittent RA undersensing  Created By: Celso Quinteros 06/13/2022 12:30Edited By: Celso Quinteros 06/13/2022 12:35  Tachycardia: AF w/RVR  1  3 episodes binned as SVT detected since 10-Miguel-2022  Longest duration 01:49(M:S), average V rates 162 - 171 bpm  EGMs are c/w AF w/ RVR

## 2022-06-13 NOTE — TELEPHONE ENCOUNTER
Patient returned my call. He told me he is asymptomatic with the AF. He will be leaving Wednesday, 6/15/22. He will be out of state for about 3 weeks. He will take his remote Latitude monitor with him. I informed him Dr. Pinky Burns would like the Toprol XL dose to increase to 75 mg bid. His local pharmacy is [facility]. I told him we will get a remote Carelink transmission next Tuesday, 6/21/22. Patient said he needs to reschedule his 7/6/22 appointment with Dr. Pinky Burns. I will let the schedulers know. Patient voiced understanding of the above.     Leslye Trejo RN, BSN  [facility]

## 2022-06-23 ENCOUNTER — TELEPHONE (OUTPATIENT)
Dept: NON INVASIVE DIAGNOSTICS | Age: 72
End: 2022-06-23

## 2022-06-23 RX ORDER — METOPROLOL SUCCINATE 100 MG/1
100 TABLET, EXTENDED RELEASE ORAL 2 TIMES DAILY
COMMUNITY
End: 2022-06-23 | Stop reason: SDUPTHER

## 2022-06-23 RX ORDER — METOPROLOL SUCCINATE 100 MG/1
100 TABLET, EXTENDED RELEASE ORAL 2 TIMES DAILY
Qty: 180 TABLET | Refills: 1 | Status: SHIPPED
Start: 2022-06-23 | End: 2022-09-27 | Stop reason: SDUPTHER

## 2022-06-23 NOTE — TELEPHONE ENCOUNTER
----- Message from Alisha Gibbs MD sent at 6/23/2022  3:27 PM EDT -----  Increase Toprol XL to 100 mg bid. Thanks.  ----- Message -----  From: Kitty Guerrero RN  Sent: 6/23/2022   2:53 PM EDT  To: Alisha Gibbs MD    Murj report    Tachycardia: AF w/RVR  1  Stored EGMs are consistent with or suggestive of Atrial Fibrillation with Rapid Ventricular Response  AT [place]: 100%  Total number of events:11  Additional Notes:  Patient has been taking his Toprol XL 75 mg bid since 6/14/22. He is currently out of town for 3 weeks, but has is home monitor with him. He is asymptomatic with AF/RVR Ventricular rates 162-176.

## 2022-06-23 NOTE — TELEPHONE ENCOUNTER
I called Priyanka and informed him Dr. Eleni Caro would like him to increase his Toprol XL to 100 mg bid. Priyanka verbalized understanding.     Jarvis Moss RN, BSN  [facility]

## 2022-06-27 ENCOUNTER — TELEPHONE (OUTPATIENT)
Dept: NON INVASIVE DIAGNOSTICS | Age: 72
End: 2022-06-27

## 2022-06-27 NOTE — TELEPHONE ENCOUNTER
I returned Pat's call. He states he notices when he reaches the top of stairs he is getting short of breath and sometimes dizzy. He increased his Toprol XL to 100 mg bid on 6/24/22. He is presently in [place] on his vacation. I explained that the medication change can take a week or so to achieve the maximum benefit. I told him if he is developing symptoms he can send a remote Carelink transmission. I asked that he call the office if he sends a remote. I did recommend he seek emergent attention if he becomes more symptomatic instead of just climbing stairs. He said he will seek treatment if he needs to.      Juan Koch RN, BSN  [facility]

## 2022-07-18 ENCOUNTER — OFFICE VISIT (OUTPATIENT)
Dept: CARDIOLOGY CLINIC | Age: 72
End: 2022-07-18
Payer: MEDICARE

## 2022-07-18 VITALS
SYSTOLIC BLOOD PRESSURE: 122 MMHG | HEART RATE: 106 BPM | HEIGHT: 76 IN | WEIGHT: 277 LBS | DIASTOLIC BLOOD PRESSURE: 64 MMHG | BODY MASS INDEX: 33.73 KG/M2 | RESPIRATION RATE: 6 BRPM

## 2022-07-18 DIAGNOSIS — I49.5 SINUS NODE DYSFUNCTION (HCC): ICD-10-CM

## 2022-07-18 DIAGNOSIS — N18.31 TYPE 2 DIABETES MELLITUS WITH STAGE 3A CHRONIC KIDNEY DISEASE, WITH LONG-TERM CURRENT USE OF INSULIN (HCC): ICD-10-CM

## 2022-07-18 DIAGNOSIS — E78.2 MIXED HYPERLIPIDEMIA: ICD-10-CM

## 2022-07-18 DIAGNOSIS — G47.33 OBSTRUCTIVE SLEEP APNEA: ICD-10-CM

## 2022-07-18 DIAGNOSIS — E11.22 TYPE 2 DIABETES MELLITUS WITH STAGE 3A CHRONIC KIDNEY DISEASE, WITH LONG-TERM CURRENT USE OF INSULIN (HCC): ICD-10-CM

## 2022-07-18 DIAGNOSIS — E66.8 MODERATE OBESITY: ICD-10-CM

## 2022-07-18 DIAGNOSIS — I50.42 CHRONIC COMBINED SYSTOLIC AND DIASTOLIC CONGESTIVE HEART FAILURE (HCC): ICD-10-CM

## 2022-07-18 DIAGNOSIS — I48.0 PAROXYSMAL ATRIAL FIBRILLATION (HCC): Primary | ICD-10-CM

## 2022-07-18 DIAGNOSIS — I42.8 NONISCHEMIC CARDIOMYOPATHY (HCC): ICD-10-CM

## 2022-07-18 DIAGNOSIS — Z79.4 TYPE 2 DIABETES MELLITUS WITH STAGE 3A CHRONIC KIDNEY DISEASE, WITH LONG-TERM CURRENT USE OF INSULIN (HCC): ICD-10-CM

## 2022-07-18 PROCEDURE — 1123F ACP DISCUSS/DSCN MKR DOCD: CPT | Performed by: INTERNAL MEDICINE

## 2022-07-18 PROCEDURE — 99215 OFFICE O/P EST HI 40 MIN: CPT | Performed by: INTERNAL MEDICINE

## 2022-07-18 PROCEDURE — 3044F HG A1C LEVEL LT 7.0%: CPT | Performed by: INTERNAL MEDICINE

## 2022-07-18 PROCEDURE — 93000 ELECTROCARDIOGRAM COMPLETE: CPT | Performed by: INTERNAL MEDICINE

## 2022-07-18 RX ORDER — MULTIVITAMIN WITH IRON
50 TABLET ORAL DAILY
COMMUNITY
End: 2022-09-27

## 2022-07-18 RX ORDER — RIBOFLAVIN (VITAMIN B2) 100 MG
100 TABLET ORAL DAILY
COMMUNITY

## 2022-07-18 RX ORDER — LANOLIN ALCOHOL/MO/W.PET/CERES
400 CREAM (GRAM) TOPICAL DAILY
COMMUNITY
End: 2022-10-05 | Stop reason: ALTCHOICE

## 2022-07-18 NOTE — PROGRESS NOTES
OUTPATIENT CARDIOLOGY FOLLOW-UP    Name: Mary Canada    Age: 67 y.o. Primary Care Physician: Tu Hendrickson MD    Date of Service: 7/18/2022    Chief Complaint: Paroxysmal atrial fibrillation, sinus node dysfunction, nonischemic cardiomyopathy, chronic combined systolic and diastolic heart failure, moderate obesity, chronic kidney disease, obstructive sleep apnea    Interim History: Since his most recent evaluation, the patient underwent successful cardioversion restoring sinus rhythm, albeit transiently with  recurrent atrial arrhythmias returning within 1 week of cardioversion with present persistence with marginal rate control in spite of further modification of his beta-blocker dosage. In spite of this, he denies any active symptoms of anginal-like chest discomfort or other ischemic equivalents nor manifestations of decompensated left ventricular systolic dysfunction nor volume overload. He equally denies arrhythmia related symptomatology inclusive of palpitations and presently remains normotensive. He has remained compliant with the use of his nocturnal CPAP. Review of Systems: The remainder of a complete multisystem review including consitutional, central nervous, respiratory, circulatory, gastrointestinal, genitourinary, endocrinologic, hematologic, musculoskeletal and psychiatric are negative.     Past Medical History:  Past Medical History:   Diagnosis Date    Arthritis     Asbestos exposure     [facility] 07/2016    w/RVR    BPH (benign prostatic hyperplasia)     Cardiomyopathy (HCC)     CHF (congestive heart failure) (HCC)     CKD (chronic kidney disease)     Diabetes mellitus ([facility]     History of cardioversion 12/04/2018    admitted for [facility] therapy    Hyperlipidemia     Hypertension     Obesity     Sleep apnea     CPAP       Past Surgical History:  Past Surgical History:   Procedure Laterality Date    ATRIAL ABLATION SURGERY  11/06/2017    atrial fib (!) 6   Ht 6' 4\" (1.93 m)   Wt 277 lb (125.6 kg)   BMI 33.72 kg/m²   Wt Readings from Last 3 Encounters:   07/18/22 277 lb (125.6 kg)   06/03/22 290 lb (131.5 kg)   05/26/22 292 lb 6.4 oz (132.6 kg)     The patient is awake, alert and in no discomfort or distress. No gross musculoskeletal deformity is present. No significant skin or nail changes are present. Gross examination of head, eyes, nose and throat are negative. Jugular venous pressure is normal and no carotid bruits are present. Normal respiratory effort is noted with no accessory muscle usage present. Lung fields are clear to ascultation. Cardiac examination is notable for an irregular rhythm with no palpable thrill. No gallop rhythm or cardiac murmur are identified. A benign abdominal examination is present with no masses or organomegaly. Intact pulses are present throughout all extremities and no peripheral edema is present. No focal neurologic deficits are present. Laboratory Tests:  Lab Results   Component Value Date    CREATININE 1.5 (H) 05/26/2022    BUN 26 (H) 05/26/2022     05/26/2022    K 4.6 05/26/2022     05/26/2022    CO2 22 05/26/2022     No results found for: BNP  Lab Results   Component Value Date/Time    WBC 7.0 01/13/2022 10:13 AM    RBC 4.56 01/13/2022 10:13 AM    HGB 14.0 01/13/2022 10:13 AM    HCT 42.5 01/13/2022 10:13 AM    MCV 93.2 01/13/2022 10:13 AM    MCH 30.7 01/13/2022 10:13 AM    MCHC 32.9 01/13/2022 10:13 AM    RDW 13.8 01/13/2022 10:13 AM     01/13/2022 10:13 AM    MPV 10.6 01/13/2022 10:13 AM     No results for input(s): ALKPHOS, ALT, AST, PROT, BILITOT, BILIDIR, LABALBU in the last 72 hours.   Lab Results   Component Value Date/Time    MG 2.0 01/13/2022 10:13 AM     Lab Results   Component Value Date/Time    PROTIME 15.7 12/05/2018 08:06 PM    INR 1.4 12/05/2018 08:06 PM     Lab Results   Component Value Date/Time    TSH 1.750 01/28/2022 10:15 AM     No components found for: CHLPL  Lab Results Component Value Date    TRIG 93 01/28/2022    TRIG 189 (H) 06/24/2020    TRIG 91 01/15/2020     Lab Results   Component Value Date    HDL 37 01/28/2022    HDL 34 06/11/2021    HDL 41 01/26/2021     Lab Results   Component Value Date    LDLCALC 47 01/28/2022    LDLCALC 64 06/11/2021    LDLCALC 61 01/26/2021       Cardiac Tests:  ECG: A resting electrocardiogram demonstrates evidence of atrial fibrillation with a mean ventricular response of approximately 100 bpm with low voltage within the limb leads, left axis deviation, delayed precordial transition zone and nonspecific ST changes    ASSESSMENT / PLAN: On a clinical basis, the patient present remains compensated and spite of his recurrent and presently persistent atrial fibrillation with marginal rate control. I have presently maintained his existing medical regimen following the modification of his beta-blocker dosage by the electrophysiology service at least pending a repeat echocardiogram to reassess left ventricular systolic and right ventricular function to guide additional management with long-term management either related to modification of his dofetilide dosage or an alternative antiarrhythmic in addition to consideration of a repeat ablation deferred to the electrophysiology service. I have encouraged ongoing appropriate lifestyle modification to achieve additional weight reduction to benefit diastolic cardiac performance in addition to assisting management of his obstructive sleep apnea superimposed upon ongoing compliance with the use of nocturnal CPAP. Ongoing aggressive risk factor modification of blood pressure, diabetes and serum lipids will remain essential to reducing risk of future atherosclerotic development. Provide additional recommendations as appropriate following the completion review of his echocardiogram with planned cardiovascular reassessment in approximately 6 months.   I would happily reevaluate him in the interim should additional cardiovascular difficulties or concerns arise. The patient's current medication list, allergies, problem list and results of all previously ordered testing were reviewed at today's visit. Follow-up office visit in 6 months      Note: This report was completed using computerized voice recognition software. Every effort has been made to ensure accuracy, however; inadvertent computerized transcription errors may be present. Timothy Cameron.  Jaron Jacobs, [address] [place] [address]    An electronic copy of this follow-up progress note was forwarded to Dr. Kacey Mancini and Kyung Orlando

## 2022-07-22 ENCOUNTER — HOSPITAL ENCOUNTER (OUTPATIENT)
Age: 72
Discharge: HOME | End: 2022-07-22
Payer: MEDICARE

## 2022-07-22 LAB
ALBUMIN SERPL-MCNC: 4.4 G/DL (ref 3.5–5.2)
ALP BLD-CCNC: 47 U/L (ref 40–129)
ALT SERPL-CCNC: 16 U/L (ref 0–40)
ANION GAP SERPL CALCULATED.3IONS-SCNC: 10 MMOL/L (ref 7–16)
AST SERPL-CCNC: 17 U/L (ref 0–39)
BILIRUB SERPL-MCNC: 0.6 MG/DL (ref 0–1.2)
BUN BLDV-MCNC: 26 MG/DL (ref 6–23)
CALCIUM SERPL-MCNC: 9.6 MG/DL (ref 8.6–10.2)
CHLORIDE BLD-SCNC: 106 MMOL/L (ref 98–107)
CHOLESTEROL, TOTAL: 115 MG/DL (ref 0–199)
CO2: 25 MMOL/L (ref 22–29)
CREAT SERPL-MCNC: 1.5 MG/DL (ref 0.7–1.2)
GFR AFRICAN AMERICAN: 55
GFR NON-AFRICAN AMERICAN: 46 ML/MIN/1.73
GLUCOSE BLD-MCNC: 82 MG/DL (ref 74–99)
HDLC SERPL-MCNC: 30 MG/DL
LDL CHOLESTEROL CALCULATED: 60 MG/DL (ref 0–99)
POTASSIUM SERPL-SCNC: 4.2 MMOL/L (ref 3.5–5)
SODIUM BLD-SCNC: 141 MMOL/L (ref 132–146)
TOTAL PROTEIN: 7.4 G/DL (ref 6.4–8.3)
TRIGL SERPL-MCNC: 127 MG/DL (ref 0–149)
TSH SERPL DL<=0.05 MIU/L-ACNC: 2.43 UIU/ML (ref 0.27–4.2)
VLDLC SERPL CALC-MCNC: 25 MG/DL

## 2022-07-22 PROCEDURE — 82570 ASSAY OF URINE CREATININE: CPT

## 2022-07-22 PROCEDURE — 80053 COMPREHEN METABOLIC PANEL: CPT

## 2022-07-22 PROCEDURE — 84443 ASSAY THYROID STIM HORMONE: CPT

## 2022-07-22 PROCEDURE — 36415 COLL VENOUS BLD VENIPUNCTURE: CPT

## 2022-07-22 PROCEDURE — 80061 LIPID PANEL: CPT

## 2022-07-22 PROCEDURE — 82044 UR ALBUMIN SEMIQUANTITATIVE: CPT

## 2022-07-23 LAB
CREATININE URINE: 136 MG/DL (ref 40–278)
MICROALBUMIN UR-MCNC: 71.2 MG/L
MICROALBUMIN/CREAT UR-RTO: 52.4 (ref 0–30)

## 2022-08-15 ENCOUNTER — TELEPHONE (OUTPATIENT)
Dept: NON INVASIVE DIAGNOSTICS | Age: 72
End: 2022-08-15

## 2022-08-15 RX ORDER — DOFETILIDE 0.12 MG/1
CAPSULE ORAL
Qty: 180 CAPSULE | Refills: 1 | Status: SHIPPED | OUTPATIENT
Start: 2022-08-15

## 2022-08-15 NOTE — TELEPHONE ENCOUNTER
Tikosyn 125 mcg refill requested. On review of chart, patient has been in atrial fib since ~ 6/21/22. Please schedule for cardioversion soon. Refill sent.      DEIDRE Naqvi - CNP

## 2022-08-15 NOTE — TELEPHONE ENCOUNTER
Per Ralph Waggoner, patient needs a CV, prior to Paloma refill. I spoke to Jaki Ferrell and he is symptomatic with AF, but he is currently out of the state and will not be back until 8/30/22. I scheduled the CV w/ CK on 9/2/22. He confirms that he has not missed any doses of Xarelto in the past 4 weeks.

## 2022-09-02 ENCOUNTER — HOSPITAL ENCOUNTER (OUTPATIENT)
Dept: CARDIAC CATH/INVASIVE PROCEDURES | Age: 72
Discharge: HOME | End: 2022-09-02
Payer: MEDICARE

## 2022-09-02 ENCOUNTER — ANESTHESIA EVENT (OUTPATIENT)
Dept: CARDIAC CATH/INVASIVE PROCEDURES | Age: 72
End: 2022-09-02

## 2022-09-02 ENCOUNTER — ANESTHESIA (OUTPATIENT)
Dept: CARDIAC CATH/INVASIVE PROCEDURES | Age: 72
End: 2022-09-02

## 2022-09-02 VITALS
HEART RATE: 108 BPM | WEIGHT: 278 LBS | OXYGEN SATURATION: 98 % | TEMPERATURE: 96.7 F | BODY MASS INDEX: 33.85 KG/M2 | HEIGHT: 76 IN | DIASTOLIC BLOOD PRESSURE: 87 MMHG | RESPIRATION RATE: 20 BRPM | SYSTOLIC BLOOD PRESSURE: 143 MMHG

## 2022-09-02 PROBLEM — Z95.0 CARDIAC PACEMAKER IN SITU: Status: ACTIVE | Noted: 2022-09-02

## 2022-09-02 LAB
ANION GAP SERPL CALCULATED.3IONS-SCNC: 11 MMOL/L (ref 7–16)
BASOPHILS ABSOLUTE: 0.04 E9/L (ref 0–0.2)
BASOPHILS RELATIVE PERCENT: 0.6 % (ref 0–2)
BUN BLDV-MCNC: 22 MG/DL (ref 6–23)
CALCIUM SERPL-MCNC: 9.1 MG/DL (ref 8.6–10.2)
CHLORIDE BLD-SCNC: 112 MMOL/L (ref 98–107)
CO2: 23 MMOL/L (ref 22–29)
CREAT SERPL-MCNC: 1.3 MG/DL (ref 0.7–1.2)
EKG ATRIAL RATE: 133 BPM
EKG ATRIAL RATE: 76 BPM
EKG P-R INTERVAL: 258 MS
EKG Q-T INTERVAL: 380 MS
EKG Q-T INTERVAL: 420 MS
EKG QRS DURATION: 98 MS
EKG QRS DURATION: 98 MS
EKG QTC CALCULATION (BAZETT): 472 MS
EKG QTC CALCULATION (BAZETT): 504 MS
EKG R AXIS: -51 DEGREES
EKG R AXIS: -67 DEGREES
EKG T AXIS: -43 DEGREES
EKG T AXIS: 145 DEGREES
EKG VENTRICULAR RATE: 106 BPM
EKG VENTRICULAR RATE: 76 BPM
EOSINOPHILS ABSOLUTE: 0.17 E9/L (ref 0.05–0.5)
EOSINOPHILS RELATIVE PERCENT: 2.6 % (ref 0–6)
GFR AFRICAN AMERICAN: >60
GFR NON-AFRICAN AMERICAN: 54 ML/MIN/1.73
GLUCOSE BLD-MCNC: 112 MG/DL (ref 74–99)
HCT VFR BLD CALC: 42.1 % (ref 37–54)
HEMOGLOBIN: 13.6 G/DL (ref 12.5–16.5)
IMMATURE GRANULOCYTES #: 0.01 E9/L
IMMATURE GRANULOCYTES %: 0.2 % (ref 0–5)
LYMPHOCYTES ABSOLUTE: 1.16 E9/L (ref 1.5–4)
LYMPHOCYTES RELATIVE PERCENT: 18 % (ref 20–42)
MAGNESIUM: 2.2 MG/DL (ref 1.6–2.6)
MCH RBC QN AUTO: 28.5 PG (ref 26–35)
MCHC RBC AUTO-ENTMCNC: 32.3 % (ref 32–34.5)
MCV RBC AUTO: 88.1 FL (ref 80–99.9)
MONOCYTES ABSOLUTE: 0.56 E9/L (ref 0.1–0.95)
MONOCYTES RELATIVE PERCENT: 8.7 % (ref 2–12)
NEUTROPHILS ABSOLUTE: 4.51 E9/L (ref 1.8–7.3)
NEUTROPHILS RELATIVE PERCENT: 69.9 % (ref 43–80)
PDW BLD-RTO: 17 FL (ref 11.5–15)
PLATELET # BLD: 241 E9/L (ref 130–450)
PMV BLD AUTO: 11.7 FL (ref 7–12)
POTASSIUM SERPL-SCNC: 4.9 MMOL/L (ref 3.5–5)
RBC # BLD: 4.78 E12/L (ref 3.8–5.8)
SODIUM BLD-SCNC: 146 MMOL/L (ref 132–146)
WBC # BLD: 6.5 E9/L (ref 4.5–11.5)

## 2022-09-02 PROCEDURE — 83735 ASSAY OF MAGNESIUM: CPT

## 2022-09-02 PROCEDURE — 3700000000 HC ANESTHESIA ATTENDED CARE

## 2022-09-02 PROCEDURE — 6360000002 HC RX W HCPCS: Performed by: NURSE ANESTHETIST, CERTIFIED REGISTERED

## 2022-09-02 PROCEDURE — 92960 CARDIOVERSION ELECTRIC EXT: CPT

## 2022-09-02 PROCEDURE — 99220 PR INITIAL OBSERVATION CARE/DAY 70 MINUTES: CPT | Performed by: INTERNAL MEDICINE

## 2022-09-02 PROCEDURE — 85025 COMPLETE CBC W/AUTO DIFF WBC: CPT

## 2022-09-02 PROCEDURE — 92960 CARDIOVERSION ELECTRIC EXT: CPT | Performed by: INTERNAL MEDICINE

## 2022-09-02 PROCEDURE — 93288 INTERROG EVL PM/LDLS PM IP: CPT | Performed by: INTERNAL MEDICINE

## 2022-09-02 PROCEDURE — 2580000003 HC RX 258: Performed by: NURSE ANESTHETIST, CERTIFIED REGISTERED

## 2022-09-02 PROCEDURE — 3700000001 HC ADD 15 MINUTES (ANESTHESIA)

## 2022-09-02 PROCEDURE — 93005 ELECTROCARDIOGRAM TRACING: CPT | Performed by: INTERNAL MEDICINE

## 2022-09-02 PROCEDURE — 2709999900 HC NON-CHARGEABLE SUPPLY

## 2022-09-02 PROCEDURE — 80048 BASIC METABOLIC PNL TOTAL CA: CPT

## 2022-09-02 RX ORDER — SODIUM CHLORIDE 9 MG/ML
INJECTION, SOLUTION INTRAVENOUS CONTINUOUS PRN
Status: DISCONTINUED | OUTPATIENT
Start: 2022-09-02 | End: 2022-09-02 | Stop reason: SDUPTHER

## 2022-09-02 RX ORDER — SODIUM CHLORIDE 0.9 % (FLUSH) 0.9 %
5-40 SYRINGE (ML) INJECTION PRN
OUTPATIENT
Start: 2022-09-02

## 2022-09-02 RX ORDER — SODIUM CHLORIDE 9 MG/ML
INJECTION, SOLUTION INTRAVENOUS PRN
OUTPATIENT
Start: 2022-09-02

## 2022-09-02 RX ORDER — SODIUM CHLORIDE 0.9 % (FLUSH) 0.9 %
5-40 SYRINGE (ML) INJECTION EVERY 12 HOURS SCHEDULED
OUTPATIENT
Start: 2022-09-02

## 2022-09-02 RX ORDER — PROPOFOL 10 MG/ML
INJECTION, EMULSION INTRAVENOUS PRN
Status: DISCONTINUED | OUTPATIENT
Start: 2022-09-02 | End: 2022-09-02 | Stop reason: SDUPTHER

## 2022-09-02 RX ADMIN — PROPOFOL 70 MG: 10 INJECTION, EMULSION INTRAVENOUS at 10:59

## 2022-09-02 RX ADMIN — SODIUM CHLORIDE: 9 INJECTION, SOLUTION INTRAVENOUS at 10:49

## 2022-09-02 NOTE — ANESTHESIA POSTPROCEDURE EVALUATION
Department of Anesthesiology  Postprocedure Note    Patient: Gonzalez Ricardo  MRN: [id number]  YOB: 1950  Date of evaluation: 9/2/2022      Procedure Summary     Date: 09/02/22 [facility]    Anesthesia Start: 1054 Anesthesia Stop: 1109    Procedure: CARDIOVERSION WITH ANESTHESIA Diagnosis:       Paroxysmal atrial fibrillation      Other cardiomyopathies    Scheduled Providers:  Responsible Provider: Conor Ruff MD    Anesthesia Type: MAC ASA Status: 3          Anesthesia Type: No value filed.     Brandon Phase I:      Brandon Phase II:        Anesthesia Post Evaluation    Patient location during evaluation: bedside  Patient participation: complete - patient participated  Level of consciousness: awake  Pain score: 3  Airway patency: patent  Nausea & Vomiting: no nausea  Complications: no  Cardiovascular status: blood pressure returned to baseline  Respiratory status: acceptable  Hydration status: euvolemic

## 2022-09-02 NOTE — H&P
metoprolol succinate (TOPROL XL) 100 MG extended release tablet Take 1 tablet by mouth 2 times daily 180 tablet 1    tadalafil (CIALIS) 5 MG tablet Take 5 mg by mouth daily      Cholecalciferol (VITAMIN D3) 1.25 MG (45363 UT) TABS Take 1 capsule by mouth once a week      TESTOSTERONE IM Inject into the muscle Takes every 5 months   implant      furosemide (LASIX) 20 MG tablet TAKE 1 TABLET TWICE A DAY (Patient taking differently: Take 20 mg by mouth TAKE) 180 tablet 3    lisinopril (PRINIVIL;ZESTRIL) 5 MG tablet daily       NONFORMULARY CBD Gummies      magnesium oxide (MAG-OX) 400 (240 Mg) MG tablet Take 1 tablet by mouth daily 30 tablet 5    metFORMIN (GLUCOPHAGE) 1000 MG tablet Take 1 tablet by mouth 2 times daily (with meals) 60 tablet 3    linagliptin (TRADJENTA) 5 MG tablet Take 5 mg by mouth daily      Glucosamine 500 MG CAPS Take 2,000 mg by mouth daily       JARDIANCE 25 MG tablet Take 25 mg by mouth daily       glipiZIDE (GLUCOTROL) 10 MG tablet Take 10 mg by mouth 2 times daily (before meals)       LANTUS SOLOSTAR 100 UNIT/ML injection pen Inject 28 Units into the skin nightly      atorvastatin (LIPITOR) 40 MG tablet Take 40 mg by mouth daily       fenofibric acid (FIBRICOR) 135 MG CPDR capsule Take 135 mg by mouth daily       Multiple Vitamin (MULTI VITAMIN DAILY PO) Take by mouth daily      CPAP Machine MISC Inhale into the lungs nightly       tamsulosin (FLOMAX) 0.4 MG capsule Take 0.4 mg by mouth daily. No current facility-administered medications for this encounter. No Known Allergies    ROS:   Constitutional: Negative for fever, activity change and appetite change. HENT: Negative for epistaxis, difficulty swallowing. Eyes: Negative for blurred vision or double vision. Respiratory: Negative for cough, chest tightness, shortness of breath and wheezing. Cardiovascular: Negative for chest pain, and leg swelling.    Gastrointestinal: Negative for abdominal pain, heartburn, or blood in stool.   Genitourinary: Negative for hematuria, burning or frequency. Musculoskeletal: Negative for myalgias, stiffness, or swelling. Skin: Negative for rash, pain, or lumps. Neurological: Negative for syncope, seizures, or headaches. Psychiatric/Behavioral: Negative for confusion and agitation. The patient is not nervous/anxious. PHYSICAL EXAM:  Vitals:    09/02/22 1009   BP: (!) 143/87   Pulse: (!) 108   Resp: 20   Temp: (!) 96.7 °F (35.9 °C)   SpO2: 98%   Weight: 278 lb (126.1 kg)   Height: 6' 4\" (1.93 m)        Constitutional: Well-developed, no acute distress  Eyes: conjunctivae normal, no xanthelasma   Ears, Nose, Throat: oral mucosa moist, no cyanosis   CV: no JVD. IRIR. No murmurs, rubs, or gallops. PMI is nondisplaced  Lungs: clear to auscultation bilaterally, normal respiratory effort without used of accessory muscles  Abdomen: soft, non-tender, bowel sounds present, no masses or hepatomegaly   Musculoskeletal: no digital clubbing, no edema   Skin: warm, no rashes   Pacemaker site: well healed. No erosion, infection or migration    Pertinent Labs:  CBC:   Recent Labs     09/02/22  1000   WBC 6.5   HGB 13.6   HCT 42.1        BMP:  Recent Labs     09/02/22  1000      K 4.9   *   CO2 23   BUN 22   CREATININE 1.3*   CALCIUM 9.1     TSH:   Lab Results   Component Value Date    TSH 2.430 07/22/2022        Pertinent Cardiac Testing:     ECHO Complete 2D W Doppler W Color 3/22/18  Findings      Left Ventricle   Mildly dilated left ventricle. Mild left ventricular concentric hypertrophy noted. No regional wall motion abnormalities seen. Low normal left ventricular systolic function. Ejection fraction is visually estimated at 50-55%. Normal left ventricular diastolic filling pattern. Right Ventricle   Normal right ventricular size and function. Left Atrium   The left atrium is moderate-severely dilated. Interatrial septum appears intact.       Right Atrium   Normal right atrium size. Mitral Valve   Normal mitral valve structure and function. Tricuspid Valve   Normal tricuspid valve structure and function. Aortic Valve   Structurally normal aortic valve. Pulmonic Valve   The pulmonic valve was not well visualized. Mild pulmonic regurgitation present. Aorta   Aortic root dimension within normal limits. Conclusions      Summary   Mildly dilated left ventricle. Mild left ventricular concentric hypertrophy noted. No regional wall motion abnormalities seen. Low normal left ventricular systolic function. The left atrium is moderate-severely dilated. Signature      ----------------------------------------------------------------   Electronically signed by Tameka Schofield MD(Interpreting   physician) on 03/22/2018 05:21 PM   ----------------------------------------------------------------     M-Mode/2D Measurements & Calculations      LV Diastolic     LV Systolic Dimension: 4.2 cm   AV Cusp Separation: 2.1   Dimension: 5.8   LV Volume Diastolic: 445.5 ml   cmAO Root Dimension: 3.4   cm               LV Volume Systolic: 56.7 ml     cm   LV FS:27.6 %     LV EDV/LV EDV Index: 914.0   LV PW Diastolic: CARD/50 ZM/B^8ON ESV/LV ESV   1.3 cm           Index: 77.2 ml/29ml/ m^2   Septum           EF Calculated: 53.2 %           RV Diastolic Dimension:   Diastolic: 1.3   LV Mass Index: 125 l/min*m^2    3. 2 cm   cm                                                    LA/Aorta: 1.68   LV Mass: 331.4 g LVOT: 2.3 cm                                                    LA volume/Index: 133 ml                                                    RA Area: 18.8 cm^2     Doppler Measurements & Calculations      MV Peak E-Wave: 0.52 AV Peak Velocity: 1.34 LVOT Peak Velocity: 1.03 m/s   m/s                  m/s                    LVOT Mean Velocity: 0.61 m/s   MV Peak A-Wave: 0.8  AV Peak Gradient: 7.18 LVOT Peak Gradient: 4.3   m/s                  mmHg mmHgLVOT Mean Gradient: 1.8   MV E/A Ratio: 0.65   AV Mean Velocity: 0.82 mmHg   MV Peak Gradient:    m/s   1.8 mmHg             AV Mean Gradient: 3.4   MV Mean Gradient:    mmHg   0.6 mmHg             AV VTI: 24.3 cm   MV Mean Velocity:    AV Area   0.37 m/s             (Continuity):3.54 cm^2 PV Peak Velocity: 0.87 m/s   MV Deceleration                             PV Peak Gradient: 3.02 mmHg   Time: 295.4 msec     LVOT VTI: 20.7 cm      PV Mean Velocity: 0.53 m/s   MV P1/2t: 59.1 msec                         PV Mean Gradient: 1.4 mmHg   MVA by PHT:3.72 cm^2   MV Area   (continuity): 4.1   cm^2     ECG 1/6/22: SR, low voltage rate, old anterior infarct, LAFB,, 71 bpm, QTc: 403 ms - see scanned cardiology     Device Interrogation: 1/6/22   Make/Model [employer] [place] [employer]  Mode MVPR 60/120  Underlying rhythm: ApVs  Battery Voltage/Longevity: 10.7 years  Pacing: A: 75.8%  RV: 0.5%    P wave: 2.1 mV  Impedance: 437 ohms   Threshold: 0.75 V @ 0.4 ms  RV R wave: 6.8 mV  Impedance: 665 ohms   Threshold: 0.75 V @ 0.4 ms  Episodes: No AF since September 2021; brief SVTs. Reprogramming included: none  Overall device function is normal    All device programmable settings were evaluated per above and in the scanned document, along with iterative adjustments (capture thresholds) to assess and select the most appropriate final programming to provide for consistent delivery of the appropriate therapy and to verify function of the device. Impression:    1. Persistent atrial fibrillation  - Symptomatic.  - Diagnosed in 7/2016.  - [id number] of 4 on Xarelto for stroke risk reduction  - KEVIN/DC-CV and Tikosn initiation in 1/2017  - PVI + LA + RA rotor, SVC isolation, CTI flutter and LA roof linear ablation in 11/2017  - Dofetilide stopping in 6/2018 with recurrent persistent AF in 9/2018. - Tikosyn initiation in 12/2018. - Status post pacemaker insertion 12/6/18 due to SND.  - Noted to have recurrent AF since 6/11/22.   - I explained the risks & benefits of a CV to the patient. These include but are not limited to sedation, allergy, aspiration, respiratory distress/arrest, burning of skin, stroke and death. He verbalizes understanding and agrees to proceed with the procedure. - CrCl by IBW was 68.29 mL/min based of Cr of 1.2 on 6/11/2021  - Re-education on importance of well controlled HTN (goal BP < 130/80), adequate weight control (goal BMI of < 27), physical activity consisting of moderate cardiopulmonary exercise up to a goal of 250 min/wk, daily compliance with CPAP in treating sleep apnea, smoking cessation and limited ETOH intake. 2. Tachy-vaughn syndrome  - Sinus node dysfunction with sinus arrest >5 seconds and need for Beta-blocker and Dofetilide therapy for rapid AF  - Status post pacemaker insertion on 12/6/18     3. Pacemaker  in situ  - [employer]; dual chamber  - Indication: SND  - DOI 12/6/18.  - Normal device function. 4. Chronic systolic CHF  - Recovered LV EF.  - LV EF 30-35% on TTE 12/19/16.  - LV EF 40-45% on TTE 1/31/17.  - LV EF 40% on KEVIN 11/6/17.  - LV EF 50-55% on TTE 3/22/18.  - NYHA class II, ACC stage C.  - Euvolemic and compensated. - Continue GDMT including Toprol XL, Zestril and Lasix. 5. CARLO  - Continual daily compliance with CPAP. 6. Obesity  - Recommend weight loss  - Body mass index is 33.84 kg/m². 7. Diabetes mellitus  -On Lantus, Metformin, Tradjenta, Jardiance and Glucotrol     8. Hypertension  - Well controlled. - On Zestril, Toprol XL and Lasix. 9. Hyperlipidemia  - On Lipitor and Fenofibric acid    10. BPH  - On Flomax    11. Vitamin deficiency  - On Vitamins D supplement. 12. Supraventricular tachycardia  - Noted on today interrogation.  - Brief  - Asymptomatic.  - On Toprol XL and Tikosyn    13. Nonsustained ventricular tachycardia  - Noted per previous interrogation.  - Brief  - Asymptomatic.  - On Toprol XL    Recommendations:    1.  Will proceed with DC- cardioversion. 2. Follow after the procedure. Encouraged the patient to call if he has any questions or concerns. Thank you for allowing me to participate in their care.       Antwan Armendariz MD  Cardiac Electrophysiology  [address]   [facility] [place]  1/6/22   11:38 AM

## 2022-09-02 NOTE — PROCEDURES
[address]  [address] [facility]  [facility] Report  PATIENT: Santhosh Willoughby  MEDICAL RECORD NUMBER: [id number]  DATE OF PROCEDURE:  9/2/2022  ATTENDING ELECTROPHYSIOLOGIST:  Caio Mancilla MD  REFERRING PHYSICIAN: Dr. Joe Lancaster:    1. Direct Current Electrical Cardioversion  2. [place] procedure device interrogation    INDICATION:  Persistent atrial fibrillation  Pacemaker in situ    PROCEDURE PERFORMED By: Caio Mancilla MD    PROCEDURE TIME: 30 minute    COMPICATIONS: None immediately apparent    ANESTHESIA: LMAC    DESCRIPTION OF PROCEDURE: The risks, benefits, and alternatives to the procedure were discussed with the patient, and informed consent was obtained. The patient was brought to the cardiovascular lab and sedated under the guidance of anesthesia. Once anesthesia was deemed adequate, a 200 J biphasic synchronous shock was applied successfully restored normal sinus rhythm. The patient was then allowed to wake in the usual fashion. Pacemaker was interrogated and reprogrammed before and after the procedure. Device Interrogation:   Underlying rhythm: AF Vs  Mode: AAIR <-> DDDR   Battery Voltage/Longevity:  10.3 years   Pacing: A: 3.3%  RV: 15.1%    P wave: 5.1 mV  Impedance: 399 ohms   Threshold: 0.5 V @ 0.4 ms  RV R wave: 6.3 mV  Impedance: 494 ohms   Threshold: 0.5 V @ 0.4 ms  Episodes: 100%AF since June 2022  Reprogramming included: Increased base rate to 70 bpm and turned on atrial therapy  Overall device function is normal    All device programmable settings were evaluated per above and in the scanned document, along with iterative adjustments (capture thresholds) to assess and select the most appropriate final programming to provide for consistent delivery of the appropriate therapy and to verify function of the device.       Comment: The patient was therapeutically anticoagulated for a minimum of 3 consecutive weeks prior to cardioversion. SUMMARY:  1. Successful cardioversion of persistent atrial fibrillation to sinus rhythm. 2. Normal pacemaker function    RECOMMENDATIONS:  1. Discharge to home when fully awake and alert, if clinically stable. 2. Resume all pre-procedure medications, including anticoagulation. 3. Follow-up in 1 week for remote interrogation and in 1 month with provider.      Catina Munoz MD  Cardiac Electrophysiology  [address]  [facility] [place]: [facility]  11:08 AM  9/2/2022

## 2022-09-02 NOTE — ANESTHESIA PRE PROCEDURE
Department of Anesthesiology  Preprocedure Note       Name:  Blayne De Los Santos   Age:  67 y.o.  :  1950                                          MRN:  [id number]         Date:  2022      Surgeon: * Surgery not found *    Procedure:     Medications prior to admission:   Prior to Admission medications    Medication Sig Start Date End Date Taking? Authorizing Provider   dofetilide (TIKOSYN) 125 MCG capsule TAKE 1 CAPSULE EVERY 12 HOURS 8/15/22   Berry Verma APRN - CNP   rivaroxaban (XARELTO) 20 MG TABS tablet TAKE 1 TABLET DAILY 22   Tatiana Caruso MD   Ascorbic Acid (VITAMIN C) 100 MG tablet Take 100 mg by mouth in the morning. Historical Provider, MD   vitamin B-12 (CYANOCOBALAMIN) 50 MCG tablet Take 50 mcg by mouth in the morning. Historical Provider, MD   folic acid (FOLVITE) 160 MCG tablet Take 400 mcg by mouth in the morning.     Historical Provider, MD   metoprolol succinate (TOPROL XL) 100 MG extended release tablet Take 1 tablet by mouth 2 times daily 22  Sean Ewing MD   tadalafil (CIALIS) 5 MG tablet Take 5 mg by mouth daily    Historical Provider, MD   Cholecalciferol (VITAMIN D3) 1.25 MG (33778 UT) TABS Take 1 capsule by mouth once a week    Historical Provider, MD   TESTOSTERONE IM Inject into the muscle Takes every 5 months   implant    Historical Provider, MD   furosemide (LASIX) 20 MG tablet TAKE 1 TABLET TWICE A DAY  Patient taking differently: Take 20 mg by mouth TAKE 21   Tatiana Caruso MD   lisinopril (PRINIVIL;ZESTRIL) 5 MG tablet daily  20   Historical Provider, MD   NONFORMULARY CBD Gummies    Historical Provider, MD   magnesium oxide (MAG-OX) 400 (240 Mg) MG tablet Take 1 tablet by mouth daily 18   Juan Patterson MD   metFORMIN (GLUCOPHAGE) 1000 MG tablet Take 1 tablet by mouth 2 times daily (with meals) 18   Juan Patterson MD   linagliptin (TRADJENTA) 5 MG tablet Take 5 mg by mouth daily    Historical Provider, MD Glucosamine 500 MG CAPS Take 2,000 mg by mouth daily     Historical Provider, MD   JARDIANCE 25 MG tablet Take 25 mg by mouth daily  3/5/18   Historical Provider, MD   glipiZIDE (GLUCOTROL) 10 MG tablet Take 10 mg by mouth 2 times daily (before meals)  3/5/18   Historical Provider, MD   LANTUS SOLOSTAR 100 UNIT/ML injection pen Inject 28 Units into the skin nightly 3/5/18   Historical Provider, MD   atorvastatin (LIPITOR) 40 MG tablet Take 40 mg by mouth daily  7/6/17   Historical Provider, MD   fenofibric acid (FIBRICOR) 135 MG CPDR capsule Take 135 mg by mouth daily  4/7/17   Historical Provider, MD   Multiple Vitamin (MULTI VITAMIN DAILY PO) Take by mouth daily    Historical Provider, MD   CPAP Machine MISC Inhale into the lungs nightly     Historical Provider, MD   tamsulosin (FLOMAX) 0.4 MG capsule Take 0.4 mg by mouth daily. Historical Provider, MD       Current medications:    Current Outpatient Medications   Medication Sig Dispense Refill    dofetilide (TIKOSYN) 125 MCG capsule TAKE 1 CAPSULE EVERY 12 HOURS 180 capsule 1    rivaroxaban (XARELTO) 20 MG TABS tablet TAKE 1 TABLET DAILY 90 tablet 3    Ascorbic Acid (VITAMIN C) 100 MG tablet Take 100 mg by mouth in the morning.  vitamin B-12 (CYANOCOBALAMIN) 50 MCG tablet Take 50 mcg by mouth in the morning.  folic acid (FOLVITE) 300 MCG tablet Take 400 mcg by mouth in the morning.       metoprolol succinate (TOPROL XL) 100 MG extended release tablet Take 1 tablet by mouth 2 times daily 180 tablet 1    tadalafil (CIALIS) 5 MG tablet Take 5 mg by mouth daily      Cholecalciferol (VITAMIN D3) 1.25 MG (30913 UT) TABS Take 1 capsule by mouth once a week      TESTOSTERONE IM Inject into the muscle Takes every 5 months   implant      furosemide (LASIX) 20 MG tablet TAKE 1 TABLET TWICE A DAY (Patient taking differently: Take 20 mg by mouth TAKE) 180 tablet 3    lisinopril (PRINIVIL;ZESTRIL) 5 MG tablet daily       NONFORMULARY CBD Gummies      magnesium oxide (MAG-OX) 400 (240 Mg) MG tablet Take 1 tablet by mouth daily 30 tablet 5    metFORMIN (GLUCOPHAGE) 1000 MG tablet Take 1 tablet by mouth 2 times daily (with meals) 60 tablet 3    linagliptin (TRADJENTA) 5 MG tablet Take 5 mg by mouth daily      Glucosamine 500 MG CAPS Take 2,000 mg by mouth daily       JARDIANCE 25 MG tablet Take 25 mg by mouth daily       glipiZIDE (GLUCOTROL) 10 MG tablet Take 10 mg by mouth 2 times daily (before meals)       LANTUS SOLOSTAR 100 UNIT/ML injection pen Inject 28 Units into the skin nightly      atorvastatin (LIPITOR) 40 MG tablet Take 40 mg by mouth daily       fenofibric acid (FIBRICOR) 135 MG CPDR capsule Take 135 mg by mouth daily       Multiple Vitamin (MULTI VITAMIN DAILY PO) Take by mouth daily      CPAP Machine MISC Inhale into the lungs nightly       tamsulosin (FLOMAX) 0.4 MG capsule Take 0.4 mg by mouth daily. No current facility-administered medications for this encounter.        Allergies:  No Known Allergies    Problem List:    Patient Active Problem List   Diagnosis Code    Persistent atrial fibrillation (HCC) I48.19    Type 2 diabetes mellitus with stage 3a chronic kidney disease, with long-term current use of insulin (HCC) E11.22, N18.31, Z79.4    Moderate obesity E66.8    LV dysfunction I51.9    Nonischemic cardiomyopathy (HCC) I42.8    Chronic combined systolic and diastolic congestive heart failure (HCC) I50.42    Obstructive sleep apnea G47.33    Mixed hyperlipidemia E78.2    Typical atrial flutter (HCC) I48.3    Paroxysmal atrial fibrillation (HCC) I48.0    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    Sinus node dysfunction (HCC) I49.5    Status post catheter ablation of atrial fibrillation Z98.890       Past Medical History:        Diagnosis Date    Arthritis     Asbestos exposure     [facility]2016    w/RVR    BPH (benign prostatic hyperplasia)     Cardiomyopathy [facility]     [facility]     CKD (chronic kidney disease)     Diabetes mellitus ([facility]     History of cardioversion 2018    admitted for Trellis Drafts therapy    Hyperlipidemia     Hypertension     Obesity     Sleep apnea     CPAP       Past Surgical History:        Procedure Laterality Date    ATRIAL ABLATION SURGERY  2017    atrial fib    CARDIOVERSION  2016    CARDIOVERSION  11/15/2016    DCCV    CARDIOVERSION  2017    Dr. Jean Rossi  2017    NASAL [address]    PACEMAKER INSERTION  2018    D-PPM  ([employer])  Mohan Gip    ROTATOR CUFF REPAIR Right 2015    TRANSESOPHAGEAL ECHOCARDIOGRAM  2017       Social History:    Social History     Tobacco Use    Smoking status: Former     Packs/day: 1.00     Years: 5.00     Pack years: 5.00     Types: Cigarettes     Quit date: 1972     Years since quittin.7    Smokeless tobacco: Never   Substance Use Topics    Alcohol use: Not Currently                                Counseling given: Not Answered      Vital Signs (Current): There were no vitals filed for this visit.                                            BP Readings from Last 3 Encounters:   22 122/64   22 114/77   22 126/68       NPO Status:                                                                                 BMI:   Wt Readings from Last 3 Encounters:   22 277 lb (125.6 kg)   22 290 lb (131.5 kg)   22 292 lb 6.4 oz (132.6 kg)     There is no height or weight on file to calculate BMI.    CBC:   Lab Results   Component Value Date/Time    WBC 7.0 2022 10:13 AM    RBC 4.56 2022 10:13 AM    HGB 14.0 2022 10:13 AM    HCT 42.5 2022 10:13 AM    MCV 93.2 2022 10:13 AM    RDW 13.8 2022 10:13 AM     2022 10:13 AM       CMP:   Lab Results   Component Value Date/Time     2022 10:09 AM    K 4.2 2022 10:09 AM    K 3.9 2018 05:53 AM     07/22/2022 10:09 AM    CO2 25 07/22/2022 10:09 AM    BUN 26 07/22/2022 10:09 AM    CREATININE 1.5 07/22/2022 10:09 AM    GFRAA 55 07/22/2022 10:09 AM    LABGLOM 46 07/22/2022 10:09 AM    GLUCOSE 82 07/22/2022 10:09 AM    PROT 7.4 07/22/2022 10:09 AM    CALCIUM 9.6 07/22/2022 10:09 AM    BILITOT 0.6 07/22/2022 10:09 AM    ALKPHOS 47 07/22/2022 10:09 AM    AST 17 07/22/2022 10:09 AM    ALT 16 07/22/2022 10:09 AM       POC Tests: No results for input(s): POCGLU, POCNA, POCK, POCCL, POCBUN, POCHEMO, POCHCT in the last 72 hours. Coags:   Lab Results   Component Value Date/Time    PROTIME 15.7 12/05/2018 08:06 PM    INR 1.4 12/05/2018 08:06 PM    APTT 29.4 08/21/2016 10:38 AM       HCG (If Applicable): No results found for: PREGTESTUR, PREGSERUM, HCG, HCGQUANT     ABGs: No results found for: PHART, PO2ART, EIQ3AJF, SFE3ZIH, BEART, K5XNAECN     Type & Screen (If Applicable):  No results found for: LABABO, LABRH    Drug/Infectious Status (If Applicable):  No results found for: HIV, HEPCAB    COVID-19 Screening (If Applicable): No results found for: COVID19        Anesthesia Evaluation  Patient summary reviewed  Airway: Mallampati: III  TM distance: >3 FB   Neck ROM: full  Mouth opening: > = 3 FB   Dental: normal exam         Pulmonary: breath sounds clear to auscultation  (+) sleep apnea: on CPAP,                             Cardiovascular:    (+) hypertension:, pacemaker:, dysrhythmias: atrial fibrillation, CHF:,         Rhythm: irregular  Rate: normal           Beta Blocker:  Dose within 24 Hrs         Neuro/Psych:   Negative Neuro/Psych ROS              GI/Hepatic/Renal:   (+) renal disease:,           Endo/Other:    (+) DiabetesType II DM, , blood dyscrasia: anticoagulation therapy:., .                 Abdominal:   (+) obese,     Abdomen: soft. Vascular: Other Findings:           Anesthesia Plan      MAC     ASA 3             Anesthetic plan and risks discussed with patient.       Plan discussed with CRNA.                    Pat Nettles MD   9/2/2022

## 2022-09-06 ENCOUNTER — TELEPHONE (OUTPATIENT)
Dept: NON INVASIVE DIAGNOSTICS | Age: 72
End: 2022-09-06

## 2022-09-06 NOTE — TELEPHONE ENCOUNTER
Scheduled appointment w/ NP 09/27/2022 @ 8 AM with NP. The patient will be out of town from 10/05/2022-11/05/2022. The patient will send remote this Friday.

## 2022-09-08 ENCOUNTER — TELEPHONE (OUTPATIENT)
Dept: CARDIOLOGY | Age: 72
End: 2022-09-08

## 2022-09-08 NOTE — TELEPHONE ENCOUNTER
Called patient with time/date of scheduled Echo. I had to l/m. Asked that pt call me back to confirm.   Electronically signed by Chava Matos on 9/8/2022 at 3:20 PM

## 2022-09-09 ENCOUNTER — TELEPHONE (OUTPATIENT)
Dept: NON INVASIVE DIAGNOSTICS | Age: 72
End: 2022-09-09

## 2022-09-09 NOTE — TELEPHONE ENCOUNTER
Called patient to inform him that his device transmission was received and he is currently in sinus rhythm.

## 2022-09-13 ENCOUNTER — TELEPHONE (OUTPATIENT)
Dept: NON INVASIVE DIAGNOSTICS | Age: 72
End: 2022-09-13

## 2022-09-13 NOTE — TELEPHONE ENCOUNTER
I called Johnna Tucker and went over Dr. Perla Quinones options: admit to increase Tikosyn and repeat DC cardioversion; switch to Amiodarone; or rate control treatment only. Repeat remote [facility] in one week. Garrison Jacome states he feels okay, no shortness of breath. He told me he has a cruise planned for two weeks in October. He does not wish to be admitted at this time. He said he will opt to check his remote in one week and go from there. I scheduled a remote transmission in [facility] on 9/20/22.     Reagan Mesa RN, BSN  [facility]

## 2022-09-13 NOTE — TELEPHONE ENCOUNTER
----- Message from Jadiel Lemon MD sent at 9/13/2022 12:11 PM EDT -----  Option are admit to  increase Tikosyn dose and repeat DC-cardioversion, switch to Amiodarone or rate control treatment only. Repeat remote in 1 week. Thanks.  ----- Message -----  From: Radu Jon RN  Sent: 9/13/2022  12:04 PM EDT  To: Jadiel Lemon MD    Murj report    Cardioversion on 9/2/22    Tachycardia: AF  1  Pacemaker remote device check for AF Detection s/p DCCV 9/2/2022    Implant Indication: SND, hx AF/AFL  Per medication list, pt taking Tikosyn, Metoprolol S, Mag-Ox, Xarelto  Presenting rhythm: AF /Vs  RVp 1.2%  AT/AF [place]: 15.3% since 9/9/2022  Current episode in progress since 12-Sep-2022 09:45, avg V rate ~ 110 bpm  EGM is c/w AF w/ RVR, intermittent RA undersensing  Atrial ATP sequences delivered- unsuccessful  RA sensitivity programmed 0.3mV  Additional Notes: The patient states he does not have any symptoms.  The only way he knew he was om AF was because of his Apple watch    Created By: Marco Funez 09/13/2022 11:05Edited By: Vidal Dan 09/13/2022 12:01  Tachycardia: AF w/RVR  1  4 HVR episodes binned as VT-NS (3) or Fast A&V (1) detected since 9/9/2022  Longest duration ~25s, avg V rate 164 - 182 bpm  EGMs are /w PAT & AF w/ RVR  Created ByCrispin Skiff 09/13/2022 11:15    Follow up in office on 9/27/22

## 2022-09-20 ENCOUNTER — TELEPHONE (OUTPATIENT)
Dept: NON INVASIVE DIAGNOSTICS | Age: 72
End: 2022-09-20

## 2022-09-23 ENCOUNTER — HOSPITAL ENCOUNTER (OUTPATIENT)
Dept: CARDIOLOGY | Age: 72
Discharge: HOME | End: 2022-09-23
Payer: MEDICARE

## 2022-09-23 DIAGNOSIS — I42.8 NONISCHEMIC CARDIOMYOPATHY (HCC): ICD-10-CM

## 2022-09-23 DIAGNOSIS — I48.0 PAROXYSMAL ATRIAL FIBRILLATION (HCC): ICD-10-CM

## 2022-09-23 LAB
LV EF: 33 %
LVEF MODALITY: NORMAL

## 2022-09-23 PROCEDURE — 6360000004 HC RX CONTRAST MEDICATION: Performed by: INTERNAL MEDICINE

## 2022-09-23 PROCEDURE — C8929 TTE W OR WO FOL WCON,DOPPLER: HCPCS

## 2022-09-23 PROCEDURE — 2580000003 HC RX 258: Performed by: INTERNAL MEDICINE

## 2022-09-23 RX ORDER — SODIUM CHLORIDE 0.9 % (FLUSH) 0.9 %
10 SYRINGE (ML) INJECTION PRN
Status: DISCONTINUED | OUTPATIENT
Start: 2022-09-23 | End: 2022-09-24 | Stop reason: HOSPADM

## 2022-09-23 RX ADMIN — SODIUM CHLORIDE, PRESERVATIVE FREE 10 ML: 5 INJECTION INTRAVENOUS at 11:31

## 2022-09-23 RX ADMIN — PERFLUTREN 1.1 MG: 6.52 INJECTION, SUSPENSION INTRAVENOUS at 11:31

## 2022-09-23 RX ADMIN — SODIUM CHLORIDE, PRESERVATIVE FREE 10 ML: 5 INJECTION INTRAVENOUS at 11:37

## 2022-09-26 RX ORDER — SACUBITRIL AND VALSARTAN 24; 26 MG/1; MG/1
1 TABLET, FILM COATED ORAL 2 TIMES DAILY
COMMUNITY
End: 2022-09-26 | Stop reason: SDUPTHER

## 2022-09-26 RX ORDER — SACUBITRIL AND VALSARTAN 24; 26 MG/1; MG/1
1 TABLET, FILM COATED ORAL 2 TIMES DAILY
Qty: 180 TABLET | Refills: 3 | Status: SHIPPED | OUTPATIENT
Start: 2022-09-26

## 2022-09-27 ENCOUNTER — OFFICE VISIT (OUTPATIENT)
Dept: NON INVASIVE DIAGNOSTICS | Age: 72
End: 2022-09-27
Payer: MEDICARE

## 2022-09-27 ENCOUNTER — TELEPHONE (OUTPATIENT)
Dept: CARDIOLOGY CLINIC | Age: 72
End: 2022-09-27

## 2022-09-27 VITALS
HEART RATE: 116 BPM | BODY MASS INDEX: 33.85 KG/M2 | HEIGHT: 76 IN | WEIGHT: 278 LBS | DIASTOLIC BLOOD PRESSURE: 78 MMHG | SYSTOLIC BLOOD PRESSURE: 140 MMHG

## 2022-09-27 DIAGNOSIS — I50.42 CHRONIC COMBINED SYSTOLIC AND DIASTOLIC CONGESTIVE HEART FAILURE (HCC): ICD-10-CM

## 2022-09-27 DIAGNOSIS — Z95.0 PACEMAKER: ICD-10-CM

## 2022-09-27 DIAGNOSIS — Z51.81 VISIT FOR MONITORING TIKOSYN THERAPY: ICD-10-CM

## 2022-09-27 DIAGNOSIS — Z79.899 VISIT FOR MONITORING TIKOSYN THERAPY: ICD-10-CM

## 2022-09-27 DIAGNOSIS — Z79.01 ANTICOAGULATED: ICD-10-CM

## 2022-09-27 DIAGNOSIS — I42.8 NONISCHEMIC CARDIOMYOPATHY (HCC): ICD-10-CM

## 2022-09-27 DIAGNOSIS — E11.22 TYPE 2 DIABETES MELLITUS WITH STAGE 3A CHRONIC KIDNEY DISEASE, WITH LONG-TERM CURRENT USE OF INSULIN (HCC): ICD-10-CM

## 2022-09-27 DIAGNOSIS — G47.33 OBSTRUCTIVE SLEEP APNEA: ICD-10-CM

## 2022-09-27 DIAGNOSIS — Z79.4 TYPE 2 DIABETES MELLITUS WITH STAGE 3A CHRONIC KIDNEY DISEASE, WITH LONG-TERM CURRENT USE OF INSULIN (HCC): ICD-10-CM

## 2022-09-27 DIAGNOSIS — N18.31 TYPE 2 DIABETES MELLITUS WITH STAGE 3A CHRONIC KIDNEY DISEASE, WITH LONG-TERM CURRENT USE OF INSULIN (HCC): ICD-10-CM

## 2022-09-27 DIAGNOSIS — E66.8 MODERATE OBESITY: ICD-10-CM

## 2022-09-27 DIAGNOSIS — I48.19 PERSISTENT ATRIAL FIBRILLATION (HCC): Primary | ICD-10-CM

## 2022-09-27 PROCEDURE — 1123F ACP DISCUSS/DSCN MKR DOCD: CPT | Performed by: NURSE PRACTITIONER

## 2022-09-27 PROCEDURE — 3044F HG A1C LEVEL LT 7.0%: CPT | Performed by: NURSE PRACTITIONER

## 2022-09-27 PROCEDURE — 99215 OFFICE O/P EST HI 40 MIN: CPT | Performed by: NURSE PRACTITIONER

## 2022-09-27 RX ORDER — METOPROLOL SUCCINATE 50 MG/1
150 TABLET, EXTENDED RELEASE ORAL SEE ADMIN INSTRUCTIONS
Qty: 180 TABLET | Refills: 1 | Status: SHIPPED | OUTPATIENT
Start: 2022-09-27 | End: 2023-03-26

## 2022-09-27 NOTE — PROGRESS NOTES
[address] [facility] Electrophysiology  Outpatient Progress Note  Odette Roberts  1950  Date of Service: 9/27/2022  PCP: Ladonna Martinez MD  Cardiologist: Dr Edu Alvares   Electrophysiologist:  Dr Darlene Wright         Subjective: Odette Roberts is seen for follow-up and management of:   Atrial fibrillation and pacemaker    Last seen in the office with Dr Darlene Wright on 4/17/2021. Recent cardioversion on 9/2/2022. PMH as noted below significant for patient has past medical history of tachybradycardia syndrome, persistent atrial fibrillation on Tikosyn, status post PVI and CTI ablation in 2017, pacemaker, systolic heart failure and sleep apnea. Patient had a cardioversion on 6/7/2022 with Dr. Edu Alvares, with recurrent atrial fibrillation on 6/11/2022. His metoprolol was increased at this time. On review of chart, patient was noted to be in continued atrial fibrillation and had repeat cardioversion on 9/2/2022. He had recurrent atrial fibrillation on and off since his cardioversion and has had an ongoing episode for the last 4 days. He does have some elevated heart rates when in atrial fibrillation. He states that he has not felt poorly in the past few days and has actually felt better since his cardioversion. He denies any palpitations, increased edema or shortness of breath with exertion. He would like to start exercising again and has been active during the day. He has recently seen cardiology and ejection fraction was noted to be low with changes in medication. Lisinopril was stopped and he is planning to start [place] Jacek tomorrow.     Patient Active Problem List   Diagnosis    Persistent atrial fibrillation (HCC)    Type 2 diabetes mellitus with stage 3a chronic kidney disease, with long-term current use of insulin (HCC)    Moderate obesity    LV dysfunction    Nonischemic cardiomyopathy (HCC)    Chronic combined systolic and diastolic congestive heart failure (HCC)    Obstructive sleep apnea    Mixed hyperlipidemia    Typical atrial flutter (HCC)    Paroxysmal atrial fibrillation (HCC)    Tachy-vaughn syndrome (HCC)    Sinus node dysfunction (HCC)    Status post catheter ablation of atrial fibrillation    Cardiac pacemaker in situ       Current Outpatient Medications   Medication Sig Dispense Refill    dofetilide (TIKOSYN) 125 MCG capsule TAKE 1 CAPSULE EVERY 12 HOURS 180 capsule 1    rivaroxaban (XARELTO) 20 MG TABS tablet TAKE 1 TABLET DAILY 90 tablet 3    Ascorbic Acid (VITAMIN C) 100 MG tablet Take 100 mg by mouth in the morning. folic acid (FOLVITE) 635 MCG tablet Take 400 mcg by mouth in the morning.       metoprolol succinate (TOPROL XL) 100 MG extended release tablet Take 1 tablet by mouth 2 times daily 180 tablet 1    tadalafil (CIALIS) 5 MG tablet Take 5 mg by mouth daily      Cholecalciferol (VITAMIN D3) 1.25 MG (53989 UT) TABS Take 1 capsule by mouth once a week      furosemide (LASIX) 20 MG tablet TAKE 1 TABLET TWICE A DAY (Patient taking differently: Take 20 mg by mouth TAKE) 180 tablet 3    magnesium oxide (MAG-OX) 400 (240 Mg) MG tablet Take 1 tablet by mouth daily 30 tablet 5    metFORMIN (GLUCOPHAGE) 1000 MG tablet Take 1 tablet by mouth 2 times daily (with meals) 60 tablet 3    linagliptin (TRADJENTA) 5 MG tablet Take 5 mg by mouth daily      Glucosamine 500 MG CAPS Take 2,000 mg by mouth daily       JARDIANCE 25 MG tablet Take 25 mg by mouth daily       glipiZIDE (GLUCOTROL) 10 MG tablet Take 10 mg by mouth 2 times daily (before meals)       LANTUS SOLOSTAR 100 UNIT/ML injection pen Inject 28 Units into the skin nightly      atorvastatin (LIPITOR) 40 MG tablet Take 40 mg by mouth daily       fenofibric acid (FIBRICOR) 135 MG CPDR capsule Take 135 mg by mouth daily       Multiple Vitamin (MULTI VITAMIN DAILY PO) Take by mouth daily      CPAP Machine MISC Inhale into the lungs nightly       tamsulosin (FLOMAX) 0.4 MG capsule Take 0.4 mg by mouth daily. sacubitril-valsartan (ENTRESTO) 24-26 MG per tablet Take 1 tablet by mouth 2 times daily (Patient not taking: Reported on 9/27/2022) 180 tablet 3    vitamin B-12 (CYANOCOBALAMIN) 50 MCG tablet Take 50 mcg by mouth in the morning. (Patient not taking: Reported on 9/27/2022)      TESTOSTERONE IM Inject into the muscle Takes every 5 months   implant (Patient not taking: Reported on 9/27/2022)      lisinopril (PRINIVIL;ZESTRIL) 5 MG tablet daily  (Patient not taking: Reported on 9/27/2022)      NONFORMULARY CBD Gummies (Patient not taking: Reported on 9/27/2022)       No current facility-administered medications for this visit. No Known Allergies    ROS:   Constitutional: Negative for fever, activity change and appetite change. HENT: Negative for epistaxis. Eyes: Negative for diploplia, blurred vision. Respiratory: Negative for cough, chest tightness, shortness of breath and wheezing. Cardiovascular: pertinent positives in HPI  Gastrointestinal: Negative for abdominal pain and blood in stool. All other review of systems are negative     PHYSICAL EXAM:      Vitals:    09/27/22 0756   BP: (!) 140/78   Pulse: (!) 116   Weight: 278 lb (126.1 kg)   Height: 6' 4\" (1.93 m)     Constitutional: well-developed, no acute distress  Eyes: conjunctivae normal, no xanthelasma   Ears, Nose, Throat: oral mucosa moist, no cyanosis   CV: no JVD. Irregular and tachycardic rate and rhythm. Normal S1S2 and no S3. No murmurs, rubs, or gallops. PMI is nondisplaced  Lungs: clear to auscultation bilaterally, normal respiratory effort without used of accessory muscles  Abdomen: soft, non-tender, bowel sounds present, no masses or hepatomegaly   Musculoskeletal: no digital clubbing, no edema   Skin: warm, no rashes     Data:    No results for input(s): WBC, HGB, HCT, PLT in the last 72 hours. No results for input(s): NA, K, CL, CO2, BUN, CREATININE, GLU, CALCIUM in the last 72 hours.     Invalid input(s): MAGNESIUM  Lab Results   Component Value Date/Time    MG 2.2 09/02/2022 10:00 AM     No results for input(s): TSH in the last 72 hours. No results for input(s): INR in the last 72 hours. EKG: Atrial fibrillation rate of 116 bpm   please see scan in Cardiology. Echocardiogram: 9/23/2022  Summary   Mildly dilated left ventricle. Borderline concentric left ventricular hypertrophy. No regional wall motion abnormalities seen with global hypokinesis. Moderate to severely reduced left ventricular systolic function. Borderline dilated right ventricle. Right ventricle global systolic function is reduced . Mildly enlarged right atrium size. Mild centrally directed mitral regurgitation is present. Mild tricuspid regurgitation. Device Interrogation:   Underlying rhythm: Atrial fibrillation  Mode: MVP R 70 -120, decreased rate to 60 at lower rate  Battery Voltage/Longevity: 3.0 V, 10.2 years  Pacing: A: 33.9%  RV: 12.9%   P wave: 1.5 mV  Impedance: 494 ohms   Threshold: N/A  RV R wave: 7.9 mV  Impedance: 684 ohms   Threshold: 1.0 V @0.4 ms  Episodes: 60% atrial fibrillation burden. Atrial events x3 treated AT episodes all unsuccessful, last one on 9/22/2022. Current episode of atrial fibrillation lasting 4 days and 10 hours with some heart rates at times greater than 100 bpm.  Reprogramming included: Lower rate decreased to 60 bpm  Overall device function is normal    All device programmable settings were evaluated per above and in the scanned document, along with iterative adjustments (capture thresholds) to assess and select the most appropriate final programming to provide for consistent delivery of the appropriate therapy and to verify function of the device. Assessment and plan:    1. Persistent atrial fibrillation  - Symptomatic in the past but was unaware that he was in atrial fibrillation today and recently. States he has felt better in the past week.   - Diagnosed in 7/2016.  - [id number] of 4 on Xarelto for stroke risk reduction  - KEVIN/DC-CV and Tikosn initiation in 1/2017.   - PVI + LA + RA rotor, SVC isolation, CTI flutter and LA roof linear ablation in 11/2017  - Dofetilide stopping in 6/2018 with recurrent persistent AF in 9/2018. - Tikosyn initiation in 12/2018. - Status post pacemaker insertion 12/6/18 due to SND.  - Noted to have recurrent AF since 6/11/22; cardioverted 6/7/2022 with recurrence 4 days later. Ongoing atrial fibrillation since 6/11/2022 and had repeat cardioversion 9/2/2022 with recurrent atrial fibrillation on and off since that time  Estimated Creatinine Clearance: 73 mL/min (A) (based on SCr of 1.3 mg/dL (H)). 2. Tachy-vaughn syndrome  - Sinus node dysfunction with sinus arrest >5 seconds and need for Beta-blocker and Dofetilide therapy for rapid AF  - Status post pacemaker insertion on 12/6/18     3. Pacemaker  in situ  - [employer]; dual chamber  - Indication: SND  - DOI 12/6/18.  - Normal device function. 4. Chronic systolic CHF/presumed nonischemic  - Recovered LV EF in the past, now depressed ejection fraction again with echo on 9/23/2022 showing EF 30-35%  - LV EF 30-35% on TTE 12/19/16.  - LV EF 40-45% on TTE 1/31/17.  - LV EF 40% on KEVIN 11/6/17.  - LV EF 50-55% on TTE 3/22/18.  - NYHA class II, ACC stage C.  - Euvolemic and compensated. - Continue GDMT including Toprol XL,Lasix, starting Entresto tomorrow      5. CARLO  - Continual daily compliance with CPAP. 6. Obesity  - Recommend weight loss  Body mass index is 33.84 kg/m². 7. Diabetes mellitus  -On multiple oral agents as well as Lantus     8. Hypertension  - Well controlled, stable with systolic blood pressure 837N prior to starting Entresto     9. Hyperlipidemia  - On Lipitor and Fenofibric acid     10. BPH  - On Flomax     11. Vitamin deficiency  - On Vitamins D supplement.      12. Nonsustained ventricular tachycardia  - Noted per previous interrogation.  - Brief  - Asymptomatic.  - On Toprol XL     Recommendations:     1. Increase Toprol to 150 mg in the am and 100 mg in pm   2. Will discuss alternative antiarrhythmic medication versus referral for repeat ablation with ; unclear why patient is on low dose tikosyn. He does have CKD but Estimated Creatinine Clearance: 73 mL/min (A) (based on SCr of 1.3 mg/dL (H)). 3. GDMT currently being optimized as he starts Entresto tomorrow. 4.  Continue Xarelto 20 mg daily  5. Continue remote monitoring every 91 days  6. Continue following with Dr. Sanjuanita Storm with cardiology as scheduled  7. Follow-up in the office in 3 months or sooner with changes over the phone after discussion with        Re-education on importance of well controlled HTN (goal BP < 130/80), adequate weight control (goal BMI of < 27), physical activity consisting of moderate cardiopulmonary exercise up to a goal of 250 min/wk, smoking/tobacco abstinence and limited ETOH intake. I have spent a total of 40 minutes with the patient and the family reviewing the above stated recommendations. And a total of >50% of that time involved face-to-face time providing counseling and or coordination of care with the other providers. Thank you for allowing me to participate in your patient's care. Please call me if there are any questions or concerns.       DEIDRE Christensen - CNP  Cardiac Electrophysiology  [facility]) Physicians  [facility] [place]  8:02 AM  9/27/2022

## 2022-09-27 NOTE — Clinical Note
Recurrent A. fib after cardioversion in June and now after repeat cardioversion in September. He is on Tikosyn 125 mcg unsure why the low dose as his creatinine clearance would allow higher dose. Now has EF 30-35 being optimized on GDMT with cardiology. I increased his Toprol today but do not know if you want to discuss alternative antiarrhythmic medication versus redo ablation. He is pretty active and I am not sure if amiodarone would be a good long-term choice at his age and activity level.   Thanks Sally Calderon

## 2022-09-28 ENCOUNTER — TELEPHONE (OUTPATIENT)
Dept: CARDIOLOGY CLINIC | Age: 72
End: 2022-09-28

## 2022-09-28 NOTE — TELEPHONE ENCOUNTER
----- Message from Noe Winchester MD sent at 9/23/2022  1:55 PM EDT -----  These notify patient that echocardiogram demonstrates significant deterioration of heart muscle function which may be in part related to his recurrent atrial arrhythmias and suboptimally controlled heart rates prior to most recent cardioversion.   Recommend adjustment of his medical regimen with discontinuation of lisinopril and 2 days following discontinuation initiation of sacubitril/valsartan 24-26 mg (please give patient samples) and add him to my schedule for follow-up during my October office week for reevaluation

## 2022-10-05 ENCOUNTER — OFFICE VISIT (OUTPATIENT)
Dept: CARDIOLOGY CLINIC | Age: 72
End: 2022-10-05
Payer: MEDICARE

## 2022-10-05 VITALS
BODY MASS INDEX: 34.83 KG/M2 | HEIGHT: 76 IN | SYSTOLIC BLOOD PRESSURE: 116 MMHG | RESPIRATION RATE: 18 BRPM | DIASTOLIC BLOOD PRESSURE: 66 MMHG | WEIGHT: 286 LBS | HEART RATE: 109 BPM | OXYGEN SATURATION: 95 %

## 2022-10-05 DIAGNOSIS — G47.33 OBSTRUCTIVE SLEEP APNEA: ICD-10-CM

## 2022-10-05 DIAGNOSIS — E78.2 MIXED HYPERLIPIDEMIA: ICD-10-CM

## 2022-10-05 DIAGNOSIS — I48.19 PERSISTENT ATRIAL FIBRILLATION (HCC): Primary | ICD-10-CM

## 2022-10-05 DIAGNOSIS — Z79.4 TYPE 2 DIABETES MELLITUS WITH STAGE 3A CHRONIC KIDNEY DISEASE, WITH LONG-TERM CURRENT USE OF INSULIN (HCC): ICD-10-CM

## 2022-10-05 DIAGNOSIS — E11.22 TYPE 2 DIABETES MELLITUS WITH STAGE 3A CHRONIC KIDNEY DISEASE, WITH LONG-TERM CURRENT USE OF INSULIN (HCC): ICD-10-CM

## 2022-10-05 DIAGNOSIS — I50.42 CHRONIC COMBINED SYSTOLIC AND DIASTOLIC CONGESTIVE HEART FAILURE (HCC): ICD-10-CM

## 2022-10-05 DIAGNOSIS — N18.31 TYPE 2 DIABETES MELLITUS WITH STAGE 3A CHRONIC KIDNEY DISEASE, WITH LONG-TERM CURRENT USE OF INSULIN (HCC): ICD-10-CM

## 2022-10-05 DIAGNOSIS — I42.8 NONISCHEMIC CARDIOMYOPATHY (HCC): ICD-10-CM

## 2022-10-05 DIAGNOSIS — I49.5 SINUS NODE DYSFUNCTION (HCC): ICD-10-CM

## 2022-10-05 DIAGNOSIS — E66.8 MODERATE OBESITY: ICD-10-CM

## 2022-10-05 PROCEDURE — 93000 ELECTROCARDIOGRAM COMPLETE: CPT | Performed by: INTERNAL MEDICINE

## 2022-10-05 PROCEDURE — 1123F ACP DISCUSS/DSCN MKR DOCD: CPT | Performed by: INTERNAL MEDICINE

## 2022-10-05 PROCEDURE — 3044F HG A1C LEVEL LT 7.0%: CPT | Performed by: INTERNAL MEDICINE

## 2022-10-05 PROCEDURE — 99215 OFFICE O/P EST HI 40 MIN: CPT | Performed by: INTERNAL MEDICINE

## 2022-10-05 NOTE — PROGRESS NOTES
OUTPATIENT CARDIOLOGY FOLLOW-UP    Name: Helen Ulloa    Age: 67 y.o. Primary Care Physician: Karley Zimmerman MD    Date of Service: 10/5/2022    Chief Complaint: Persistent atrial fibrillation, sinus node dysfunction, nonischemic cardiomyopathy, chronic combined systolic and diastolic heart failure, chronic kidney disease, moderate obesity, obstructive sleep apnea    Interim History: Since his most recent evaluation, while the patient remains symptomatically stable, objective evaluation in the face of his persistent atrial fibrillation with suboptimal rate control has demonstrated deterioration of left ventricular systolic function with an echocardiogram of September, 2022 demonstrating evidence of a mildly dilated left ventricular chamber with borderline concentric left ventricular hypertrophy and moderately severe left ventricular systolic dysfunction with an estimated ejection fraction of 30-35% with borderline right ventricular dilatation associated right ventricular dysfunction as well as that of mild mitral regurgitation. He is tolerated optimization of medical therapy with substitution of sacubitril/valsartan for his existing angiotensin converting enzyme inhibitor and remains normotensive. In the interim, he has been evaluated by the electrophysiology service and in light of his suboptimal rate control further modification of his beta-blocker dosage. At the time of his evaluation, in spite of persistent suboptimal rate control he denies symptoms of anginal-like chest discomfort or other ischemic equivalents, decompensated left ventricular dysfunction or volume overload nor arrhythmia related manifestations and denies symptoms of a focal neurologic origin or bleeding. He remains compliant with the use of his nocturnal CPAP. At the time of evaluation he remains normotensive. Review of Systems:    The remainder of a complete multisystem review including consitutional, central nervous, respiratory, circulatory, gastrointestinal, genitourinary, endocrinologic, hematologic, musculoskeletal and psychiatric are negative.     Past Medical History:  Past Medical History:   Diagnosis Date    Arthritis     Asbestos exposure     Atrial fibrillation ([facility] 2016    w/RVR    BPH (benign prostatic hyperplasia)     Cardiomyopathy (HCC)     CHF (congestive heart failure) (HCC)     CKD (chronic kidney disease)     Diabetes mellitus ([facility]     History of cardioversion 2018    admitted for [facility] therapy    Hyperlipidemia     Hypertension     Obesity     Sleep apnea     CPAP       Past Surgical History:  Past Surgical History:   Procedure Laterality Date    ATRIAL ABLATION SURGERY  2017    atrial fib    CARDIOVERSION  2016    CARDIOVERSION  11/15/2016    DCCV    CARDIOVERSION  2017    Dr. Amaya See  2017    CARDIOVERSION  2022    Successful (Dr. Debra Galloway)    [facility]  2018    D-PPM  ([employer])  KHUNNAWAT    ROTATOR CUFF REPAIR Right 2015    TRANSESOPHAGEAL ECHOCARDIOGRAM  2017       Family History:  Family History   Problem Relation Age of Onset    Alcohol Abuse Mother     Alcohol Abuse Father     Cancer Brother         prostate    Heart Disease Maternal Grandfather        Social History:  Social History     Socioeconomic History    Marital status:      Spouse name: Not on file    Number of children: Not on file    Years of education: Not on file    Highest education level: Not on file   Occupational History    Not on file   Tobacco Use    Smoking status: Former     Packs/day: 1.00     Years: 5.00     Pack years: 5.00     Types: Cigarettes     Quit date: 1972     Years since quittin.7    Smokeless tobacco: Never   Vaping Use    Vaping Use: Never used   Substance and Sexual Activity    Alcohol use: Not Currently    Drug use: Never    Sexual activity: Not on file   Other Topics Concern    Not on file   Social History Narrative    Drinks 1 pot of coffee daily. Social Determinants of Health     Financial Resource Strain: Not on file   Food Insecurity: Not on file   Transportation Needs: Not on file   Physical Activity: Not on file   Stress: Not on file   Social Connections: Not on file   Intimate Partner Violence: Not on file   Housing Stability: Not on file       Allergies:  No Known Allergies    Current Medications:  Current Outpatient Medications   Medication Sig Dispense Refill    metoprolol succinate (TOPROL XL) 50 MG extended release tablet Take 3 tablets by mouth See Admin Instructions 150 mg in the am and 100 mg in the pm. 180 tablet 1    sacubitril-valsartan (ENTRESTO) 24-26 MG per tablet Take 1 tablet by mouth 2 times daily 180 tablet 3    dofetilide (TIKOSYN) 125 MCG capsule TAKE 1 CAPSULE EVERY 12 HOURS 180 capsule 1    rivaroxaban (XARELTO) 20 MG TABS tablet TAKE 1 TABLET DAILY 90 tablet 3    Ascorbic Acid (VITAMIN C) 100 MG tablet Take 100 mg by mouth in the morning.       tadalafil (CIALIS) 5 MG tablet Take 5 mg by mouth daily      Cholecalciferol (VITAMIN D3) 1.25 MG (64904 UT) TABS Take 1 capsule by mouth once a week      furosemide (LASIX) 20 MG tablet TAKE 1 TABLET TWICE A DAY (Patient taking differently: Take 20 mg by mouth daily TAKE) 180 tablet 3    magnesium oxide (MAG-OX) 400 (240 Mg) MG tablet Take 1 tablet by mouth daily 30 tablet 5    metFORMIN (GLUCOPHAGE) 1000 MG tablet Take 1 tablet by mouth 2 times daily (with meals) 60 tablet 3    linagliptin (TRADJENTA) 5 MG tablet Take 5 mg by mouth daily      Glucosamine 500 MG CAPS Take 2,000 mg by mouth daily       JARDIANCE 25 MG tablet Take 25 mg by mouth daily       glipiZIDE (GLUCOTROL) 10 MG tablet Take 10 mg by mouth 2 times daily (before meals)       LANTUS SOLOSTAR 100 UNIT/ML injection pen Inject 24 Units into the skin nightly      atorvastatin (LIPITOR) 40 MG tablet Take 40 mg by mouth daily       fenofibric acid (FIBRICOR) 135 MG CPDR capsule Take 135 mg by mouth daily       Multiple Vitamin (MULTI VITAMIN DAILY PO) Take by mouth daily      CPAP Machine MISC Inhale into the lungs nightly       tamsulosin (FLOMAX) 0.4 MG capsule Take 0.4 mg by mouth daily. folic acid (FOLVITE) 803 MCG tablet Take 400 mcg by mouth in the morning. NONFORMULARY CBD Gummies (Patient not taking: No sig reported)       No current facility-administered medications for this visit. Physical Exam:  /66 (Site: Left Upper Arm, Position: Sitting, Cuff Size: Medium Adult)   Pulse (!) 109   Resp 18   Ht 6' 4\" (1.93 m)   Wt 286 lb (129.7 kg)   SpO2 95%   BMI 34.81 kg/m²   Wt Readings from Last 3 Encounters:   10/05/22 286 lb (129.7 kg)   09/27/22 278 lb (126.1 kg)   09/02/22 278 lb (126.1 kg)     The patient is awake, alert and in no discomfort or distress. No gross musculoskeletal deformity is present. No significant skin or nail changes are present. Gross examination of head, eyes, nose and throat are negative. Jugular venous pressure is normal and no carotid bruits are present. Normal respiratory effort is noted with no accessory muscle usage present. Lung fields are clear to ascultation. Cardiac examination is notable for an irregular rhythm with no palpable thrill. No gallop rhythm or cardiac murmur are identified. A benign abdominal examination is present with the exception of obesity and no masses or organomegaly. Intact pulses are present throughout all extremities and no peripheral edema is present. No focal neurologic deficits are present.     Laboratory Tests:  Lab Results   Component Value Date    CREATININE 1.3 (H) 09/02/2022    BUN 22 09/02/2022     09/02/2022    K 4.9 09/02/2022     (H) 09/02/2022    CO2 23 09/02/2022     No results found for: BNP  Lab Results   Component Value Date/Time    WBC 6.5 09/02/2022 10:00 AM    RBC 4.78 09/02/2022 10:00 AM    HGB 13.6 09/02/2022 10:00 AM    HCT 42.1 09/02/2022 10:00 AM MCV 88.1 09/02/2022 10:00 AM    MCH 28.5 09/02/2022 10:00 AM    MCHC 32.3 09/02/2022 10:00 AM    RDW 17.0 09/02/2022 10:00 AM     09/02/2022 10:00 AM    MPV 11.7 09/02/2022 10:00 AM     No results for input(s): ALKPHOS, ALT, AST, PROT, BILITOT, BILIDIR, LABALBU in the last 72 hours. Lab Results   Component Value Date/Time    MG 2.2 09/02/2022 10:00 AM     Lab Results   Component Value Date/Time    PROTIME 15.7 12/05/2018 08:06 PM    INR 1.4 12/05/2018 08:06 PM     Lab Results   Component Value Date/Time    TSH 2.430 07/22/2022 10:09 AM     No components found for: CHLPL  Lab Results   Component Value Date    TRIG 127 07/22/2022    TRIG 93 01/28/2022    TRIG 189 (H) 06/24/2020     Lab Results   Component Value Date    HDL 30 07/22/2022    HDL 37 01/28/2022    HDL 34 06/11/2021     Lab Results   Component Value Date    LDLCALC 60 07/22/2022    LDLCALC 47 01/28/2022    [address] 06/11/2021       Cardiac Tests:  ECG: A resting electrocardiogram demonstrates evidence of atrial fibrillation with a mean ventricular response of approximately 110 bpm with low voltage within the limb leads, left axis deviation, an intraventricular conduction delay a delayed precordial transition zone and nonspecific ST changes      ASSESSMENT / PLAN: On a clinical basis, the patient remains compensated from cardiovascular standpoint in the face of his persistent atrial fibrillation with persistent suboptimal rate control. I have extensively discussed this with him and temporary have maintained his existing medical regimen pending review of repeat serum chemistries to reevaluate renal function electrolytes following the initiation of his sacubitril/valsartan with the potential of further dose modification to further assist optimization of cardiac performance.   In addition based on his atherosclerotic risk in spite of the likely rate related nature of his systolic dysfunction, a vasodilator myocardial perfusion imaging study has been recommended for further prognostic assessment of his coronary circulation to guide additional management recommendations will provide additional recommendations as appropriate following its completion review. Further management of his atrial arrhythmias including that of modification of his antiarrhythmic dosing and/or additional ablative management of his atrial fibrillation will be deferred to the electrophysiology service. His medical regimen is otherwise optimized and includes that of a SGLT2 inhibitor both for management of his diabetes as well as that of his heart failure with ongoing needs of aggressive risk factor modification of blood pressure, diabetes and serum lipids with goals of maintaining LDL cholesterol levels below 70 mg/dL to reduce risk of atherosclerotic development. Otherwise plan his clinical reevaluation in approximately 3 months and would happily reevaluate him in the interim should additional cardiovascular difficulties or concerns arise. The patient's current medication list, allergies, problem list and results of all previously ordered testing were reviewed at today's visit. Follow-up office visit in 3 months      Note: This report was completed using computerized voice recognition software. Every effort has been made to ensure accuracy, however; inadvertent computerized transcription errors may be present. Aubrey Torres.  [address] [facility]    An electronic copy of this follow-up progress note was forwarded to Dr. Jaison Slater and Chandan Newberry

## 2022-10-06 LAB
BUN BLDV-MCNC: 27 MG/DL
CALCIUM SERPL-MCNC: 9.2 MG/DL
CHLORIDE BLD-SCNC: 109 MMOL/L
CO2: 25 MMOL/L
CREAT SERPL-MCNC: 1.33 MG/DL
GFR CALCULATED: 53
GLUCOSE BLD-MCNC: 71 MG/DL
POTASSIUM SERPL-SCNC: 4.4 MMOL/L
SODIUM BLD-SCNC: 141 MMOL/L

## 2022-10-07 ENCOUNTER — TELEPHONE (OUTPATIENT)
Dept: CARDIOLOGY CLINIC | Age: 72
End: 2022-10-07

## 2022-10-07 DIAGNOSIS — I50.42 CHRONIC COMBINED SYSTOLIC AND DIASTOLIC CONGESTIVE HEART FAILURE (HCC): ICD-10-CM

## 2022-10-07 DIAGNOSIS — I50.42 CHRONIC COMBINED SYSTOLIC AND DIASTOLIC CONGESTIVE HEART FAILURE (HCC): Primary | ICD-10-CM

## 2022-10-07 DIAGNOSIS — I48.19 PERSISTENT ATRIAL FIBRILLATION (HCC): ICD-10-CM

## 2022-10-07 DIAGNOSIS — I42.8 NONISCHEMIC CARDIOMYOPATHY (HCC): ICD-10-CM

## 2022-10-07 NOTE — TELEPHONE ENCOUNTER
Called patient regarding above. Patient will be gone out of the [place] from [place]11[place]11[place]05. He will up his dose when he gets back and will have the labs drawn a week from there. Will send patient the order in the mail       MD Andressa Zavala  Please notify patient that laboratory studies were reviewed with kidney function remaining stable and slight changes of his potassium levels not of concern following most recent evaluation. Recommend increasing sacubitril/valsartan dosage to 49/51 mg twice daily with repeat basic metabolic profile next week.

## 2022-11-04 DIAGNOSIS — I42.8 NONISCHEMIC CARDIOMYOPATHY (HCC): ICD-10-CM

## 2022-11-04 DIAGNOSIS — I48.19 PERSISTENT ATRIAL FIBRILLATION (HCC): Primary | ICD-10-CM

## 2022-11-07 LAB
BUN BLDV-MCNC: 22 MG/DL
CALCIUM SERPL-MCNC: 8.8 MG/DL
CHLORIDE BLD-SCNC: 110 MMOL/L
CO2: 25 MMOL/L
CREAT SERPL-MCNC: 1.34 MG/DL
GFR CALCULATED: 52
GLUCOSE BLD-MCNC: 66 MG/DL
POTASSIUM SERPL-SCNC: 5 MMOL/L
SODIUM BLD-SCNC: 142 MMOL/L

## 2022-11-08 DIAGNOSIS — I48.19 PERSISTENT ATRIAL FIBRILLATION (HCC): Primary | ICD-10-CM

## 2022-11-08 DIAGNOSIS — R06.09 EXERTIONAL DYSPNEA: ICD-10-CM

## 2022-11-08 DIAGNOSIS — I42.8 NONISCHEMIC CARDIOMYOPATHY (HCC): ICD-10-CM

## 2022-11-10 ENCOUNTER — TELEPHONE (OUTPATIENT)
Dept: CARDIOLOGY CLINIC | Age: 72
End: 2022-11-10

## 2022-11-10 RX ORDER — SACUBITRIL AND VALSARTAN 24; 26 MG/1; MG/1
1 TABLET, FILM COATED ORAL 2 TIMES DAILY
Qty: 180 TABLET | Refills: 3 | OUTPATIENT
Start: 2022-11-10

## 2022-11-10 NOTE — TELEPHONE ENCOUNTER
Patient called said he got his labs done and wants to know if he should continue to take Entresto twice a day or can he cut back down to once a day.  Please advise

## 2022-11-10 NOTE — TELEPHONE ENCOUNTER
Called patient back with instructions and he will be out of town and will get BMP repeated in 3 weeks.

## 2022-11-11 ENCOUNTER — HOSPITAL ENCOUNTER (OUTPATIENT)
Dept: CARDIOLOGY | Age: 72
Discharge: HOME | End: 2022-11-11

## 2022-11-11 RX ORDER — SACUBITRIL AND VALSARTAN 49; 51 MG/1; MG/1
1 TABLET, FILM COATED ORAL 2 TIMES DAILY
Qty: 180 TABLET | Refills: 3 | Status: SHIPPED | OUTPATIENT
Start: 2022-11-11

## 2022-11-11 RX ORDER — SACUBITRIL AND VALSARTAN 49; 51 MG/1; MG/1
1 TABLET, FILM COATED ORAL 2 TIMES DAILY
COMMUNITY
End: 2022-11-11 | Stop reason: SDUPTHER

## 2022-11-18 RX ORDER — FUROSEMIDE 20 MG/1
20 TABLET ORAL DAILY
Qty: 30 TABLET | Refills: 3 | Status: SHIPPED | OUTPATIENT
Start: 2022-11-18

## 2022-11-29 RX ORDER — METOPROLOL SUCCINATE 50 MG/1
TABLET, EXTENDED RELEASE ORAL
Qty: 540 TABLET | Refills: 3 | Status: SHIPPED | OUTPATIENT
Start: 2022-11-29

## 2022-11-30 ENCOUNTER — TELEPHONE (OUTPATIENT)
Dept: CARDIOLOGY | Age: 72
End: 2022-11-30

## 2022-11-30 NOTE — TELEPHONE ENCOUNTER
Spoke with patient and confirmed Pharmacological  stress test appointment on 12/2/2022 at 0815. Instructions for test,given, and COVID-19 preprocedure information reviewed with patient, questions answered. Patient verbalized understanding.

## 2022-12-02 ENCOUNTER — HOSPITAL ENCOUNTER (OUTPATIENT)
Dept: CARDIOLOGY | Age: 72
Discharge: HOME | End: 2022-12-02
Payer: MEDICARE

## 2022-12-02 ENCOUNTER — TELEPHONE (OUTPATIENT)
Dept: CARDIOLOGY CLINIC | Age: 72
End: 2022-12-02

## 2022-12-02 VITALS
RESPIRATION RATE: 12 BRPM | BODY MASS INDEX: 33.85 KG/M2 | SYSTOLIC BLOOD PRESSURE: 104 MMHG | HEIGHT: 76 IN | DIASTOLIC BLOOD PRESSURE: 70 MMHG | WEIGHT: 278 LBS | HEART RATE: 88 BPM

## 2022-12-02 DIAGNOSIS — I42.8 NONISCHEMIC CARDIOMYOPATHY (HCC): ICD-10-CM

## 2022-12-02 DIAGNOSIS — I48.19 PERSISTENT ATRIAL FIBRILLATION (HCC): ICD-10-CM

## 2022-12-02 DIAGNOSIS — R06.09 EXERTIONAL DYSPNEA: ICD-10-CM

## 2022-12-02 PROCEDURE — 6360000002 HC RX W HCPCS: Performed by: INTERNAL MEDICINE

## 2022-12-02 PROCEDURE — 3430000000 HC RX DIAGNOSTIC RADIOPHARMACEUTICAL: Performed by: INTERNAL MEDICINE

## 2022-12-02 PROCEDURE — 2580000003 HC RX 258: Performed by: INTERNAL MEDICINE

## 2022-12-02 PROCEDURE — A9502 TC99M TETROFOSMIN: HCPCS | Performed by: INTERNAL MEDICINE

## 2022-12-02 PROCEDURE — 78452 HT MUSCLE IMAGE SPECT MULT: CPT

## 2022-12-02 PROCEDURE — 93017 CV STRESS TEST TRACING ONLY: CPT

## 2022-12-02 RX ORDER — ALBUTEROL SULFATE 90 UG/1
AEROSOL, METERED RESPIRATORY (INHALATION)
COMMUNITY
Start: 2022-11-23

## 2022-12-02 RX ORDER — SODIUM CHLORIDE 0.9 % (FLUSH) 0.9 %
10 SYRINGE (ML) INJECTION PRN
Status: DISCONTINUED | OUTPATIENT
Start: 2022-12-02 | End: 2022-12-03 | Stop reason: HOSPADM

## 2022-12-02 RX ADMIN — SODIUM CHLORIDE, PRESERVATIVE FREE 10 ML: 5 INJECTION INTRAVENOUS at 10:25

## 2022-12-02 RX ADMIN — TETROFOSMIN 10.7 MILLICURIE: 0.23 INJECTION, POWDER, LYOPHILIZED, FOR SOLUTION INTRAVENOUS at 08:36

## 2022-12-02 RX ADMIN — SODIUM CHLORIDE, PRESERVATIVE FREE 10 ML: 5 INJECTION INTRAVENOUS at 08:36

## 2022-12-02 RX ADMIN — TETROFOSMIN 31.9 MILLICURIE: 0.23 INJECTION, POWDER, LYOPHILIZED, FOR SOLUTION INTRAVENOUS at 10:25

## 2022-12-02 RX ADMIN — SODIUM CHLORIDE, PRESERVATIVE FREE 10 ML: 5 INJECTION INTRAVENOUS at 10:24

## 2022-12-02 RX ADMIN — REGADENOSON 0.4 MG: 0.08 INJECTION, SOLUTION INTRAVENOUS at 10:24

## 2022-12-02 NOTE — TELEPHONE ENCOUNTER
----- Message from Cristobal Oseguera MD sent at 12/2/2022  2:02 PM EST -----  Please notify patient that stress test demonstrated no evidence to suggest significant artery disease has developed with moderate weakening of heart muscle function essentially unchanged from that previously known

## 2022-12-02 NOTE — PROCEDURES
[address] [facility],[address] [facility] and V[facility] Lab - [address]  [phone]               Pharmacologic Stress Nuclear Gated SPECT Study    Name: Hakeem Kern Account Number: [de-identified]    :  1950          Sex: male         Date of Study:  2022    Height: 6' 4\" (193 cm)         Weight: 278 lb (126.1 kg)     Ordering Provider: Odette Leblanc MD          PCP: Kylah Barnett MD      Cardiologist: Odette Leblanc MD             Interpreting Physician: Odette Leblanc MD  _________________________________________________________________________________    Indication:   Detecting the presence and location of coronary artery disease    Clinical History:   Patient has no known history of coronary artery disease. Resting ECG:    HR 86 bpm  Atrial fibrillation with nonspecific ST changes    Procedure:   Pharmacologic stress testing was performed with regadenoson 0.4 mg for 15 seconds. The heart rate was 86 at baseline and devan to 107 beats during the infusion. The blood pressure at baseline was 104/70 and blood pressure at the end of infusion was 98/60. Blood pressure response was normal during the stress procedure. The patient tolerated the infusion well. ECG during the infusion did not change. IMAGING: Myocardial perfusion imaging was performed at rest 30-35 minutes following the intravenous injection of 10.7 mCi of (Tc-tetrofosmin) followed by 10 ml of Normal Saline. As per infusion protocol, the patient was injected intravenously with 31.9 mCi of (Tc-tetrofosmin) followed by 10 ml of Normal Saline. Gated post-stress tomographic imaging was performed 45 minutes after stress. FINDINGS: The overall quality of the study was good. Left ventricular cavity size was noted to be enlarged on both rest and stress studies. Rotational analog analysis demonstrated no patient motion or abnormal extracardiac radioactivity.     The gated SPECT stress imaging in the short, vertical long, and horizontal long axis demonstrated normal homogeneous tracer distribution throughout the myocardium both on the post regadenoson and resting images. Gated SPECT left ventricular ejection fraction unable to be calculated on the basis of atrial fibrillation with normal myocardial thickening and wall motion and no regional wall motion abnormalities and moderate left ventricular systolic dysfunction . Impression:    Electrocardiographically normal regadenoson infusion with a clinically nonischemic response. Myocardial perfusion imaging was normal.    Overall left ventricular systolic function was  moderately impaired with a severely dilated left ventricular chamber and global left ventricular systolic dysfunction . 4. Intermediate risk pharmacologic stress test    Thank you for sending your patient to this [employer].      Electronically signed by Becka Hill MD on 12/2/22 at 2:00 PM EST

## 2022-12-23 ENCOUNTER — TELEPHONE (OUTPATIENT)
Dept: NON INVASIVE DIAGNOSTICS | Age: 72
End: 2022-12-23

## 2022-12-23 NOTE — TELEPHONE ENCOUNTER
----- Message from Eboni Cavazos MD sent at 12/20/2022  6:52 PM EST -----  Routine Pacemaker remote device check with normal device function    Implant Indication: SND, EF 30 - 35% 9/2022, hx AF/AFL (DCCV 9/2/2022)  Per medication list, pt taking Tikosyn, Metoprolol S, Mag-Ox, Xarelto  Presenting rhythm: AF Vs/; 2 undersensed deflections on RV channel  per [employer] - likely undersensed PVC- consider increasing RV sensitivity to 0.9mV  Battery Longevity: 10.1yrs  RVp 0.1%  Lead trends are stable and within normal limits  RVp 0.2%  Created Radha Franklin 12/20/2022 08:32Edited By: Adrianna Bruce 12/20/2022 08:47  Tachycardia: AF  1  AT/AF [place]: 100% since 12-Sep-2022  Avg V rate ~ 90 - 110 bpm  EGMs are c/w AF Vs w/ intermittent RVR, intermittent RA undersensing  Atrial Pace-Terminated Episodes 1 of 33 (3.0%)  RA sensitivity programmed 0.3mV  Created By: Adrianna Bruce 12/20/2022 08:33  Tachycardia: AF w/RVR  1  71 HVR episodes binned as VT-NS (3) or Fast A&V (68) detected since 9/2022 clinic check- increased  Limited log shows longest duration 11:20(M:S), avg V rates 150 - 200 bpm  EGMs are c/w AF w/ RVR  Mean V rates during AF are ~90 - 100 bpm, peak V rates ~188 - 200 bpm  Created ByOmega Oyster 12/20/2022 08:45Edited By: Adrianna Bruce 12/20/2022 08:46    Need Tikosyn dose up titration with admission versus switch to Amiodarone. Thanks.

## 2022-12-23 NOTE — TELEPHONE ENCOUNTER
I spoke to [facility] and gave him AAD recommendations as per CK. He has an upcoming OV w/ CK and will discuss at that time.

## 2023-01-02 NOTE — PROGRESS NOTES
[address] [facility] Electrophysiology  Outpatient Progress Note  Gonzalez Ricardo  1950  Date of Service: 1/3/2023  PCP: David Lyles MD  Cardiologist: Dr Yariel Bear   Electrophysiologist:  Haven Steven MD        Subjective: Gonzalez Ricardo is seen for follow-up and management of atrial fibrillation and pacemaker in situ. The patient last DC-cardioversion on 9/2/22. The patient was found to be in 100% AF since 9/12/22 on remote interrogation 12/23/22. The patient was advised Tikosyn dose up titration versus Amiodarone therapy. He opted to come in for discussion today. His echocardiogram on 9/23/22 showed LV EF of 30-35%. His Lisinopril was changed to [place] at the end of September of 2022. Nuclear stress test on 12/2/22 showed no ischemia and LV EF could not be calculated. The patient reports feeling tired easily and can not walk long distance like he used to when he is in AF. The patient denies any chest pain, dyspnea, palpitations, dizziness, syncope, orthopnea or paroxysmal nocturnal dyspnea. He presents today in AF with HR of 101 bpm. Discussed with him options of rhythm control treatment including Tikosyn dose adjustment versus switch to Amiodarone versus redo ablation.  He opts for Tikosyn dose up titration first.     Patient Active Problem List   Diagnosis    Persistent atrial fibrillation (HCC)    Type 2 diabetes mellitus with stage 3a chronic kidney disease, with long-term current use of insulin (HCC)    Moderate obesity    LV dysfunction    Nonischemic cardiomyopathy (HCC)    Chronic combined systolic and diastolic congestive heart failure (HCC)    Obstructive sleep apnea    Mixed hyperlipidemia    Typical atrial flutter (HCC)    Paroxysmal atrial fibrillation (HCC)    Tachy-vaughn syndrome ([address]    Sinus node dysfunction (HCC)    Status post catheter ablation of atrial fibrillation    Cardiac pacemaker in situ    Actinic keratosis    Benign hypertensive renal disease    Changes in skin texture    Epidermoid cyst of skin    Erectile dysfunction    Essential hypertension    Fatigue    Hyperkalemia    Hypertrophic condition of skin    Inflamed seborrheic keratosis    Lichenification and lichen simplex chronicus    Medical care complication       Current Outpatient Medications   Medication Sig Dispense Refill    metoprolol succinate (TOPROL XL) 50 MG extended release tablet TAKE 3 TABLETS (150 MG) IN THE MORNING AND 2 TABLETS (100 MG) IN THE EVENING 540 tablet 3    furosemide (LASIX) 20 MG tablet Take 1 tablet by mouth daily TAKE 30 tablet 3    sacubitril-valsartan (ENTRESTO) 49-51 MG per tablet Take 1 tablet by mouth 2 times daily 180 tablet 3    dofetilide (TIKOSYN) 125 MCG capsule TAKE 1 CAPSULE EVERY 12 HOURS 180 capsule 1    rivaroxaban (XARELTO) 20 MG TABS tablet TAKE 1 TABLET DAILY 90 tablet 3    Ascorbic Acid (VITAMIN C) 100 MG tablet Take 100 mg by mouth in the morning.       tadalafil (CIALIS) 5 MG tablet Take 5 mg by mouth daily      Cholecalciferol (VITAMIN D3) 1.25 MG (09350 UT) TABS Take 1 capsule by mouth once a week      magnesium oxide (MAG-OX) 400 (240 Mg) MG tablet Take 1 tablet by mouth daily 30 tablet 5    metFORMIN (GLUCOPHAGE) 1000 MG tablet Take 1 tablet by mouth 2 times daily (with meals) 60 tablet 3    linagliptin (TRADJENTA) 5 MG tablet Take 5 mg by mouth daily      Glucosamine 500 MG CAPS Take 2,000 mg by mouth daily       JARDIANCE 25 MG tablet Take 25 mg by mouth daily       glipiZIDE (GLUCOTROL) 10 MG tablet Take 10 mg by mouth 2 times daily (before meals)       LANTUS SOLOSTAR 100 UNIT/ML injection pen Inject 24 Units into the skin nightly      atorvastatin (LIPITOR) 40 MG tablet Take 40 mg by mouth daily       fenofibric acid (FIBRICOR) 135 MG CPDR capsule Take 135 mg by mouth daily       Multiple Vitamin (MULTI VITAMIN DAILY PO) Take by mouth daily      CPAP Machine MISC Inhale into the lungs nightly       tamsulosin (FLOMAX) 0.4 MG capsule Take 0.4 mg by mouth daily. albuterol sulfate HFA (PROVENTIL;VENTOLIN;PROAIR) 108 (90 Base) MCG/ACT inhaler INHALE 2 PUFFS BY MOUTH EVERY 4 HOURS AS NEEDED (Patient not taking: No sig reported)      NONFORMULARY CBD Gummies (Patient not taking: No sig reported)       No current facility-administered medications for this visit. No Known Allergies    ROS:   Constitutional: Negative for fever, activity change and appetite change. HENT: Negative for epistaxis. Eyes: Negative for diploplia, blurred vision. Respiratory: Negative for cough, chest tightness, shortness of breath and wheezing. Cardiovascular: pertinent positives in HPI  Gastrointestinal: Negative for abdominal pain and blood in stool. All other review of systems are negative     PHYSICAL EXAM:     Vitals:    01/03/23 0757   BP: 114/78   Site: Left Upper Arm   Position: Sitting   Cuff Size: Medium Adult   Pulse: (!) 101   Resp: 16   Weight: 294 lb (133.4 kg)   Height: 6' 4\" (1.93 m)   Constitutional: well-developed, no acute distress  Eyes: conjunctivae normal, no xanthelasma   Ears, Nose, Throat: oral mucosa moist, no cyanosis   CV: no JVD. Irregularly irregular rate and rhythm. No murmurs, rubs, or gallops. PMI is nondisplaced  Lungs: clear to auscultation bilaterally, normal respiratory effort without used of accessory muscles  Abdomen: soft, non-tender, bowel sounds present, no masses or hepatomegaly   Musculoskeletal: no digital clubbing, no edema   Skin: warm, no rashes   Pacemaker site: well healed. No erosion, infection or migration    Data:    No results for input(s): WBC, HGB, HCT, PLT in the last 72 hours. No results for input(s): NA, K, CL, CO2, BUN, CREATININE, GLU, CALCIUM in the last 72 hours. Invalid input(s):  MAGNESIUM  Lab Results   Component Value Date/Time    MG 2.2 09/02/2022 10:00 AM     No results for input(s): TSH in the last 72 hours.   No results for input(s): INR in the last 72 hours.    EKG 01/03/23 : Atrial fibrillation rate 101 bpm with occasional V paced, QTc 431 ms   please see scan in Cardiology. Echocardiogram: 9/23/2022  Summary   Mildly dilated left ventricle. Borderline concentric left ventricular hypertrophy. No regional wall motion abnormalities seen with global hypokinesis. Moderate to severely reduced left ventricular systolic function. Borderline dilated right ventricle. Right ventricle global systolic function is reduced . Mildly enlarged right atrium size. Mild centrally directed mitral regurgitation is present. Mild tricuspid regurgitation. Nuclear stress test 12/2/22:  FINDINGS: The overall quality of the study was good. Left ventricular cavity size was noted to be enlarged on both rest and stress studies. Rotational analog analysis demonstrated no patient motion or abnormal extracardiac radioactivity. The gated SPECT stress imaging in the short, vertical long, and horizontal long axis demonstrated normal homogeneous tracer distribution throughout the myocardium both on the post regadenoson and resting images. Gated SPECT left ventricular ejection fraction unable to be calculated on the basis of atrial fibrillation with normal myocardial thickening and wall motion and no regional wall motion abnormalities and moderate left ventricular systolic dysfunction . Impression:    Electrocardiographically normal regadenoson infusion with a clinically nonischemic response. Myocardial perfusion imaging was normal.    Overall left ventricular systolic function was  moderately impaired with a severely dilated left ventricular chamber and global left ventricular systolic dysfunction . 4. Intermediate risk pharmacologic stress test     Thank you for sending your patient to this [facility].       Electronically signed by Milly Osborn MD on 12/2/22 Device Interrogation 01/03/23:   Underlying rhythm: AF Vs  Mode: MVPR 60 -120  Battery Voltage/Longevity: 2.99 V, 10 years  Pacing: A: 0.1%  RV: 15.1%   P wave: 1.3 mV  Impedance: 494 ohms   Threshold: AF  RV R wave: 7.5 mV  Impedance: 627 ohms   Threshold: 0.5 V @0.4 ms  Episodes: 100% atrial fibrillation burden since 9/2022  Reprogramming included: none  Overall device function is normal    All device programmable settings were evaluated per above and in the scanned document, along with iterative adjustments (capture thresholds) to assess and select the most appropriate final programming to provide for consistent delivery of the appropriate therapy and to verify function of the device. Assessment and plan:    1. Persistent atrial fibrillation  - Symptomatic in the past.  - Diagnosed in 7/2016.  - [id number] of 4 on Xarelto for stroke risk reduction  - KEVIN/DC-CV and Tikosyn initiation in 1/2017.   - PVI + LA + RA rotor, SVC isolation, CTI flutter and LA roof linear ablation in 11/2017  - Dofetilide stopping in 6/2018 with recurrent persistent AF in 9/2018. - Tikosyn initiation in 12/2018. - Status post pacemaker insertion 12/6/18 due to SND.  - Noted to have recurrent AF since 6/11/22; cardioverted 6/7/2022 with recurrence 4 days later.    - Ongoing atrial fibrillation since 6/11/2022 and had repeat cardioversion 9/2/2022 with recurrent atrial fibrillation since 9/12/22. - Presents in AF with stable QTc.  - CrCl by IBW of 60.25 mL/min based on Cr of 1.34 on 11/7/22. - Options of treatment discussed including up titration of Tikosyn versus switch to Amiodarone versus redo AF ablation. He opts for  for Tikosyn dose up titration first.      2. Tachy-vaughn syndrome  - Sinus node dysfunction with sinus arrest >5 seconds and need for Beta-blocker and Dofetilide therapy for rapid AF  - Status post pacemaker insertion on 12/6/18     3.  Pacemaker  in situ  - [employer]; dual chamber  - Indication: SND  - DOI 12/6/18.  - Normal device function. 4. Chronic systolic CHF and presumed nonischemic  - Recovered LV EF in the past, now depressed ejection fraction again with echo on 9/23/2022 showing EF 30-35%  - LV EF 30-35% on TTE 12/19/16.  - LV EF 40-45% on TTE 1/31/17.  - LV EF 40% on KEVIN 11/6/17.  - LV EF 50-55% on TTE 3/22/18.  - LV EF 30-35% on TTE 9/23/22.  - NYHA class II, ACC stage C.  - Euvolemic and compensated. - Continue GDMT including Toprol XL,Lasix, Entresto and Tradjenta     5. Nonsustained ventricular tachycardia  - Noted per previous interrogation.  - Brief  - Asymptomatic.  - On Toprol XL     6. Hypertension  - Well controlled. - Toprol XL,Lasix and Entresto    7. Hyperlipidemia  - On Lipitor and Fenofibric acid    8. Diabetes mellitus  -On Glucophage, Tradjenta, Glucotrol, Jardiance and Lantus     9. CARLO  - Continual daily compliance with CPAP. 10. Obesity  - Recommend weight loss  Body mass index is 35.79 kg/m². 11. BPH  - On Flomax     12. Vitamin D deficiency  - On Vitamins D supplement. Recommendations:     1. The patient to consider up titration of Tikosyn dose versus switch to Amiodarone versus redo AF ablation. He opts for Tikosyn dose adjustment will arrange hospital admission. 2. Continue Toprol  mg in AM and 100 mg in PM.   3. Continue Tikosyn 125 mcg every 12 hours. 4. Continue Xarelto 20 mg daily. 5. Advised using [place] LifeVest in the meantime. He agrees. 6. Repeat echocardiogram in 4 to 6 weeks once sinus rhythm is restored. If LV EF remains less than or equal to 35%, he would likely benefit from ICD upgrade for primary prevention of SCD at that time. 7. Continue following with Cardiology as scheduled. 8. Obtain EKG, BMP and magnesium every 3 to 6 months while on Tikosyn. 9. Continue remote monitoring every 91 days  10. Follow-up after the procedure or in 3 months. Encouraged the patient to call the office for any questions or concerns.     Thank you for allowing me to participate in your patient's care. Please call me if there are any questions or concerns. I have spent a total of 40 minutes with the patient and the family reviewing the above stated recommendations. And a total of >50% of that time involved face-to-face time providing counseling and/or coordination of care with the other providers, preparation for the clinic visit, reviewing records/tests, counseling/education of the patient, ordering medications/tests/procedures, coordinating care, and documenting clinical information in the EHR.      Candi Valencia MD  Cardiac Electrophysiology  [address]  [facility]  8:11 AM  1/3/2023

## 2023-01-03 ENCOUNTER — OFFICE VISIT (OUTPATIENT)
Dept: NON INVASIVE DIAGNOSTICS | Age: 73
End: 2023-01-03
Payer: MEDICARE

## 2023-01-03 VITALS
HEART RATE: 101 BPM | DIASTOLIC BLOOD PRESSURE: 78 MMHG | SYSTOLIC BLOOD PRESSURE: 114 MMHG | HEIGHT: 76 IN | RESPIRATION RATE: 16 BRPM | BODY MASS INDEX: 35.8 KG/M2 | WEIGHT: 294 LBS

## 2023-01-03 DIAGNOSIS — I48.19 PERSISTENT ATRIAL FIBRILLATION (HCC): Primary | ICD-10-CM

## 2023-01-03 DIAGNOSIS — Z95.0 PACEMAKER: ICD-10-CM

## 2023-01-03 DIAGNOSIS — I42.8 NONISCHEMIC CARDIOMYOPATHY (HCC): Primary | ICD-10-CM

## 2023-01-03 PROBLEM — T88.9XXA MEDICAL CARE COMPLICATION: Status: ACTIVE | Noted: 2023-01-03

## 2023-01-03 PROBLEM — L82.0 INFLAMED SEBORRHEIC KERATOSIS: Status: ACTIVE | Noted: 2023-01-03

## 2023-01-03 PROBLEM — N52.9 ERECTILE DYSFUNCTION: Status: ACTIVE | Noted: 2021-01-19

## 2023-01-03 PROBLEM — L28.0 LICHENIFICATION AND LICHEN SIMPLEX CHRONICUS: Status: ACTIVE | Noted: 2023-01-03

## 2023-01-03 PROBLEM — R53.83 FATIGUE: Status: ACTIVE | Noted: 2021-01-19

## 2023-01-03 PROBLEM — I10 ESSENTIAL HYPERTENSION: Status: ACTIVE | Noted: 2023-01-03

## 2023-01-03 PROBLEM — I12.9 BENIGN HYPERTENSIVE RENAL DISEASE: Status: ACTIVE | Noted: 2021-01-19

## 2023-01-03 PROBLEM — L72.0 EPIDERMOID CYST OF SKIN: Status: ACTIVE | Noted: 2023-01-03

## 2023-01-03 PROBLEM — R23.4 CHANGES IN SKIN TEXTURE: Status: ACTIVE | Noted: 2023-01-03

## 2023-01-03 PROBLEM — L91.9 HYPERTROPHIC CONDITION OF SKIN: Status: ACTIVE | Noted: 2023-01-03

## 2023-01-03 PROBLEM — L57.0 ACTINIC KERATOSIS: Status: ACTIVE | Noted: 2023-01-03

## 2023-01-03 PROBLEM — E78.00 PURE HYPERCHOLESTEROLEMIA: Status: ACTIVE | Noted: 2023-01-03

## 2023-01-03 PROBLEM — E87.5 HYPERKALEMIA: Status: ACTIVE | Noted: 2021-01-19

## 2023-01-03 PROCEDURE — 1036F TOBACCO NON-USER: CPT | Performed by: INTERNAL MEDICINE

## 2023-01-03 PROCEDURE — 1123F ACP DISCUSS/DSCN MKR DOCD: CPT | Performed by: INTERNAL MEDICINE

## 2023-01-03 PROCEDURE — G8427 DOCREV CUR MEDS BY ELIG CLIN: HCPCS | Performed by: INTERNAL MEDICINE

## 2023-01-03 PROCEDURE — G8417 CALC BMI ABV UP PARAM F/U: HCPCS | Performed by: INTERNAL MEDICINE

## 2023-01-03 PROCEDURE — 3078F DIAST BP <80 MM HG: CPT | Performed by: INTERNAL MEDICINE

## 2023-01-03 PROCEDURE — 99215 OFFICE O/P EST HI 40 MIN: CPT | Performed by: INTERNAL MEDICINE

## 2023-01-03 PROCEDURE — 3017F COLORECTAL CA SCREEN DOC REV: CPT | Performed by: INTERNAL MEDICINE

## 2023-01-03 PROCEDURE — G8482 FLU IMMUNIZE ORDER/ADMIN: HCPCS | Performed by: INTERNAL MEDICINE

## 2023-01-03 PROCEDURE — 3074F SYST BP LT 130 MM HG: CPT | Performed by: INTERNAL MEDICINE

## 2023-01-04 ENCOUNTER — TELEPHONE (OUTPATIENT)
Dept: NON INVASIVE DIAGNOSTICS | Age: 73
End: 2023-01-04

## 2023-01-04 ENCOUNTER — OFFICE VISIT (OUTPATIENT)
Dept: CARDIOLOGY CLINIC | Age: 73
End: 2023-01-04
Payer: MEDICARE

## 2023-01-04 VITALS
HEIGHT: 76 IN | WEIGHT: 291.2 LBS | RESPIRATION RATE: 18 BRPM | OXYGEN SATURATION: 99 % | HEART RATE: 86 BPM | DIASTOLIC BLOOD PRESSURE: 76 MMHG | SYSTOLIC BLOOD PRESSURE: 126 MMHG | BODY MASS INDEX: 35.46 KG/M2

## 2023-01-04 DIAGNOSIS — I48.19 PERSISTENT ATRIAL FIBRILLATION (HCC): ICD-10-CM

## 2023-01-04 DIAGNOSIS — N18.31 TYPE 2 DIABETES MELLITUS WITH STAGE 3A CHRONIC KIDNEY DISEASE, WITH LONG-TERM CURRENT USE OF INSULIN (HCC): ICD-10-CM

## 2023-01-04 DIAGNOSIS — E78.00 PURE HYPERCHOLESTEROLEMIA: ICD-10-CM

## 2023-01-04 DIAGNOSIS — I49.5 SINUS NODE DYSFUNCTION (HCC): ICD-10-CM

## 2023-01-04 DIAGNOSIS — E11.22 TYPE 2 DIABETES MELLITUS WITH STAGE 3A CHRONIC KIDNEY DISEASE, WITH LONG-TERM CURRENT USE OF INSULIN (HCC): ICD-10-CM

## 2023-01-04 DIAGNOSIS — G47.33 OBSTRUCTIVE SLEEP APNEA: ICD-10-CM

## 2023-01-04 DIAGNOSIS — I42.8 NONISCHEMIC CARDIOMYOPATHY (HCC): Primary | ICD-10-CM

## 2023-01-04 DIAGNOSIS — Z79.4 TYPE 2 DIABETES MELLITUS WITH STAGE 3A CHRONIC KIDNEY DISEASE, WITH LONG-TERM CURRENT USE OF INSULIN (HCC): ICD-10-CM

## 2023-01-04 DIAGNOSIS — I50.42 CHRONIC COMBINED SYSTOLIC AND DIASTOLIC CONGESTIVE HEART FAILURE (HCC): ICD-10-CM

## 2023-01-04 DIAGNOSIS — I10 ESSENTIAL HYPERTENSION: ICD-10-CM

## 2023-01-04 DIAGNOSIS — E66.8 MODERATE OBESITY: ICD-10-CM

## 2023-01-04 PROCEDURE — 3017F COLORECTAL CA SCREEN DOC REV: CPT | Performed by: INTERNAL MEDICINE

## 2023-01-04 PROCEDURE — 3074F SYST BP LT 130 MM HG: CPT | Performed by: INTERNAL MEDICINE

## 2023-01-04 PROCEDURE — 2022F DILAT RTA XM EVC RTNOPTHY: CPT | Performed by: INTERNAL MEDICINE

## 2023-01-04 PROCEDURE — G8417 CALC BMI ABV UP PARAM F/U: HCPCS | Performed by: INTERNAL MEDICINE

## 2023-01-04 PROCEDURE — G8427 DOCREV CUR MEDS BY ELIG CLIN: HCPCS | Performed by: INTERNAL MEDICINE

## 2023-01-04 PROCEDURE — G8482 FLU IMMUNIZE ORDER/ADMIN: HCPCS | Performed by: INTERNAL MEDICINE

## 2023-01-04 PROCEDURE — 93000 ELECTROCARDIOGRAM COMPLETE: CPT | Performed by: INTERNAL MEDICINE

## 2023-01-04 PROCEDURE — 3046F HEMOGLOBIN A1C LEVEL >9.0%: CPT | Performed by: INTERNAL MEDICINE

## 2023-01-04 PROCEDURE — 99215 OFFICE O/P EST HI 40 MIN: CPT | Performed by: INTERNAL MEDICINE

## 2023-01-04 PROCEDURE — 1123F ACP DISCUSS/DSCN MKR DOCD: CPT | Performed by: INTERNAL MEDICINE

## 2023-01-04 PROCEDURE — 3078F DIAST BP <80 MM HG: CPT | Performed by: INTERNAL MEDICINE

## 2023-01-04 PROCEDURE — 1036F TOBACCO NON-USER: CPT | Performed by: INTERNAL MEDICINE

## 2023-01-04 NOTE — TELEPHONE ENCOUNTER
Patient is undergoing a Colonoscopy within the next couple weeks and anesthesia would like a letting stating it is ok for patient to proceed while wearing a LifeVest. We also talked to Raul Guerra from [place] he said patient is able to take vest off for procedure.  Please advise, thank you

## 2023-01-04 NOTE — PROGRESS NOTES
OUTPATIENT CARDIOLOGY FOLLOW-UP    Name: Mya Markham    Age: 67 y.o. Primary Care Physician: Carlos Shaw MD    Date of Service: 1/4/2023    Chief Complaint: Nonischemic cardiomyopathy, chronic combined systolic and diastolic heart failure, persistent atrial fibrillation, sinus node dysfunction, hypertension, chronic kidney disease, moderate obesity, obstructive sleep apnea    Interim History: Since his most recent evaluation, the patient remains compensated from a cardiovascular standpoint while finally relating a component of limitation of his functional capabilities based on his inability to continue activity at his previous pace. He denies other changes of his functional capabilities and has experienced no symptoms of anginal-like chest discomfort or other ischemic equivalents, decompensated left ventricular systolic dysfunction or volume overload nor arrhythmia related manifestations. He denies symptoms of a focal neurologic origin or bleeding in the face of his chronic anticoagulation. Since his most recent evaluation, he has gained approximately 5 pounds and remains normotensive. Interim laboratory studies demonstrated stable renal function and electrolytes as well as that of appropriate control of his serum lipids. Interim myocardial perfusion imaging demonstrated no evidence of perfusion abnormalities with persistent evidence of moderate left ventricular systolic dysfunction with ongoing tolerance of his existing medical regimen.   He was evaluated by the electrophysiology service face of his persistent atrial fibrillation with an extensive discussion of options and present plans of hospitalization for modification of his dofetilide dosage likely associated with repeat attempt of electrical cardioversion in attempt to restore and maintain sinus rhythm and plan for reevaluation of left ventricular systolic function following attempted resolution of his atrial arrhythmias and adverse effects of tachycardia superimposed upon optimal medical therapy. In addition on a temporary basis, an external cardioverter defibrillator has been suggested with potential needs of upgrade of his existing permanent pacemaker to provide primary arrhythmia protection as appropriate. At the time of evaluation, he additionally relates plans of colonoscopy on elective basis later this month. Review of Systems: The remainder of a complete multisystem review including consitutional, central nervous, respiratory, circulatory, gastrointestinal, genitourinary, endocrinologic, hematologic, musculoskeletal and psychiatric are negative.     Past Medical History:  Past Medical History:   Diagnosis Date    Arthritis     Asbestos exposure     Atrial fibrillation ([facility] 07/2016    w/RVR    BPH (benign prostatic hyperplasia)     Cardiomyopathy (HCC)     CHF (congestive heart failure) (HCC)     CKD (chronic kidney disease)     Diabetes mellitus ([facility]     History of cardioversion 12/04/2018    admitted for [facility] therapy    Hyperlipidemia     Hypertension     Obesity     Sleep apnea     CPAP       Past Surgical History:  Past Surgical History:   Procedure Laterality Date    ATRIAL ABLATION SURGERY  11/06/2017    atrial fib    CARDIOVERSION  08/23/2016    CARDIOVERSION  11/15/2016    DCCV    CARDIOVERSION  01/30/2017    Dr. Lauren Ariza  04/04/2017    CARDIOVERSION  09/02/2022    Successful (Dr. Zelalem Neves)    [facility]  12/06/2018    D-PPM  ([employer])  WILFREDOAWAT    ROTATOR CUFF REPAIR Right 2015    TRANSESOPHAGEAL ECHOCARDIOGRAM  01/30/2017       Family History:  Family History   Problem Relation Age of Onset    Alcohol Abuse Mother     Alcohol Abuse Father     Cancer Brother         prostate    Heart Disease Maternal Grandfather        Social History:  Social History     Socioeconomic History    Marital status:      Spouse name: Not on file    Number of children: Not on file    Years of education: Not on file    Highest education level: Not on file   Occupational History    Not on file   Tobacco Use    Smoking status: Former     Packs/day: 1.00     Years: 5.00     Pack years: 5.00     Types: Cigarettes     Quit date: 1972     Years since quittin.0    Smokeless tobacco: Never   Vaping Use    Vaping Use: Never used   Substance and Sexual Activity    Alcohol use: Not Currently    Drug use: Never    Sexual activity: Not on file   Other Topics Concern    Not on file   Social History Narrative    Drinks 1 pot of coffee daily. Social Determinants of Health     Financial Resource Strain: Not on file   Food Insecurity: Not on file   Transportation Needs: Not on file   Physical Activity: Not on file   Stress: Not on file   Social Connections: Not on file   Intimate Partner Violence: Not on file   Housing Stability: Not on file       Allergies:  No Known Allergies    Current Medications:  Current Outpatient Medications   Medication Sig Dispense Refill    metoprolol succinate (TOPROL XL) 50 MG extended release tablet TAKE 3 TABLETS (150 MG) IN THE MORNING AND 2 TABLETS (100 MG) IN THE EVENING 540 tablet 3    furosemide (LASIX) 20 MG tablet Take 1 tablet by mouth daily TAKE 30 tablet 3    sacubitril-valsartan (ENTRESTO) 49-51 MG per tablet Take 1 tablet by mouth 2 times daily 180 tablet 3    dofetilide (TIKOSYN) 125 MCG capsule TAKE 1 CAPSULE EVERY 12 HOURS 180 capsule 1    rivaroxaban (XARELTO) 20 MG TABS tablet TAKE 1 TABLET DAILY 90 tablet 3    Ascorbic Acid (VITAMIN C) 100 MG tablet Take 100 mg by mouth in the morning.       tadalafil (CIALIS) 5 MG tablet Take 5 mg by mouth daily      Cholecalciferol (VITAMIN D3) 1.25 MG (44153 UT) TABS Take 1 capsule by mouth once a week      magnesium oxide (MAG-OX) 400 (240 Mg) MG tablet Take 1 tablet by mouth daily 30 tablet 5    metFORMIN (GLUCOPHAGE) 1000 MG tablet Take 1 tablet by mouth 2 times daily (with meals) 60 tablet 3 linagliptin (TRADJENTA) 5 MG tablet Take 5 mg by mouth daily      Glucosamine 500 MG CAPS Take 2,000 mg by mouth daily       JARDIANCE 25 MG tablet Take 25 mg by mouth daily       glipiZIDE (GLUCOTROL) 10 MG tablet Take 10 mg by mouth 2 times daily (before meals)       LANTUS SOLOSTAR 100 UNIT/ML injection pen Inject 24 Units into the skin nightly      atorvastatin (LIPITOR) 40 MG tablet Take 40 mg by mouth daily       fenofibric acid (FIBRICOR) 135 MG CPDR capsule Take 135 mg by mouth daily       Multiple Vitamin (MULTI VITAMIN DAILY PO) Take by mouth daily      CPAP Machine MISC Inhale into the lungs nightly       tamsulosin (FLOMAX) 0.4 MG capsule Take 0.4 mg by mouth daily. No current facility-administered medications for this visit. Physical Exam:  /76 (Site: Left Upper Arm, Position: Sitting, Cuff Size: Large Adult)   Pulse 86   Resp 18   Ht 6' 4\" (1.93 m)   Wt 291 lb 3.2 oz (132.1 kg)   SpO2 99%   BMI 35.45 kg/m²   Wt Readings from Last 3 Encounters:   01/04/23 291 lb 3.2 oz (132.1 kg)   01/03/23 294 lb (133.4 kg)   12/02/22 278 lb (126.1 kg)     The patient is awake, alert and in no discomfort or distress. No gross musculoskeletal deformity is present. No significant skin or nail changes are present. Gross examination of head, eyes, nose and throat are negative. Jugular venous pressure is normal and no carotid bruits are present. Normal respiratory effort is noted with no accessory muscle usage present. Lung fields are clear to ascultation. Cardiac examination is notable for an irregular rhythm with no palpable thrill. No gallop rhythm or cardiac murmur are identified. A benign abdominal examination is present with exception of obesity no masses or organomegaly. Intact pulses are present throughout all extremities and no peripheral edema is present. No focal neurologic deficits are present.     Laboratory Tests:  Lab Results   Component Value Date    CREATININE 1.34 11/07/2022    BUN 22 11/07/2022     11/07/2022    K 5.0 11/07/2022     11/07/2022    CO2 25 11/07/2022     No results found for: BNP  Lab Results   Component Value Date/Time    WBC 6.5 09/02/2022 10:00 AM    RBC 4.78 09/02/2022 10:00 AM    HGB 13.6 09/02/2022 10:00 AM    HCT 42.1 09/02/2022 10:00 AM    MCV 88.1 09/02/2022 10:00 AM    MCH 28.5 09/02/2022 10:00 AM    MCHC 32.3 09/02/2022 10:00 AM    RDW 17.0 09/02/2022 10:00 AM     09/02/2022 10:00 AM    MPV 11.7 09/02/2022 10:00 AM     No results for input(s): ALKPHOS, ALT, AST, PROT, BILITOT, BILIDIR, LABALBU in the last 72 hours. Lab Results   Component Value Date/Time    MG 2.2 09/02/2022 10:00 AM     Lab Results   Component Value Date/Time    PROTIME 15.7 12/05/2018 08:06 PM    INR 1.4 12/05/2018 08:06 PM     Lab Results   Component Value Date/Time    TSH 2.430 07/22/2022 10:09 AM     No components found for: CHLPL  Lab Results   Component Value Date    TRIG 127 07/22/2022    TRIG 93 01/28/2022    TRIG 189 (H) 06/24/2020     Lab Results   Component Value Date    HDL 30 07/22/2022    HDL 37 01/28/2022    HDL 34 06/11/2021     Lab Results   Component Value Date    LDLCALC 60 07/22/2022    LDLCALC 47 01/28/2022    [address] 06/11/2021       Cardiac Tests:  ECG: A resting electrocardiogram demonstrates evidence of atrial fibrillation with a mean ventricular sponsor approximately 85 bpm with intermittent ventricular pacing and evidence of left axis deviation, an intraventricular conduction delay, delayed precordial transition and nonspecific ST change      ASSESSMENT / PLAN: On a clinical basis, the patient presently appears compensated from a cardiovascular standpoint and at present I have not altered his existing medical regimen, especially in light of plans of upcoming hospitalization to modify his antiarrhythmic therapy and attempt to restore sinus rhythm to optimize cardiac performance.   Following review of these results and his follow-up echocardiogram in view of his persistent symptomatology, further optimization of his medical regimen, specifically that of his sacubitril/valsartan may be appropriate. I have extensively discussed his needs of ongoing lifestyle modification to achieve weight reduction to benefit diastolic cardiac performance and assistance in management of the diastolic component of his heart failure in addition to assisting management of his obstructive sleep apnea in addition to that of ongoing compliance with the use of nocturnal CPAP. Independent of the presence or absence of an external cardioverter defibrillator, based on plans of restoring sinus rhythm and needs of uninterrupted anticoagulation, I have recommended postponement of his elective colonoscopy until appropriate anticoagulation has been maintained to permit temporary interruption to proceed with needs of cautious monitoring of his volume status during the preparation and performance of the procedure. Ongoing aggressive risk factor modification of blood pressure, diabetes and serum lipids will remain essential to reducing risk of future atherosclerotic development. I presently plan his clinical reevaluation in approximately 1 month and would happily reevaluate him in the interim should additional cardiovascular difficulties or concerns arise. The patient's current medication list, allergies, problem list and results of all previously ordered testing were reviewed at today's visit. Follow-up office visit in 3 months      Note: This report was completed using computerized voice recognition software. Every effort has been made to ensure accuracy, however; inadvertent computerized transcription errors may be present. Jason Ortiz.  [facility], [address] [place] [address] Cardiology    An electronic copy of this follow-up progress note was forwarded to Dr. Dede Gu and Delia Monroy

## 2023-01-11 ENCOUNTER — HOSPITAL ENCOUNTER (INPATIENT)
Age: 73
LOS: 3 days | Discharge: HOME | End: 2023-01-14
Attending: INTERNAL MEDICINE | Admitting: INTERNAL MEDICINE
Payer: MEDICARE

## 2023-01-11 LAB
ALBUMIN SERPL-MCNC: 4.4 G/DL (ref 3.5–5.2)
ALP BLD-CCNC: 62 U/L (ref 40–129)
ALT SERPL-CCNC: 15 U/L (ref 0–40)
ANION GAP SERPL CALCULATED.3IONS-SCNC: 12 MMOL/L (ref 7–16)
AST SERPL-CCNC: 21 U/L (ref 0–39)
BILIRUB SERPL-MCNC: 0.5 MG/DL (ref 0–1.2)
BUN BLDV-MCNC: 32 MG/DL (ref 6–23)
CALCIUM SERPL-MCNC: 9.2 MG/DL (ref 8.6–10.2)
CHLORIDE BLD-SCNC: 105 MMOL/L (ref 98–107)
CO2: 25 MMOL/L (ref 22–29)
CREAT SERPL-MCNC: 1.2 MG/DL (ref 0.7–1.2)
GFR SERPL CREATININE-BSD FRML MDRD: >60 ML/MIN/1.73
GLUCOSE BLD-MCNC: 94 MG/DL (ref 74–99)
HCT VFR BLD CALC: 44.4 % (ref 37–54)
HEMOGLOBIN: 14.7 G/DL (ref 12.5–16.5)
MAGNESIUM: 2.4 MG/DL (ref 1.6–2.6)
MCH RBC QN AUTO: 30.3 PG (ref 26–35)
MCHC RBC AUTO-ENTMCNC: 33.1 % (ref 32–34.5)
MCV RBC AUTO: 91.5 FL (ref 80–99.9)
METER GLUCOSE: 110 MG/DL (ref 74–99)
PDW BLD-RTO: 15.9 FL (ref 11.5–15)
PLATELET # BLD: 245 E9/L (ref 130–450)
PMV BLD AUTO: 11.3 FL (ref 7–12)
POTASSIUM SERPL-SCNC: 4.8 MMOL/L (ref 3.5–5)
RBC # BLD: 4.85 E12/L (ref 3.8–5.8)
SODIUM BLD-SCNC: 142 MMOL/L (ref 132–146)
TOTAL PROTEIN: 7.3 G/DL (ref 6.4–8.3)
WBC # BLD: 5.8 E9/L (ref 4.5–11.5)

## 2023-01-11 PROCEDURE — 99223 1ST HOSP IP/OBS HIGH 75: CPT | Performed by: INTERNAL MEDICINE

## 2023-01-11 PROCEDURE — 2140000000 HC CCU INTERMEDIATE R&B

## 2023-01-11 PROCEDURE — 82962 GLUCOSE BLOOD TEST: CPT

## 2023-01-11 PROCEDURE — 83735 ASSAY OF MAGNESIUM: CPT

## 2023-01-11 PROCEDURE — 6370000000 HC RX 637 (ALT 250 FOR IP): Performed by: INTERNAL MEDICINE

## 2023-01-11 PROCEDURE — 93005 ELECTROCARDIOGRAM TRACING: CPT | Performed by: INTERNAL MEDICINE

## 2023-01-11 PROCEDURE — 85027 COMPLETE CBC AUTOMATED: CPT

## 2023-01-11 PROCEDURE — 2580000003 HC RX 258: Performed by: INTERNAL MEDICINE

## 2023-01-11 PROCEDURE — 80053 COMPREHEN METABOLIC PANEL: CPT

## 2023-01-11 PROCEDURE — 36415 COLL VENOUS BLD VENIPUNCTURE: CPT

## 2023-01-11 RX ORDER — LANOLIN ALCOHOL/MO/W.PET/CERES
400 CREAM (GRAM) TOPICAL DAILY
Status: DISCONTINUED | OUTPATIENT
Start: 2023-01-11 | End: 2023-01-14 | Stop reason: HOSPADM

## 2023-01-11 RX ORDER — DOFETILIDE 0.25 MG/1
250 CAPSULE ORAL EVERY 12 HOURS SCHEDULED
Status: DISCONTINUED | OUTPATIENT
Start: 2023-01-11 | End: 2023-01-11

## 2023-01-11 RX ORDER — ERGOCALCIFEROL 1.25 MG/1
50000 CAPSULE ORAL WEEKLY
Status: DISCONTINUED | OUTPATIENT
Start: 2023-01-18 | End: 2023-01-14 | Stop reason: HOSPADM

## 2023-01-11 RX ORDER — TADALAFIL 5 MG/1
5 TABLET ORAL DAILY
Status: DISCONTINUED | OUTPATIENT
Start: 2023-01-12 | End: 2023-01-14 | Stop reason: HOSPADM

## 2023-01-11 RX ORDER — ATORVASTATIN CALCIUM 40 MG/1
40 TABLET, FILM COATED ORAL DAILY
Status: DISCONTINUED | OUTPATIENT
Start: 2023-01-12 | End: 2023-01-14 | Stop reason: HOSPADM

## 2023-01-11 RX ORDER — SENNA PLUS 8.6 MG/1
1 TABLET ORAL DAILY PRN
Status: DISCONTINUED | OUTPATIENT
Start: 2023-01-11 | End: 2023-01-14 | Stop reason: HOSPADM

## 2023-01-11 RX ORDER — ACETAMINOPHEN 650 MG/1
650 SUPPOSITORY RECTAL EVERY 6 HOURS PRN
Status: DISCONTINUED | OUTPATIENT
Start: 2023-01-11 | End: 2023-01-14 | Stop reason: HOSPADM

## 2023-01-11 RX ORDER — FUROSEMIDE 20 MG/1
20 TABLET ORAL DAILY
Status: DISCONTINUED | OUTPATIENT
Start: 2023-01-11 | End: 2023-01-14 | Stop reason: HOSPADM

## 2023-01-11 RX ORDER — ACETAMINOPHEN 325 MG/1
650 TABLET ORAL EVERY 6 HOURS PRN
Status: DISCONTINUED | OUTPATIENT
Start: 2023-01-11 | End: 2023-01-14 | Stop reason: HOSPADM

## 2023-01-11 RX ORDER — FENOFIBRATE 160 MG/1
160 TABLET ORAL DAILY
Status: DISCONTINUED | OUTPATIENT
Start: 2023-01-11 | End: 2023-01-14 | Stop reason: HOSPADM

## 2023-01-11 RX ORDER — DOFETILIDE 0.12 MG/1
375 CAPSULE ORAL EVERY 12 HOURS SCHEDULED
Status: DISCONTINUED | OUTPATIENT
Start: 2023-01-12 | End: 2023-01-12

## 2023-01-11 RX ORDER — SODIUM CHLORIDE 0.9 % (FLUSH) 0.9 %
5-40 SYRINGE (ML) INJECTION PRN
Status: DISCONTINUED | OUTPATIENT
Start: 2023-01-11 | End: 2023-01-14 | Stop reason: HOSPADM

## 2023-01-11 RX ORDER — TAMSULOSIN HYDROCHLORIDE 0.4 MG/1
0.4 CAPSULE ORAL DAILY
Status: DISCONTINUED | OUTPATIENT
Start: 2023-01-11 | End: 2023-01-14 | Stop reason: HOSPADM

## 2023-01-11 RX ORDER — INSULIN GLARGINE-YFGN 100 [IU]/ML
24 INJECTION, SOLUTION SUBCUTANEOUS NIGHTLY
Status: DISCONTINUED | OUTPATIENT
Start: 2023-01-12 | End: 2023-01-14 | Stop reason: HOSPADM

## 2023-01-11 RX ORDER — GLIPIZIDE 5 MG/1
10 TABLET ORAL
Status: DISCONTINUED | OUTPATIENT
Start: 2023-01-11 | End: 2023-01-14 | Stop reason: HOSPADM

## 2023-01-11 RX ORDER — SODIUM CHLORIDE 0.9 % (FLUSH) 0.9 %
5-40 SYRINGE (ML) INJECTION EVERY 12 HOURS SCHEDULED
Status: DISCONTINUED | OUTPATIENT
Start: 2023-01-11 | End: 2023-01-14 | Stop reason: HOSPADM

## 2023-01-11 RX ORDER — MULTIVIT WITH MINERALS/LUTEIN
125 TABLET ORAL DAILY
Status: DISCONTINUED | OUTPATIENT
Start: 2023-01-11 | End: 2023-01-14 | Stop reason: HOSPADM

## 2023-01-11 RX ORDER — SODIUM CHLORIDE 9 MG/ML
INJECTION, SOLUTION INTRAVENOUS PRN
Status: DISCONTINUED | OUTPATIENT
Start: 2023-01-11 | End: 2023-01-14 | Stop reason: HOSPADM

## 2023-01-11 RX ORDER — ALOGLIPTIN 25 MG/1
25 TABLET, FILM COATED ORAL DAILY
Status: DISCONTINUED | OUTPATIENT
Start: 2023-01-11 | End: 2023-01-14 | Stop reason: HOSPADM

## 2023-01-11 RX ADMIN — METOPROLOL SUCCINATE 150 MG: 100 TABLET, EXTENDED RELEASE ORAL at 21:23

## 2023-01-11 RX ADMIN — DOFETILIDE 250 MCG: 0.25 CAPSULE ORAL at 18:55

## 2023-01-11 RX ADMIN — METFORMIN HYDROCHLORIDE 1000 MG: 500 TABLET ORAL at 17:52

## 2023-01-11 RX ADMIN — SODIUM CHLORIDE, PRESERVATIVE FREE 10 ML: 5 INJECTION INTRAVENOUS at 21:23

## 2023-01-11 RX ADMIN — GLIPIZIDE 10 MG: 5 TABLET ORAL at 17:52

## 2023-01-11 RX ADMIN — SACUBITRIL AND VALSARTAN 1 TABLET: 49; 51 TABLET, FILM COATED ORAL at 21:23

## 2023-01-11 ASSESSMENT — PAIN - FUNCTIONAL ASSESSMENT: PAIN_FUNCTIONAL_ASSESSMENT: ACTIVITIES ARE NOT PREVENTED

## 2023-01-11 ASSESSMENT — PAIN DESCRIPTION - PAIN TYPE: TYPE: CHRONIC PAIN

## 2023-01-11 ASSESSMENT — PAIN SCALES - GENERAL: PAINLEVEL_OUTOF10: 3

## 2023-01-11 ASSESSMENT — PAIN DESCRIPTION - LOCATION: LOCATION: BACK

## 2023-01-11 ASSESSMENT — PAIN DESCRIPTION - ORIENTATION: ORIENTATION: LOWER

## 2023-01-11 ASSESSMENT — PAIN DESCRIPTION - FREQUENCY: FREQUENCY: INTERMITTENT

## 2023-01-11 ASSESSMENT — PAIN DESCRIPTION - DESCRIPTORS: DESCRIPTORS: SORE

## 2023-01-11 NOTE — H&P
[address]  [place] [address] Electrophysiology  History and Physical Examination  Wayne Stanford  1950  Date of Service: 1/11/2023  PCP: Federico Dumont MD  Cardiologist: Dr Billie Burgos   Electrophysiologist:  Madelaine Willis MD        Subjective: Wayne Stanford is seen for follow-up and management of atrial fibrillation and pacemaker in situ. The patient last DC-cardioversion on 9/2/22. The patient was found to be in 100% AF since 9/12/22 on remote interrogation 12/23/22. The patient was advised Tikosyn dose up titration versus Amiodarone therapy. He opted to come in for discussion today. His echocardiogram on 9/23/22 showed LV EF of 30-35%. His Lisinopril was changed to [place] at the end of September of 2022. Nuclear stress test on 12/2/22 showed no ischemia and LV EF could not be calculated. The patient reports feeling tired easily and can not walk long distance like he used to when he is in AF. The patient denies any chest pain, dyspnea, palpitations, dizziness, syncope, orthopnea or paroxysmal nocturnal dyspnea. He presents today in AF with HR of 101 bpm. Discussed with him options of rhythm control treatment including Tikosyn dose adjustment versus switch to Amiodarone versus redo ablation. He opts for Tikosyn dose up titration first.     1/11/23: The patient is seen in the hospital for Tikosyn up titration and possible DC-cardioversion.      Patient Active Problem List   Diagnosis    Persistent atrial fibrillation (HCC)    Type 2 diabetes mellitus with stage 3a chronic kidney disease, with long-term current use of insulin (HCC)    Moderate obesity    LV dysfunction    Nonischemic cardiomyopathy (HCC)    Chronic combined systolic and diastolic congestive heart failure (HCC)    Obstructive sleep apnea    Mixed hyperlipidemia    Typical atrial flutter (HCC)    Paroxysmal atrial fibrillation (HCC)    Tachy-vaughn syndrome ([address]    Sinus node dysfunction ([address] Status post catheter ablation of atrial fibrillation    Cardiac pacemaker in situ    Actinic keratosis    Benign hypertensive renal disease    Changes in skin texture    Epidermoid cyst of skin    Erectile dysfunction    Essential hypertension    Fatigue    Hyperkalemia    Hypertrophic condition of skin    Inflamed seborrheic keratosis    Lichenification and lichen simplex chronicus    Medical care complication    Pure hypercholesterolemia       No current facility-administered medications for this encounter. No Known Allergies    ROS:   Constitutional: Negative for fever, activity change and appetite change. HENT: Negative for epistaxis. Eyes: Negative for diploplia, blurred vision. Respiratory: Negative for cough, chest tightness, shortness of breath and wheezing. Cardiovascular: pertinent positives in HPI  Gastrointestinal: Negative for abdominal pain and blood in stool. All other review of systems are negative     PHYSICAL EXAM:     Vitals:    01/11/23 1255   BP: 132/78   Pulse: 85   Resp: 18   Temp: 97.7 °F (36.5 °C)   SpO2: 98%   Constitutional: well-developed, no acute distress  Eyes: conjunctivae normal, no xanthelasma   Ears, Nose, Throat: oral mucosa moist, no cyanosis   CV: no JVD. Irregularly irregular rate and rhythm. No murmurs, rubs, or gallops. PMI is nondisplaced  Lungs: clear to auscultation bilaterally, normal respiratory effort without used of accessory muscles  Abdomen: soft, non-tender, bowel sounds present, no masses or hepatomegaly   Musculoskeletal: no digital clubbing, no edema   Skin: warm, no rashes   Pacemaker site: well healed. No erosion, infection or migration    Data:    No results for input(s): WBC, HGB, HCT, PLT in the last 72 hours. No results for input(s): NA, K, CL, CO2, BUN, CREATININE, GLU, CALCIUM in the last 72 hours.     Invalid input(s):  MAGNESIUM  Lab Results   Component Value Date/Time    MG 2.2 09/02/2022 10:00 AM     No results for input(s): TSH in the last 72 hours. No results for input(s): INR in the last 72 hours. EKG 01/11/23 : Atrial fibrillation rate 113 bpm, QTc 494 ms by manual calculation   please see scan in Cardiology. Echocardiogram: 9/23/2022  Summary   Mildly dilated left ventricle. Borderline concentric left ventricular hypertrophy. No regional wall motion abnormalities seen with global hypokinesis. Moderate to severely reduced left ventricular systolic function. Borderline dilated right ventricle. Right ventricle global systolic function is reduced . Mildly enlarged right atrium size. Mild centrally directed mitral regurgitation is present. Mild tricuspid regurgitation. Nuclear stress test 12/2/22:  FINDINGS: The overall quality of the study was good. Left ventricular cavity size was noted to be enlarged on both rest and stress studies. Rotational analog analysis demonstrated no patient motion or abnormal extracardiac radioactivity. The gated SPECT stress imaging in the short, vertical long, and horizontal long axis demonstrated normal homogeneous tracer distribution throughout the myocardium both on the post regadenoson and resting images. Gated SPECT left ventricular ejection fraction unable to be calculated on the basis of atrial fibrillation with normal myocardial thickening and wall motion and no regional wall motion abnormalities and moderate left ventricular systolic dysfunction . Impression:    Electrocardiographically normal regadenoson infusion with a clinically nonischemic response. Myocardial perfusion imaging was normal.    Overall left ventricular systolic function was  moderately impaired with a severely dilated left ventricular chamber and global left ventricular systolic dysfunction . 4. Intermediate risk pharmacologic stress test     Thank you for sending your patient to this [facility]. Electronically signed by Becka Hill MD on 12/2/22      Device Interrogation 1/3/23:  Underlying rhythm: AF Vs  Mode: MVPR 60 -120  Battery Voltage/Longevity: 2.99 V, 10 years  Pacing: A: 0.1%  RV: 15.1%   P wave: 1.3 mV  Impedance: 494 ohms   Threshold: AF  RV R wave: 7.5 mV  Impedance: 627 ohms   Threshold: 0.5 V @0.4 ms  Episodes: 100% atrial fibrillation burden since 9/2022  Reprogramming included: none  Overall device function is normal    All device programmable settings were evaluated per above and in the scanned document, along with iterative adjustments (capture thresholds) to assess and select the most appropriate final programming to provide for consistent delivery of the appropriate therapy and to verify function of the device. Assessment and plan:    1. Persistent atrial fibrillation  - Symptomatic in the past.  - Diagnosed in 7/2016.  - [id number] of 4 on Xarelto for stroke risk reduction  - KEVIN/DC-CV and Tikosyn initiation in 1/2017.   - PVI + LA + RA rotor, SVC isolation, CTI flutter and LA roof linear ablation in 11/2017  - Dofetilide stopping in 6/2018 with recurrent persistent AF in 9/2018. - Tikosyn initiation in 12/2018. - Status post pacemaker insertion 12/6/18 due to SND.  - Noted to have recurrent AF since 6/11/22; cardioverted 6/7/2022 with recurrence 4 days later.    - Ongoing atrial fibrillation since 6/11/2022 and had repeat cardioversion 9/2/2022 with recurrent atrial fibrillation since 9/12/22. - Presents in AF with stable QTc.  - CrCl by IBW of 60.25 mL/min based on Cr of 1.34 on 11/7/22. - Options of treatment discussed including up titration of Tikosyn versus switch to Amiodarone versus redo AF ablation. He opts for  for Tikosyn dose up titration first.      2. Tachy-vaughn syndrome  - Sinus node dysfunction with sinus arrest >5 seconds and need for Beta-blocker and Dofetilide therapy for rapid AF  - Status post pacemaker insertion on 12/6/18     3.  Pacemaker  in situ  - [employer]; dual chamber  - Indication: SND  - DOI 12/6/18.  - Normal device function. 4. Chronic systolic CHF and presumed nonischemic  - Recovered LV EF in the past, now depressed ejection fraction again with echo on 9/23/2022 showing EF 30-35%  - LV EF 30-35% on TTE 12/19/16.  - LV EF 40-45% on TTE 1/31/17.  - LV EF 40% on KEVIN 11/6/17.  - LV EF 50-55% on TTE 3/22/18.  - LV EF 30-35% on TTE 9/23/22.  - NYHA class II, ACC stage C.  - Euvolemic and compensated. - Continue GDMT including Toprol XL,Lasix, Entresto and Tradjenta     5. Nonsustained ventricular tachycardia  - Noted per previous interrogation.  - Brief  - Asymptomatic.  - On Toprol XL     6. Hypertension  - Well controlled. - Toprol XL,Lasix and Entresto    7. Hyperlipidemia  - On Lipitor and Fenofibric acid    8. Diabetes mellitus  -On Glucophage, Tradjenta, Glucotrol, Jardiance and Lantus     9. CARLO  - Continual daily compliance with CPAP. 10. Obesity  - Recommend weight loss  There is no height or weight on file to calculate BMI. 11. BPH  - On Flomax     12. Vitamin D deficiency  - On Vitamins D supplement. Recommendations: Will increase Tikosyn to 250 mcg every 12 hours today. ECG 2 hrs after each dose of Tikosyn. Daily Mg and BMP. Keep potassium > 4 and magnesium > 2  Telemetry. Will increase Toprol XL to 150 mg bid. Continue Xarelto 20 mg daily  If the patient remains in AF after his 4th dose of Tikosyn, will plan for electrical CV. Thank you for allowing me to participate in your patient's care. Please call me if there are any questions or concerns. I have spent a total of 75 minutes with the patient and the family reviewing the above stated recommendations.   And a total of >50% of that time involved face-to-face time providing counseling and/or coordination of care with the other providers, reviewing records/tests, counseling/education of the patient, ordering medications/tests/procedures, coordinating care, and documenting clinical information in the EHR.      Iris Wolfe MD  Cardiac Electrophysiology  [facility]) Physicians  [facility] [place] [place] [facility]  1:27 PM  1/11/2023

## 2023-01-11 NOTE — PROGRESS NOTES
Spoke with Tai Ricardo NP about pt heart rate jumping from 100 - 150's. Pt heart rate slowed to 's.  stated that pt could receive metoprolol an hour early for his evening dose.

## 2023-01-11 NOTE — PROGRESS NOTES
Per [facility] approved formulary policy, SGLT2 Inhibitors are not stocked or supplied for our inpatients. SGLT2s are only formulary with the indication of CKD or CHF therefore:    Please note that the  Empagliflozin (Jardiance) is non-formulary and has been discontinued while inpatient. If you feel the patient needs to continue their home therapy during the inpatient stay, the patient may bring their medication bottle for verification and administration pursuant to our home medication use policy. You may continue this at discharge. Please contact the pharmacy with any questions or concerns. Thank you.   Ángel Bentley, [facility], RPh/PharmD 1/11/2023 2:38 PM

## 2023-01-12 LAB
ANION GAP SERPL CALCULATED.3IONS-SCNC: 10 MMOL/L (ref 7–16)
BUN BLDV-MCNC: 33 MG/DL (ref 6–23)
CALCIUM SERPL-MCNC: 8.8 MG/DL (ref 8.6–10.2)
CHLORIDE BLD-SCNC: 107 MMOL/L (ref 98–107)
CO2: 26 MMOL/L (ref 22–29)
CREAT SERPL-MCNC: 1.3 MG/DL (ref 0.7–1.2)
EKG ATRIAL RATE: 108 BPM
EKG ATRIAL RATE: 94 BPM
EKG ATRIAL RATE: 97 BPM
EKG Q-T INTERVAL: 382 MS
EKG Q-T INTERVAL: 396 MS
EKG Q-T INTERVAL: 430 MS
EKG QRS DURATION: 104 MS
EKG QRS DURATION: 112 MS
EKG QRS DURATION: 114 MS
EKG QTC CALCULATION (BAZETT): 481 MS
EKG QTC CALCULATION (BAZETT): 493 MS
EKG QTC CALCULATION (BAZETT): 523 MS
EKG R AXIS: -61 DEGREES
EKG R AXIS: -63 DEGREES
EKG R AXIS: -66 DEGREES
EKG T AXIS: -125 DEGREES
EKG T AXIS: -63 DEGREES
EKG T AXIS: 126 DEGREES
EKG VENTRICULAR RATE: 113 BPM
EKG VENTRICULAR RATE: 79 BPM
EKG VENTRICULAR RATE: 89 BPM
GFR SERPL CREATININE-BSD FRML MDRD: 58 ML/MIN/1.73
GLUCOSE BLD-MCNC: 58 MG/DL (ref 74–99)
MAGNESIUM: 2.3 MG/DL (ref 1.6–2.6)
METER GLUCOSE: 101 MG/DL (ref 74–99)
METER GLUCOSE: 108 MG/DL (ref 74–99)
METER GLUCOSE: 61 MG/DL (ref 74–99)
METER GLUCOSE: 83 MG/DL (ref 74–99)
METER GLUCOSE: 91 MG/DL (ref 74–99)
POTASSIUM SERPL-SCNC: 3.9 MMOL/L (ref 3.5–5)
SODIUM BLD-SCNC: 143 MMOL/L (ref 132–146)

## 2023-01-12 PROCEDURE — 93010 ELECTROCARDIOGRAM REPORT: CPT | Performed by: INTERNAL MEDICINE

## 2023-01-12 PROCEDURE — 82962 GLUCOSE BLOOD TEST: CPT

## 2023-01-12 PROCEDURE — 80048 BASIC METABOLIC PNL TOTAL CA: CPT

## 2023-01-12 PROCEDURE — 83735 ASSAY OF MAGNESIUM: CPT

## 2023-01-12 PROCEDURE — 2580000003 HC RX 258: Performed by: INTERNAL MEDICINE

## 2023-01-12 PROCEDURE — 36415 COLL VENOUS BLD VENIPUNCTURE: CPT

## 2023-01-12 PROCEDURE — 2140000000 HC CCU INTERMEDIATE R&B

## 2023-01-12 PROCEDURE — 6370000000 HC RX 637 (ALT 250 FOR IP): Performed by: INTERNAL MEDICINE

## 2023-01-12 PROCEDURE — 93005 ELECTROCARDIOGRAM TRACING: CPT | Performed by: INTERNAL MEDICINE

## 2023-01-12 PROCEDURE — 99233 SBSQ HOSP IP/OBS HIGH 50: CPT | Performed by: INTERNAL MEDICINE

## 2023-01-12 RX ORDER — DOFETILIDE 0.25 MG/1
250 CAPSULE ORAL EVERY 12 HOURS SCHEDULED
Status: DISCONTINUED | OUTPATIENT
Start: 2023-01-13 | End: 2023-01-12

## 2023-01-12 RX ORDER — POTASSIUM CHLORIDE 20 MEQ/1
20 TABLET, EXTENDED RELEASE ORAL ONCE
Status: COMPLETED | OUTPATIENT
Start: 2023-01-12 | End: 2023-01-12

## 2023-01-12 RX ORDER — DOFETILIDE 0.25 MG/1
500 CAPSULE ORAL EVERY 12 HOURS SCHEDULED
Status: DISCONTINUED | OUTPATIENT
Start: 2023-01-12 | End: 2023-01-12

## 2023-01-12 RX ADMIN — FUROSEMIDE 20 MG: 20 TABLET ORAL at 08:46

## 2023-01-12 RX ADMIN — POTASSIUM CHLORIDE 20 MEQ: 1500 TABLET, EXTENDED RELEASE ORAL at 14:21

## 2023-01-12 RX ADMIN — DOFETILIDE 500 MCG: 0.25 CAPSULE ORAL at 18:57

## 2023-01-12 RX ADMIN — Medication 125 MG: at 08:48

## 2023-01-12 RX ADMIN — SACUBITRIL AND VALSARTAN 1 TABLET: 49; 51 TABLET, FILM COATED ORAL at 20:50

## 2023-01-12 RX ADMIN — MAGNESIUM OXIDE 400 MG (241.3 MG MAGNESIUM) TABLET 400 MG: TABLET at 08:46

## 2023-01-12 RX ADMIN — METOPROLOL SUCCINATE 150 MG: 100 TABLET, EXTENDED RELEASE ORAL at 08:47

## 2023-01-12 RX ADMIN — SODIUM CHLORIDE, PRESERVATIVE FREE 10 ML: 5 INJECTION INTRAVENOUS at 20:51

## 2023-01-12 RX ADMIN — METOPROLOL SUCCINATE 150 MG: 100 TABLET, EXTENDED RELEASE ORAL at 20:50

## 2023-01-12 RX ADMIN — SACUBITRIL AND VALSARTAN 1 TABLET: 49; 51 TABLET, FILM COATED ORAL at 08:46

## 2023-01-12 RX ADMIN — METFORMIN HYDROCHLORIDE 1000 MG: 500 TABLET ORAL at 17:28

## 2023-01-12 RX ADMIN — FENOFIBRATE 160 MG: 160 TABLET ORAL at 08:46

## 2023-01-12 RX ADMIN — TAMSULOSIN HYDROCHLORIDE 0.4 MG: 0.4 CAPSULE ORAL at 08:47

## 2023-01-12 RX ADMIN — RIVAROXABAN 20 MG: 20 TABLET, FILM COATED ORAL at 09:24

## 2023-01-12 RX ADMIN — DOFETILIDE 375 MCG: 0.12 CAPSULE ORAL at 06:59

## 2023-01-12 RX ADMIN — SODIUM CHLORIDE, PRESERVATIVE FREE 10 ML: 5 INJECTION INTRAVENOUS at 08:54

## 2023-01-12 RX ADMIN — METFORMIN HYDROCHLORIDE 1000 MG: 500 TABLET ORAL at 08:46

## 2023-01-12 RX ADMIN — ALOGLIPTIN 25 MG: 25 TABLET, FILM COATED ORAL at 09:24

## 2023-01-12 RX ADMIN — ATORVASTATIN CALCIUM 40 MG: 40 TABLET, FILM COATED ORAL at 08:46

## 2023-01-12 ASSESSMENT — PAIN SCALES - GENERAL: PAINLEVEL_OUTOF10: 0

## 2023-01-12 NOTE — CARE COORDINATION
Spoke with Pt abot Transition Plan of Care. Pt lives alone with  steps to enter. Pt is independent and drives. Pt uses no DME. PCP: Dr. Rodriguez Oden. Pharmacy: [facility]. Mail-in. Drove self to hospital with Dr permission. Pt will drive self home on Saturday. Discharge plan is to return home with no needs. SW/CM to follow for discharge needs.    Janice Kyle, L.S.W.  [phone]

## 2023-01-12 NOTE — PROGRESS NOTES
[address] [facility] Electrophysiology  Inpatient progress note   Ricky Trejo  1950  Date of Service: 1/12/2023  PCP: Khurram Burrell MD  Cardiologist: Dr Ruchi Daniel   Electrophysiologist:  Clarance Brittle, MD        Subjective: Ricky Trejo is seen for follow-up and management of atrial fibrillation and pacemaker in situ. The patient last DC-cardioversion on 9/2/22. The patient was found to be in 100% AF since 9/12/22 on remote interrogation 12/23/22. The patient was advised Tikosyn dose up titration versus Amiodarone therapy. He opted to come in for discussion today. His echocardiogram on 9/23/22 showed LV EF of 30-35%. His Lisinopril was changed to [place] at the end of September of 2022. Nuclear stress test on 12/2/22 showed no ischemia and LV EF could not be calculated. The patient reports feeling tired easily and can not walk long distance like he used to when he is in AF. The patient denies any chest pain, dyspnea, palpitations, dizziness, syncope, orthopnea or paroxysmal nocturnal dyspnea. He presents today in AF with HR of 101 bpm. Discussed with him options of rhythm control treatment including Tikosyn dose adjustment versus switch to Amiodarone versus redo ablation. He opts for Tikosyn dose up titration first.     1/11/23: The patient is seen in the hospital for Tikosyn up titration and possible DC-cardioversion. 1/12/23: patient lying in bed in no acute distress, he feels overall good. Heart rates better controlled today in afib. Tolertating tikosyn.  Will plan for cardioversion tomorrow     Patient Active Problem List   Diagnosis    Persistent atrial fibrillation ([facility] [address]    Type 2 diabetes mellitus with stage 3a chronic kidney disease, with long-term current use of insulin (HCC)    Moderate obesity    LV dysfunction    Nonischemic cardiomyopathy (HCC)    Chronic combined systolic and diastolic congestive heart failure ([address]    Obstructive sleep apnea    Mixed hyperlipidemia    Typical atrial flutter (HCC)    Paroxysmal atrial fibrillation (HCC)    Tachy-vaughn syndrome (HCC)    Sinus node dysfunction (HCC)    Status post catheter ablation of atrial fibrillation    Cardiac pacemaker in situ    Actinic keratosis    Benign hypertensive renal disease    Changes in skin texture    Epidermoid cyst of skin    Erectile dysfunction    Essential hypertension    Fatigue    Hyperkalemia    Hypertrophic condition of skin    Inflamed seborrheic keratosis    Lichenification and lichen simplex chronicus    Medical care complication    Pure hypercholesterolemia       Current Facility-Administered Medications   Medication Dose Route Frequency Provider Last Rate Last Admin    tamsulosin (FLOMAX) capsule 0.4 mg  0.4 mg Oral Daily Chadd Matta MD   0.4 mg at 01/12/23 0847    fenofibrate (TRIGLIDE) tablet 160 mg  160 mg Oral Daily Chadd Matta MD   160 mg at 01/12/23 0846    atorvastatin (LIPITOR) tablet 40 mg  40 mg Oral Daily Earl Roth MD   40 mg at 01/12/23 0846    glipiZIDE (GLUCOTROL) tablet 10 mg  10 mg Oral BID AC Earl Roth MD   10 mg at 01/11/23 1752    insulin glargine-yfgn (SEMGLEE-YFGN) injection vial 24 Units  24 Units SubCUTAneous Nightly Earl Roth MD        alogliptin (NESINA) tablet 25 mg  25 mg Oral Daily Earl Roth MD   25 mg at 01/12/23 6875    metFORMIN (GLUCOPHAGE) tablet 1,000 mg  1,000 mg Oral BID WC Earl Roth MD   1,000 mg at 01/12/23 0846    magnesium oxide (MAG-OX) tablet 400 mg  400 mg Oral Daily Chadd Matta MD   400 mg at 01/12/23 0846    tadalafil (CIALIS) tablet 5 mg (Patient Supplied)  5 mg Oral Daily Earl Roth MD        [START ON 1/18/2023] vitamin D (ERGOCALCIFEROL) capsule 50,000 Units  50,000 Units Oral Weekly Earl Roth MD        vitamin C tablet 125 mg  125 mg Oral Daily Earl Roth MD   125 mg at 01/12/23 0848    rivaroxaban (XARELTO) tablet 20 mg  20 mg Oral Daily with breakfast Roc Lawson MD   20 mg at 01/12/23 0924    sacubitril-valsartan (ENTRESTO) 49-51 MG per tablet 1 tablet  1 tablet Oral BID Roc Lawson MD   1 tablet at 01/12/23 0846    furosemide (LASIX) tablet 20 mg  20 mg Oral Daily Roc Lawson MD   20 mg at 01/12/23 0846    metoprolol succinate (TOPROL XL) extended release tablet 150 mg  150 mg Oral BID Roc Lawson MD   150 mg at 01/12/23 0847    sodium chloride flush 0.9 % injection 5-40 mL  5-40 mL IntraVENous 2 times per day Roc Lawson MD   10 mL at 01/12/23 0854    sodium chloride flush 0.9 % injection 5-40 mL  5-40 mL IntraVENous PRN Roc Lawson MD        0.9 % sodium chloride infusion   IntraVENous PRN Roc Lawson MD        acetaminophen (TYLENOL) tablet 650 mg  650 mg Oral Q6H PRN Chadd Matta MD        Or    acetaminophen (TYLENOL) suppository 650 mg  650 mg Rectal Q6H PRN Chadd Matta MD        senna (SENOKOT) tablet 8.6 mg  1 tablet Oral Daily PRN Chadd Matta MD        empagliflozin (JARDIANCE) tablet 25 mg  25 mg Oral Daily Roc Lawson MD   25 mg at 01/12/23 0846    dofetilide (TIKOSYN) capsule 375 mcg  375 mcg Oral 2 times per day Roc Lawson MD   375 mcg at 01/12/23 0659        No Known Allergies    ROS:   Constitutional: Negative for fever, activity change and appetite change. HENT: Negative for epistaxis. Eyes: Negative for diploplia, blurred vision. Respiratory: Negative for cough, chest tightness, shortness of breath and wheezing. Cardiovascular: pertinent positives in HPI  Gastrointestinal: Negative for abdominal pain and blood in stool.    All other review of systems are negative     PHYSICAL EXAM:     Vitals:    01/11/23 2339 01/12/23 0340 01/12/23 0726 01/12/23 1106   BP: 109/77 112/70 128/75 100/72   Pulse: 65 75 71 76   Resp: 16 18 19 17   Temp: 97.5 °F (36.4 °C) 98 °F (36.7 °C) 96.9 °F (36.1 °C) (!) 96.6 °F (35.9 °C)   TempSrc: Temporal Temporal Temporal Temporal   SpO2: 99% 100% 98% 98%   Weight:       Height:       Constitutional: well-developed, no acute distress  Eyes: conjunctivae normal, no xanthelasma   Ears, Nose, Throat: oral mucosa moist, no cyanosis   CV: no JVD. Irregularly irregular rate and rhythm. No murmurs, rubs, or gallops. PMI is nondisplaced  Lungs: clear to auscultation bilaterally, normal respiratory effort without used of accessory muscles  Abdomen: soft, non-tender, bowel sounds present, no masses or hepatomegaly   Musculoskeletal: no digital clubbing, no edema   Skin: warm, no rashes   Pacemaker site: well healed. No erosion, infection or migration    Data:    Recent Labs     01/11/23  1330   WBC 5.8   HGB 14.7   HCT 44.4        Recent Labs     01/11/23  1330 01/12/23  0454    143   K 4.8 3.9    107   CO2 25 26   BUN 32* 33*   CREATININE 1.2 1.3*   CALCIUM 9.2 8.8     Lab Results   Component Value Date/Time    MG 2.3 01/12/2023 04:54 AM     No results for input(s): TSH in the last 72 hours. No results for input(s): INR in the last 72 hours. Tele: Afib with intermittent v pacing       EKG 01/12/23 : Atrial fibrillation rate of 79bpm , QTc 493 ms    please see scan in Cardiology. Echocardiogram: 9/23/2022  Summary   Mildly dilated left ventricle. Borderline concentric left ventricular hypertrophy. No regional wall motion abnormalities seen with global hypokinesis. Moderate to severely reduced left ventricular systolic function. Borderline dilated right ventricle. Right ventricle global systolic function is reduced . Mildly enlarged right atrium size. Mild centrally directed mitral regurgitation is present. Mild tricuspid regurgitation. Nuclear stress test 12/2/22:  FINDINGS: The overall quality of the study was good. Left ventricular cavity size was noted to be enlarged on both rest and stress studies.      Rotational analog analysis demonstrated no patient motion or abnormal extracardiac radioactivity. The gated SPECT stress imaging in the short, vertical long, and horizontal long axis demonstrated normal homogeneous tracer distribution throughout the myocardium both on the post regadenoson and resting images. Gated SPECT left ventricular ejection fraction unable to be calculated on the basis of atrial fibrillation with normal myocardial thickening and wall motion and no regional wall motion abnormalities and moderate left ventricular systolic dysfunction . Impression:    Electrocardiographically normal regadenoson infusion with a clinically nonischemic response. Myocardial perfusion imaging was normal.    Overall left ventricular systolic function was  moderately impaired with a severely dilated left ventricular chamber and global left ventricular systolic dysfunction . 4. Intermediate risk pharmacologic stress test     Thank you for sending your patient to this [employer]. Electronically signed by Dianelys Stiles MD on 12/2/22      Device Interrogation 1/3/23:  Underlying rhythm: AF Vs  Mode: MVPR 60 -120  Battery Voltage/Longevity: 2.99 V, 10 years  Pacing: A: 0.1%  RV: 15.1%   P wave: 1.3 mV  Impedance: 494 ohms   Threshold: AF  RV R wave: 7.5 mV  Impedance: 627 ohms   Threshold: 0.5 V @0.4 ms  Episodes: 100% atrial fibrillation burden since 9/2022  Reprogramming included: none  Overall device function is normal    All device programmable settings were evaluated per above and in the scanned document, along with iterative adjustments (capture thresholds) to assess and select the most appropriate final programming to provide for consistent delivery of the appropriate therapy and to verify function of the device. Assessment and plan:    1.  Persistent atrial fibrillation  - Symptomatic in the past.  - Diagnosed in 7/2016.  - [id number] of 4 on Xarelto for stroke risk reduction  - KEVIN/DC-CV and Tikosyn initiation in 1/2017.   - PVI + LA + RA rotor, SVC isolation, CTI flutter and LA roof linear ablation in 11/2017  - Dofetilide stopping in 6/2018 with recurrent persistent AF in 9/2018. - Tikosyn initiation in 12/2018. - Status post pacemaker insertion 12/6/18 due to SND.  - Noted to have recurrent AF since 6/11/22; cardioverted 6/7/2022 with recurrence 4 days later.    - Ongoing atrial fibrillation since 6/11/2022 and had repeat cardioversion 9/2/2022 with recurrent atrial fibrillation since 9/12/22. - Presents in AF with stable QTc.  - CrCl by IBW of 60.25 mL/min based on Cr of 1.34 on 11/7/22. - Options of treatment discussed including up titration of Tikosyn versus switch to Amiodarone versus redo AF ablation. He opts for  for Tikosyn dose up titration first.      2. Tachy-vaughn syndrome  - Sinus node dysfunction with sinus arrest >5 seconds and need for Beta-blocker and Dofetilide therapy for rapid AF  - Status post pacemaker insertion on 12/6/18     3. Pacemaker  in situ  - Muse; dual chamber  - Indication: SND  - DOI 12/6/18.  - Normal device function. 4. Chronic systolic CHF and presumed nonischemic  - Recovered LV EF in the past, now depressed ejection fraction again with echo on 9/23/2022 showing EF 30-35%  - LV EF 30-35% on TTE 12/19/16.  - LV EF 40-45% on TTE 1/31/17.  - LV EF 40% on KEVIN 11/6/17.  - LV EF 50-55% on TTE 3/22/18.  - LV EF 30-35% on TTE 9/23/22.  - NYHA class II, ACC stage C.  - Euvolemic and compensated. - Continue GDMT including Toprol XL,Lasix, Entresto and Tradjenta     5. Nonsustained ventricular tachycardia  - Noted per previous interrogation.  - Brief  - Asymptomatic.  - On Toprol XL     6. Hypertension  - Well controlled. - Toprol XL,Lasix and Entresto    7. Hyperlipidemia  - On Lipitor and Fenofibric acid    8.  Diabetes mellitus  -On Glucophage, Tradjenta, Glucotrol, Jardiance and Lantus     9. CARLO  - Continual daily compliance with CPAP. 10. Obesity  - Recommend weight loss  Body mass index is 35.19 kg/m². 11. BPH  - On Flomax     12. Vitamin D deficiency  - On Vitamins D supplement. Recommendations:      Continue Tikosyn to 375 mcg every 12 hours today. ECG 2 hrs after each dose of Tikosyn. Daily Mg and BMP. Keep potassium > 4 and magnesium > 2  Telemetry. Continue Toprol XL to 150 mg bid. Continue Xarelto 20 mg daily  Plan for cardioversion tomorrow, NPO after midnight    Thank you for allowing me to participate in your patient's care. Please call me if there are any questions or concerns. DEIDRE Mckeon - CNP  Cardiac Electrophysiology  [facility]) Physicians  [facility] [place]: [facility]  12:29 PM  1/12/2023    Attending Physician's Statement    Patient seen with the ANP. Agree with the findings, assessment and plan. Management plan was discussed. I have personally interviewed the patient, independently performed a focused cardiac examination, reviewed the pertinent laboratory and diagnostic testing with the patient and directly participated in the medical decision-making as noted above with the following additions:     Feeling well. Remains in AF with CVR. QTc was 459 ms by manual measurement this AM     Physical exam showed no JVD, IRIR, no murmur, clear lungs bilaterally, no edema    Will increase Tikosyn to 500 mcg every 12 hours. Continue current dose of ToproL XL and Xarelto. Plan for DC-cardioversion tomorrow. NPO after midnight. I have spent a total of 50 minutes with the patient and the family reviewing the above stated recommendations.   And a total of >50% of that time involved face-to-face time providing counseling and/or coordination of care with the other providers, reviewing records/tests, counseling/education of the patient, ordering medications/tests/procedures, coordinating care, and documenting clinical information in the EHR.      Winifred Story MD  Cardiac Electrophysiology  [facility]) Physicians  [facility] [place]: [facility]  6:53 PM  1/12/2023

## 2023-01-12 NOTE — DISCHARGE INSTR - COC
Continuity of Care Form    Patient Name: Helen Ulloa   :  1950  MRN:  [id number]    Admit date:  2023  Discharge date:  ***    Code Status Order: Full Code   Advance Directives:     Admitting Physician:  Roc Lawson MD  PCP: Karley Zimmerman MD    Discharging Nurse: [facility] Unit/Room#: 0808/0881-N  Discharging Unit Phone Number: ***    Emergency Contact:   Extended Emergency Contact Information  Primary Emergency Contact: Sherron Funez, [address] [place] [address] Phone: [phone]  Relation: Child    Past Surgical History:  Past Surgical History:   Procedure Laterality Date    ATRIAL ABLATION SURGERY  2017    atrial fib    CARDIOVERSION  2016    CARDIOVERSION  11/15/2016    DCCV    CARDIOVERSION  2017    Dr. Vasu Medina  2017    CARDIOVERSION  2022    Successful (Dr. Rubia Bhatt)    [facility]  2018    D-PPM  ([employer])  Amy Sicard    ROTATOR CUFF REPAIR Right 2015    TRANSESOPHAGEAL ECHOCARDIOGRAM  2017       Immunization History:   Immunization History   Administered Date(s) Administered    COVID-19, J&J, (age 18y+), IM, 0.5 mL 2021    COVID-19, MODERNA BLUE border, Primary or Immunocompromised, (age 12y+), IM, 100 mcg/0.5mL 2021    COVID-19, MODERNA Booster BLUE border, (age 18y+), IM, 50mcg/0.25mL 2021    COVID-19, US Vaccine, Vaccine Unspecified 2022    Influenza A ([id number]) Vaccine PF IM 2010    Influenza Virus Vaccine 2018    Influenza, FLUAD, (age 72 y+), Adjuvanted, 0.5mL 2021    Influenza, FLUARIX, FLULAVAL, FLUZONE (age 10 mo+) AND AFLURIA, (age 1 y+), PF, 0.5mL 2017, 10/10/2022    Influenza, FLUZONE (age 72 y+), High Dose, 0.7mL 2020    Influenza, High Dose (Fluzone 65 yrs and older) 2016, 10/19/2018, 2019, 2020    Pneumococcal Conjugate 13-valent (Douglas Salazar) 09/12/2016    Pneumococcal Polysaccharide ([id number]) 11/01/2019       Active Problems:  Patient Active Problem List   Diagnosis Code    Persistent atrial fibrillation (HCC) I48.19    Type 2 diabetes mellitus with stage 3a chronic kidney disease, with long-term current use of insulin (HCC) E11.22, N18.31, Z79.4    Moderate obesity E66.8    LV dysfunction I51.9    Nonischemic cardiomyopathy ([facility] [address] I42.8    Chronic combined systolic and diastolic congestive heart failure (HCC) I50.42    Obstructive sleep apnea G47.33    Mixed hyperlipidemia E78.2    Typical atrial flutter (HCC) I48.3    Paroxysmal atrial fibrillation (HCC) I48.0    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    Sinus node dysfunction (HCC) I49.5    Status post catheter ablation of atrial fibrillation Z98.890    Cardiac pacemaker in situ Z95.0    Actinic keratosis L57.0    Benign hypertensive renal disease I12.9    Changes in skin texture R23.4    Epidermoid cyst of skin L72.0    Erectile dysfunction N52.9    Essential hypertension I10    Fatigue R53.83    Hyperkalemia E87.5    Hypertrophic condition of skin L91.9    Inflamed seborrheic keratosis G50.6    Lichenification and lichen simplex chronicus L28.0    Medical care complication I68. 9XXA    Pure hypercholesterolemia E78.00       Isolation/Infection:   Isolation            No Isolation          Patient Infection Status       None to display            Nurse Assessment:  Last Vital Signs: /72   Pulse 76   Temp (!) 96.6 °F (35.9 °C) (Temporal)   Resp 17   Ht 6' 4\" (1.93 m)   Wt 289 lb 1.6 oz (131.1 kg)   SpO2 98%   BMI 35.19 kg/m²     Last documented pain score (0-10 scale): Pain Level: 0  Last Weight:   Wt Readings from Last 1 Encounters:   01/11/23 289 lb 1.6 oz (131.1 kg)     Mental Status:  {IP PT MENTAL STATUS:48413}    IV Access:  {[facility] IV ACCESS:294774018}    Nursing Mobility/ADLs:  Walking   {[facility] DME IJGZ:005088950}  Transfer  {[facility] WAAS:937263945}  Bathing  {[facility] RJIO:930898364}  Dressing  {[facility] DME UFCU:747763648}  Toileting  {[facility] DME IRXX:744312544}  Feeding  {[facility] DME ZHIX:568575360}  Med Admin  {[facility] DME UXWY:716598212}  Med Delivery   { [facility] MED Delivery:935849641}    Wound Care Documentation and Therapy:  Incision 18 Chest Left (Active)   Number of days: 1497        Elimination:  Continence: Bowel: {YES / PM:15005}  Bladder: {YES / AL:95730}  Urinary Catheter: {Urinary Catheter:677006882}   Colostomy/Ileostomy/Ileal Conduit: {YES / }       Date of Last BM: ***    Intake/Output Summary (Last 24 hours) at 2023 1107  Last data filed at 2023 0952  Gross per 24 hour   Intake 480 ml   Output --   Net 480 ml     I/O last 3 completed shifts:   In: 120 [P.O.:120]  Out: -     Safety Concerns:     [address] Safety Concerns:906207888}    Impairments/Disabilities:      [address] Impairments/Disabilities:147528410}    Nutrition Therapy:  Current Nutrition Therapy:   [address] [employer] Diet List:760906755}    Routes of Feeding: {[facility] DME Other Feedings:835690341}  Liquids: {Slp liquid thickness:89364}  Daily Fluid Restriction: {[facility] DME Yes amt example:206406254}  Last Modified Barium Swallow with Video (Video Swallowing Test): {Done Not Done BKIO:187121436}    Treatments at the Time of Hospital Discharge:   Respiratory Treatments: ***  Oxygen Therapy:  {Therapy; copd oxygen:41959}  Ventilator:    { CC Vent JWAD:550634602}    Rehab Therapies: {THERAPEUTIC INTERVENTION:4746027576}  Weight Bearing Status/Restrictions: [address] 3Touch Weight Bearin}  Other Medical Equipment (for information only, NOT a DME order):  {EQUIPMENT:114171742}  Other Treatments: ***    Patient's personal belongings (please select all that are sent with patient):  {[facility] DME Belongings:818628220}    RN SIGNATURE:  {Esignature:418020572}    CASE MANAGEMENT/SOCIAL WORK SECTION    Inpatient Status Date: ***    Readmission Risk Assessment Score:  Readmission Risk              Risk of Unplanned Readmission: 14           Discharging to Facility/ Agency   Name:   Address:  Phone:  Fax:    Dialysis Facility (if applicable)   Name:  Address:  Dialysis Schedule:  Phone:  Fax:    / signature: {Esignature:253704895}    PHYSICIAN SECTION    Prognosis: {Prognosis:3876293561}    Condition at Discharge: Shai Sanchez Patient Condition:084360161}    Rehab Potential (if transferring to Rehab): {Prognosis:5835240231}    Recommended Labs or Other Treatments After Discharge: ***    Physician Certification: I certify the above information and transfer of Chandan Munoz  is necessary for the continuing treatment of the diagnosis listed and that he requires {Admit to Appropriate Level of Care:92826} for {GREATER/LESS:170638673} 30 days.      Update Admission H&P: {[facility] DME Changes in QZFUQ:838765648}    PHYSICIAN SIGNATURE:  {Esignature:055208873}

## 2023-01-12 NOTE — PLAN OF CARE
Problem: Chronic Conditions and Co-morbidities  Goal: Patient's chronic conditions and co-morbidity symptoms are monitored and maintained or improved  1/11/2023 2247 by Kiko Fraga RN  Outcome: Progressing     Problem: Discharge Planning  Goal: Discharge to home or other facility with appropriate resources  1/11/2023 2247 by Kiko Fraga RN  Outcome: Progressing     Problem: Safety - Adult  Goal: Free from fall injury  1/11/2023 2247 by Kiko Fraga RN  Outcome: Progressing     Problem: Pain  Goal: Verbalizes/displays adequate comfort level or baseline comfort level  1/11/2023 2247 by Kiko Fraga RN  Outcome: Progressing

## 2023-01-12 NOTE — PATIENT CARE CONFERENCE
P Quality Flow/Interdisciplinary Rounds Progress Note        Quality Flow Rounds held on January 12, 2023    Disciplines Attending:  Bedside Nurse, , , and Nursing Unit Leadership    Jackeline Toure was admitted on 1/11/2023 12:42 PM    Anticipated Discharge Date:       Disposition:    Wes Score:  Wes Scale Score: 22    Readmission Risk              Risk of Unplanned Readmission:  14           Discussed patient goal for the day, patient clinical progression, and barriers to discharge.   The following Goal(s) of the Day/Commitment(s) have been identified:  Diagnostics - Report Results      Clay Beavers RN  January 12, 2023

## 2023-01-13 ENCOUNTER — APPOINTMENT (OUTPATIENT)
Dept: CARDIAC CATH/INVASIVE PROCEDURES | Age: 73
End: 2023-01-13
Attending: INTERNAL MEDICINE
Payer: MEDICARE

## 2023-01-13 ENCOUNTER — ANESTHESIA (OUTPATIENT)
Dept: CARDIAC CATH/INVASIVE PROCEDURES | Age: 73
End: 2023-01-13
Payer: MEDICARE

## 2023-01-13 ENCOUNTER — ANESTHESIA EVENT (OUTPATIENT)
Dept: CARDIAC CATH/INVASIVE PROCEDURES | Age: 73
End: 2023-01-13
Payer: MEDICARE

## 2023-01-13 LAB
ANION GAP SERPL CALCULATED.3IONS-SCNC: 13 MMOL/L (ref 7–16)
BUN BLDV-MCNC: 27 MG/DL (ref 6–23)
CALCIUM SERPL-MCNC: 8.8 MG/DL (ref 8.6–10.2)
CHLORIDE BLD-SCNC: 108 MMOL/L (ref 98–107)
CO2: 23 MMOL/L (ref 22–29)
CREAT SERPL-MCNC: 1.2 MG/DL (ref 0.7–1.2)
EKG ATRIAL RATE: 37 BPM
EKG ATRIAL RATE: 67 BPM
EKG ATRIAL RATE: 71 BPM
EKG P AXIS: -70 DEGREES
EKG P-R INTERVAL: 270 MS
EKG Q-T INTERVAL: 418 MS
EKG Q-T INTERVAL: 430 MS
EKG Q-T INTERVAL: 454 MS
EKG QRS DURATION: 114 MS
EKG QRS DURATION: 114 MS
EKG QRS DURATION: 118 MS
EKG QTC CALCULATION (BAZETT): 493 MS
EKG QTC CALCULATION (BAZETT): 493 MS
EKG QTC CALCULATION (BAZETT): 589 MS
EKG R AXIS: -59 DEGREES
EKG R AXIS: -67 DEGREES
EKG R AXIS: -74 DEGREES
EKG T AXIS: 135 DEGREES
EKG T AXIS: 148 DEGREES
EKG T AXIS: 176 DEGREES
EKG VENTRICULAR RATE: 113 BPM
EKG VENTRICULAR RATE: 71 BPM
EKG VENTRICULAR RATE: 84 BPM
GFR SERPL CREATININE-BSD FRML MDRD: >60 ML/MIN/1.73
GLUCOSE BLD-MCNC: 85 MG/DL (ref 74–99)
MAGNESIUM: 2.3 MG/DL (ref 1.6–2.6)
METER GLUCOSE: 116 MG/DL (ref 74–99)
METER GLUCOSE: 60 MG/DL (ref 74–99)
METER GLUCOSE: 78 MG/DL (ref 74–99)
METER GLUCOSE: 94 MG/DL (ref 74–99)
POTASSIUM SERPL-SCNC: 4.4 MMOL/L (ref 3.5–5)
SODIUM BLD-SCNC: 144 MMOL/L (ref 132–146)

## 2023-01-13 PROCEDURE — 6370000000 HC RX 637 (ALT 250 FOR IP): Performed by: INTERNAL MEDICINE

## 2023-01-13 PROCEDURE — 6360000002 HC RX W HCPCS: Performed by: NURSE ANESTHETIST, CERTIFIED REGISTERED

## 2023-01-13 PROCEDURE — 2580000003 HC RX 258: Performed by: INTERNAL MEDICINE

## 2023-01-13 PROCEDURE — 2580000003 HC RX 258: Performed by: NURSE ANESTHETIST, CERTIFIED REGISTERED

## 2023-01-13 PROCEDURE — 93010 ELECTROCARDIOGRAM REPORT: CPT | Performed by: INTERNAL MEDICINE

## 2023-01-13 PROCEDURE — 92960 CARDIOVERSION ELECTRIC EXT: CPT

## 2023-01-13 PROCEDURE — 93005 ELECTROCARDIOGRAM TRACING: CPT | Performed by: INTERNAL MEDICINE

## 2023-01-13 PROCEDURE — 3700000000 HC ANESTHESIA ATTENDED CARE

## 2023-01-13 PROCEDURE — 83735 ASSAY OF MAGNESIUM: CPT

## 2023-01-13 PROCEDURE — 36415 COLL VENOUS BLD VENIPUNCTURE: CPT

## 2023-01-13 PROCEDURE — 80048 BASIC METABOLIC PNL TOTAL CA: CPT

## 2023-01-13 PROCEDURE — 2709999900 HC NON-CHARGEABLE SUPPLY

## 2023-01-13 PROCEDURE — 93288 INTERROG EVL PM/LDLS PM IP: CPT | Performed by: INTERNAL MEDICINE

## 2023-01-13 PROCEDURE — 4B02XSZ MEASUREMENT OF CARDIAC PACEMAKER, EXTERNAL APPROACH: ICD-10-PCS | Performed by: INTERNAL MEDICINE

## 2023-01-13 PROCEDURE — 2140000000 HC CCU INTERMEDIATE R&B

## 2023-01-13 PROCEDURE — 82962 GLUCOSE BLOOD TEST: CPT

## 2023-01-13 PROCEDURE — 5A2204Z RESTORATION OF CARDIAC RHYTHM, SINGLE: ICD-10-PCS | Performed by: INTERNAL MEDICINE

## 2023-01-13 PROCEDURE — 92960 CARDIOVERSION ELECTRIC EXT: CPT | Performed by: INTERNAL MEDICINE

## 2023-01-13 RX ORDER — PROPOFOL 10 MG/ML
INJECTION, EMULSION INTRAVENOUS PRN
Status: DISCONTINUED | OUTPATIENT
Start: 2023-01-13 | End: 2023-01-13 | Stop reason: SDUPTHER

## 2023-01-13 RX ORDER — SODIUM CHLORIDE 9 MG/ML
INJECTION, SOLUTION INTRAVENOUS PRN
Status: DISCONTINUED | OUTPATIENT
Start: 2023-01-13 | End: 2023-01-14 | Stop reason: HOSPADM

## 2023-01-13 RX ORDER — DEXTROSE MONOHYDRATE 100 MG/ML
INJECTION, SOLUTION INTRAVENOUS CONTINUOUS PRN
Status: DISCONTINUED | OUTPATIENT
Start: 2023-01-13 | End: 2023-01-14 | Stop reason: HOSPADM

## 2023-01-13 RX ORDER — SODIUM CHLORIDE 0.9 % (FLUSH) 0.9 %
5-40 SYRINGE (ML) INJECTION PRN
Status: DISCONTINUED | OUTPATIENT
Start: 2023-01-13 | End: 2023-01-14 | Stop reason: HOSPADM

## 2023-01-13 RX ORDER — SODIUM CHLORIDE 0.9 % (FLUSH) 0.9 %
5-40 SYRINGE (ML) INJECTION EVERY 12 HOURS SCHEDULED
Status: DISCONTINUED | OUTPATIENT
Start: 2023-01-13 | End: 2023-01-14 | Stop reason: HOSPADM

## 2023-01-13 RX ORDER — SODIUM CHLORIDE 9 MG/ML
INJECTION, SOLUTION INTRAVENOUS CONTINUOUS PRN
Status: DISCONTINUED | OUTPATIENT
Start: 2023-01-13 | End: 2023-01-13 | Stop reason: SDUPTHER

## 2023-01-13 RX ORDER — DOFETILIDE 0.25 MG/1
250 CAPSULE ORAL EVERY 12 HOURS SCHEDULED
Status: DISCONTINUED | OUTPATIENT
Start: 2023-01-14 | End: 2023-01-14 | Stop reason: HOSPADM

## 2023-01-13 RX ADMIN — SACUBITRIL AND VALSARTAN 1 TABLET: 49; 51 TABLET, FILM COATED ORAL at 21:16

## 2023-01-13 RX ADMIN — SODIUM CHLORIDE: 9 INJECTION, SOLUTION INTRAVENOUS at 10:32

## 2023-01-13 RX ADMIN — PROPOFOL 60 MG: 10 INJECTION, EMULSION INTRAVENOUS at 10:35

## 2023-01-13 RX ADMIN — TAMSULOSIN HYDROCHLORIDE 0.4 MG: 0.4 CAPSULE ORAL at 07:54

## 2023-01-13 RX ADMIN — METFORMIN HYDROCHLORIDE 1000 MG: 500 TABLET ORAL at 07:54

## 2023-01-13 RX ADMIN — MAGNESIUM OXIDE 400 MG (241.3 MG MAGNESIUM) TABLET 400 MG: TABLET at 07:54

## 2023-01-13 RX ADMIN — ALOGLIPTIN 25 MG: 25 TABLET, FILM COATED ORAL at 07:55

## 2023-01-13 RX ADMIN — METOPROLOL SUCCINATE 150 MG: 100 TABLET, EXTENDED RELEASE ORAL at 21:16

## 2023-01-13 RX ADMIN — METOPROLOL SUCCINATE 150 MG: 100 TABLET, EXTENDED RELEASE ORAL at 07:54

## 2023-01-13 RX ADMIN — GLIPIZIDE 10 MG: 5 TABLET ORAL at 16:52

## 2023-01-13 RX ADMIN — SODIUM CHLORIDE, PRESERVATIVE FREE 10 ML: 5 INJECTION INTRAVENOUS at 21:17

## 2023-01-13 RX ADMIN — METFORMIN HYDROCHLORIDE 1000 MG: 500 TABLET ORAL at 16:52

## 2023-01-13 RX ADMIN — FUROSEMIDE 20 MG: 20 TABLET ORAL at 07:54

## 2023-01-13 RX ADMIN — DOFETILIDE 375 MCG: 0.25 CAPSULE ORAL at 18:57

## 2023-01-13 RX ADMIN — SODIUM CHLORIDE, PRESERVATIVE FREE 10 ML: 5 INJECTION INTRAVENOUS at 07:56

## 2023-01-13 RX ADMIN — FENOFIBRATE 160 MG: 160 TABLET ORAL at 07:55

## 2023-01-13 RX ADMIN — Medication 125 MG: at 07:56

## 2023-01-13 RX ADMIN — DOFETILIDE 375 MCG: 0.25 CAPSULE ORAL at 06:59

## 2023-01-13 RX ADMIN — SACUBITRIL AND VALSARTAN 1 TABLET: 49; 51 TABLET, FILM COATED ORAL at 07:55

## 2023-01-13 RX ADMIN — RIVAROXABAN 20 MG: 20 TABLET, FILM COATED ORAL at 07:55

## 2023-01-13 RX ADMIN — ATORVASTATIN CALCIUM 40 MG: 40 TABLET, FILM COATED ORAL at 07:54

## 2023-01-13 RX ADMIN — Medication 16 G: at 21:29

## 2023-01-13 ASSESSMENT — PAIN SCALES - GENERAL
PAINLEVEL_OUTOF10: 0
PAINLEVEL_OUTOF10: 0

## 2023-01-13 NOTE — PLAN OF CARE
Problem: Chronic Conditions and Co-morbidities  Goal: Patient's chronic conditions and co-morbidity symptoms are monitored and maintained or improved  1/12/2023 2253 by Itzel Martin RN  Outcome: Progressing     Problem: Discharge Planning  Goal: Discharge to home or other facility with appropriate resources  1/12/2023 2253 by Itzel Martin RN  Outcome: Progressing     Problem: Safety - Adult  Goal: Free from fall injury  1/12/2023 2253 by Itzel Martin RN  Outcome: Progressing     Problem: Pain  Goal: Verbalizes/displays adequate comfort level or baseline comfort level  1/12/2023 2253 by Itzel Martin RN  Outcome: Progressing

## 2023-01-13 NOTE — PLAN OF CARE
Patient's chart updated to reflect:      . - HF care plan, HF education points and HF discharge instructions.  -Orders: 2 gram sodium diet, daily weights, I/O.  -PCP and/or Cardiologist appointment to be scheduled within 7 days of hospital discharge.  -History of HF, not primary admission Dx.   Patient admitted for treatment of  Tikosyn titration  Yanni Kirby RN RN, BSN  Heart Failure Navigator

## 2023-01-13 NOTE — PATIENT CARE CONFERENCE
P Quality Flow/Interdisciplinary Rounds Progress Note        Quality Flow Rounds held on January 13, 2023    Disciplines Attending:  Bedside Nurse, , , and Nursing Unit Leadership    Geovany Alvarez was admitted on 1/11/2023 12:42 PM    Anticipated Discharge Date:       Disposition:    Wes Score:  Wes Scale Score: 22    Readmission Risk              Risk of Unplanned Readmission:  14           Discussed patient goal for the day, patient clinical progression, and barriers to discharge.   The following Goal(s) of the Day/Commitment(s) have been identified:  Diagnostics - Report Results      Toña Schmidt RN  January 13, 2023

## 2023-01-13 NOTE — CARE COORDINATION
Pt had his cardioversion  this morning. Discharge Plan is to return home with no needs after Tikosyn doses. Pending discharge for Saturday. Pt will drive himself home. JUAN PABLO/MAX to follow for discharge needs.    Jeannie Myrick, L.S.W.  [phone]

## 2023-01-13 NOTE — ANESTHESIA POSTPROCEDURE EVALUATION
Department of Anesthesiology  Postprocedure Note    Patient: Lisha Torres  MRN: [id number]  YOB: 1950  Date of evaluation: 1/13/2023      Procedure Summary     Date: 01/13/23 Room / Location: [facility] CATH LAB    Anesthesia Start: 9159 Anesthesia Stop: 1039    Procedure: CARDIOVERSION WITH ANESTHESIA Diagnosis:     Scheduled Providers:  Responsible Provider: Alie Patino MD    Anesthesia Type: MAC ASA Status: 3          Anesthesia Type: No value filed.     Brandon Phase I:      Brandon Phase II:        Anesthesia Post Evaluation    Patient location during evaluation: bedside (CVL)  Patient participation: complete - patient participated  Level of consciousness: awake and alert  Airway patency: patent  Nausea & Vomiting: no nausea and no vomiting  Complications: no  Cardiovascular status: hemodynamically stable  Respiratory status: acceptable  Hydration status: stable

## 2023-01-13 NOTE — PLAN OF CARE
Problem: Chronic Conditions and Co-morbidities  Goal: Patient's chronic conditions and co-morbidity symptoms are monitored and maintained or improved  Outcome: Progressing     Problem: Discharge Planning  Goal: Discharge to home or other facility with appropriate resources  Outcome: Progressing     Problem: Safety - Adult  Goal: Free from fall injury  Outcome: Progressing     Problem: Pain  Goal: Verbalizes/displays adequate comfort level or baseline comfort level  Outcome: Progressing     Problem: Cardiovascular - Adult  Goal: Maintains optimal cardiac output and hemodynamic stability  Outcome: Progressing     Problem: Metabolic/Fluid and Electrolytes - Adult  Goal: Hemodynamic stability and optimal renal function maintained  Outcome: Progressing     Problem: ABCDS Injury Assessment  Goal: Absence of physical injury  Outcome: Progressing

## 2023-01-13 NOTE — PROCEDURES
[facility]) Physicians- The [facility] [address] [place] [address] Electrophysiology  Procedure Report  PATIENT: Krystin Muller RECORD NUMBER: [id number]  DATE OF PROCEDURE:  1/13/2023  ATTENDING ELECTROPHYSIOLOGIST:  Major Phoenix, MD  REFERRING PHYSICIAN: Dr. Sofya Key:    1. Direct Current Electrical Cardioversion  2. Loulou procedure device interrogation    INDICATION: Persistent atrial fibrillation    PROCEDURE PERFORMED By: Major Phoenix, MD    PROCEDURE TIME: 30 minutes    COMPICATIONS: None immediately apparent    ANESTHESIA: LMAC    DESCRIPTION OF PROCEDURE: The risks, benefits, and alternatives to the procedure were discussed with the patient, and informed consent was obtained. The patient was brought to the cardiovascular lab and sedated under the guidance of anesthesia. Once anesthesia was deemed adequate, a 200 J biphasic synchronous shock was applied and successfully converted the rhythm back to normal sinus. The patient was then allowed to wake in the usual fashion. Comment: The patient was therapeutically anticoagulated for a minimum of 3 consecutive weeks prior to cardioversion. Device Interrogation:   Underlying rhythm: AF Vs  Mode: MVPR 60 -120  Battery Voltage/Longevity: 9.5  years  Pacing: A: 0.1%  RV: 16.1%   P wave: Paced Impedance: 418 ohms   Threshold: 0.25 V @0.4 ms  RV R wave: 6.1 mV  Impedance: 589 ohms   Threshold: 0.25 V @0.4 ms  Episodes: 100% atrial fibrillation burden since 9/2022  Reprogramming included: increase lower rate from 60 to 70 bpm  Overall device function is normal    All device programmable settings were evaluated per above and in the scanned document, along with iterative adjustments (capture thresholds) to assess and select the most appropriate final programming to provide for consistent delivery of the appropriate therapy and to verify function of the device. SUMMARY:  1.  Successful cardioversion of persistent atrial fibrillation to sinus rhythm. 2. Normal pacemaker function    RECOMMENDATIONS:  1. Continue Tikosyn 375 mcg every 12 hours. 2. Continue Toprol  mg bid. 3. Continue Xarelto 20 mg daily. 4. Plan to discharge home tomorrow if QTC is stable.     Omkar De Paz MD  Cardiac Electrophysiology  [address]  [facility] [place]: [facility]  9:47 AM  1/13/2023

## 2023-01-14 VITALS
DIASTOLIC BLOOD PRESSURE: 67 MMHG | RESPIRATION RATE: 18 BRPM | HEART RATE: 75 BPM | HEIGHT: 76 IN | OXYGEN SATURATION: 96 % | SYSTOLIC BLOOD PRESSURE: 128 MMHG | BODY MASS INDEX: 33.88 KG/M2 | WEIGHT: 278.2 LBS | TEMPERATURE: 97.2 F

## 2023-01-14 LAB
ANION GAP SERPL CALCULATED.3IONS-SCNC: 8 MMOL/L (ref 7–16)
BUN BLDV-MCNC: 22 MG/DL (ref 6–23)
CALCIUM SERPL-MCNC: 9.1 MG/DL (ref 8.6–10.2)
CHLORIDE BLD-SCNC: 103 MMOL/L (ref 98–107)
CO2: 29 MMOL/L (ref 22–29)
CREAT SERPL-MCNC: 1.3 MG/DL (ref 0.7–1.2)
EKG ATRIAL RATE: 70 BPM
EKG ATRIAL RATE: 71 BPM
EKG ATRIAL RATE: 72 BPM
EKG P-R INTERVAL: 272 MS
EKG P-R INTERVAL: 274 MS
EKG P-R INTERVAL: 280 MS
EKG Q-T INTERVAL: 448 MS
EKG Q-T INTERVAL: 462 MS
EKG Q-T INTERVAL: 484 MS
EKG QRS DURATION: 120 MS
EKG QRS DURATION: 122 MS
EKG QRS DURATION: 128 MS
EKG QTC CALCULATION (BAZETT): 486 MS
EKG QTC CALCULATION (BAZETT): 505 MS
EKG QTC CALCULATION (BAZETT): 522 MS
EKG R AXIS: -62 DEGREES
EKG R AXIS: -63 DEGREES
EKG R AXIS: -67 DEGREES
EKG T AXIS: 134 DEGREES
EKG T AXIS: 151 DEGREES
EKG T AXIS: 155 DEGREES
EKG VENTRICULAR RATE: 70 BPM
EKG VENTRICULAR RATE: 71 BPM
EKG VENTRICULAR RATE: 72 BPM
GFR SERPL CREATININE-BSD FRML MDRD: 58 ML/MIN/1.73
GLUCOSE BLD-MCNC: 67 MG/DL (ref 74–99)
MAGNESIUM: 2.2 MG/DL (ref 1.6–2.6)
METER GLUCOSE: 138 MG/DL (ref 74–99)
METER GLUCOSE: 65 MG/DL (ref 74–99)
POTASSIUM SERPL-SCNC: 4.7 MMOL/L (ref 3.5–5)
SODIUM BLD-SCNC: 140 MMOL/L (ref 132–146)

## 2023-01-14 PROCEDURE — 80048 BASIC METABOLIC PNL TOTAL CA: CPT

## 2023-01-14 PROCEDURE — 36415 COLL VENOUS BLD VENIPUNCTURE: CPT

## 2023-01-14 PROCEDURE — 93010 ELECTROCARDIOGRAM REPORT: CPT | Performed by: INTERNAL MEDICINE

## 2023-01-14 PROCEDURE — 82962 GLUCOSE BLOOD TEST: CPT

## 2023-01-14 PROCEDURE — 93005 ELECTROCARDIOGRAM TRACING: CPT | Performed by: INTERNAL MEDICINE

## 2023-01-14 PROCEDURE — 6370000000 HC RX 637 (ALT 250 FOR IP): Performed by: INTERNAL MEDICINE

## 2023-01-14 PROCEDURE — 83735 ASSAY OF MAGNESIUM: CPT

## 2023-01-14 PROCEDURE — 99239 HOSP IP/OBS DSCHRG MGMT >30: CPT | Performed by: INTERNAL MEDICINE

## 2023-01-14 PROCEDURE — 2580000003 HC RX 258: Performed by: INTERNAL MEDICINE

## 2023-01-14 RX ORDER — METOPROLOL SUCCINATE 50 MG/1
150 TABLET, EXTENDED RELEASE ORAL 2 TIMES DAILY
Qty: 540 TABLET | Refills: 3 | Status: SHIPPED | OUTPATIENT
Start: 2023-01-14

## 2023-01-14 RX ORDER — DOFETILIDE 0.25 MG/1
250 CAPSULE ORAL EVERY 12 HOURS SCHEDULED
Qty: 180 CAPSULE | Refills: 1 | Status: SHIPPED | OUTPATIENT
Start: 2023-01-14

## 2023-01-14 RX ADMIN — SODIUM CHLORIDE, PRESERVATIVE FREE 10 ML: 5 INJECTION INTRAVENOUS at 09:40

## 2023-01-14 RX ADMIN — TAMSULOSIN HYDROCHLORIDE 0.4 MG: 0.4 CAPSULE ORAL at 09:39

## 2023-01-14 RX ADMIN — FUROSEMIDE 20 MG: 20 TABLET ORAL at 09:39

## 2023-01-14 RX ADMIN — SODIUM CHLORIDE, PRESERVATIVE FREE 10 ML: 5 INJECTION INTRAVENOUS at 09:39

## 2023-01-14 RX ADMIN — MAGNESIUM OXIDE 400 MG (241.3 MG MAGNESIUM) TABLET 400 MG: TABLET at 09:38

## 2023-01-14 RX ADMIN — FENOFIBRATE 160 MG: 160 TABLET ORAL at 09:40

## 2023-01-14 RX ADMIN — RIVAROXABAN 20 MG: 20 TABLET, FILM COATED ORAL at 09:39

## 2023-01-14 RX ADMIN — METFORMIN HYDROCHLORIDE 1000 MG: 500 TABLET ORAL at 09:38

## 2023-01-14 RX ADMIN — Medication 125 MG: at 09:38

## 2023-01-14 RX ADMIN — METOPROLOL SUCCINATE 150 MG: 100 TABLET, EXTENDED RELEASE ORAL at 09:38

## 2023-01-14 RX ADMIN — SACUBITRIL AND VALSARTAN 1 TABLET: 49; 51 TABLET, FILM COATED ORAL at 09:38

## 2023-01-14 RX ADMIN — Medication 16 G: at 06:15

## 2023-01-14 RX ADMIN — ALOGLIPTIN 25 MG: 25 TABLET, FILM COATED ORAL at 09:39

## 2023-01-14 RX ADMIN — ATORVASTATIN CALCIUM 40 MG: 40 TABLET, FILM COATED ORAL at 09:39

## 2023-01-14 RX ADMIN — DOFETILIDE 250 MCG: 0.25 CAPSULE ORAL at 06:59

## 2023-01-14 ASSESSMENT — PAIN SCALES - GENERAL: PAINLEVEL_OUTOF10: 0

## 2023-01-14 NOTE — PLAN OF CARE
Problem: Chronic Conditions and Co-morbidities  Goal: Patient's chronic conditions and co-morbidity symptoms are monitored and maintained or improved  1/13/2023 2258 by Randall Coppola RN  Outcome: Progressing     Problem: Discharge Planning  Goal: Discharge to home or other facility with appropriate resources  1/13/2023 2258 by Randall Coppola RN  Outcome: Progressing     Problem: Safety - Adult  Goal: Free from fall injury  1/13/2023 2258 by Randall Coppola RN  Outcome: Progressing     Problem: Pain  Goal: Verbalizes/displays adequate comfort level or baseline comfort level  1/13/2023 2258 by Randall Coppola RN  Outcome: Progressing     Problem: Cardiovascular - Adult  Goal: Maintains optimal cardiac output and hemodynamic stability  1/13/2023 2258 by Randall Coppola RN  Outcome: Progressing     Problem: Metabolic/Fluid and Electrolytes - Adult  Goal: Hemodynamic stability and optimal renal function maintained  1/13/2023 2258 by Randall Coppola RN  Outcome: Progressing     Problem: ABCDS Injury Assessment  Goal: Absence of physical injury  1/13/2023 2258 by Randall Coppola RN  Outcome: Progressing

## 2023-01-14 NOTE — DISCHARGE SUMMARY
[address] [place] Electrophysiology  Discharge Summary  Patient: Junaid Alan  Medical Record Number: [id number]  Date of Procedure:  1/14/2023  Operating Electrophysiologist: Maria De Jesus Powell MD  Primary Electrophysiologist: Maria De Jesus Powell MD  Referring Physician: Rowdy Gonsalves MD     Admission Date:1/11/2023      Discharge Date: 1/14/2023    Patient Active Problem List   Diagnosis    Persistent atrial fibrillation ([address]    Type 2 diabetes mellitus with stage 3a chronic kidney disease, with long-term current use of insulin (HCC)    Moderate obesity    LV dysfunction    Nonischemic cardiomyopathy ([address]    Chronic combined systolic and diastolic congestive heart failure ([address]    Obstructive sleep apnea    Mixed hyperlipidemia    Typical atrial flutter (HCC)    Paroxysmal atrial fibrillation ([address]    Tachy-vaughn syndrome ([address]    Sinus node dysfunction ([address]    Status post catheter ablation of atrial fibrillation    Cardiac pacemaker in situ    Actinic keratosis    Benign hypertensive renal disease    Changes in skin texture    Epidermoid cyst of skin    Erectile dysfunction    Essential hypertension    Fatigue    Hyperkalemia    Hypertrophic condition of skin    Inflamed seborrheic keratosis    Lichenification and lichen simplex chronicus    Medical care complication    Pure hypercholesterolemia          Medication List        CHANGE how you take these medications      dofetilide 250 MCG capsule  Commonly known as: TIKOSYN  Take 1 capsule by mouth every 12 hours  What changed:   medication strength  See the new instructions. metoprolol succinate 50 MG extended release tablet  Commonly known as: TOPROL XL  Take 3 tablets by mouth in the morning and at bedtime  What changed: See the new instructions.             CONTINUE taking these medications      atorvastatin 40 MG tablet  Commonly known as: LIPITOR     CPAP Machine Misc     Entresto 49-51 MG per tablet  Generic drug: sacubitril-valsartan  Take 1 tablet by mouth 2 times daily     fenofibric acid 135 MG Cpdr capsule  Commonly known as: TRILIPIX     furosemide 20 MG tablet  Commonly known as: LASIX  Take 1 tablet by mouth daily TAKE     glipiZIDE 10 MG tablet  Commonly known as: GLUCOTROL     Glucosamine 500 MG Caps     Jardiance 25 MG tablet  Generic drug: empagliflozin     Lantus SoloStar 100 UNIT/ML injection pen  Generic drug: insulin glargine     linagliptin 5 MG tablet  Commonly known as: TRADJENTA     magnesium oxide 400 (240 Mg) MG tablet  Commonly known as: MAG-OX  Take 1 tablet by mouth daily     metFORMIN 1000 MG tablet  Commonly known as: GLUCOPHAGE  Take 1 tablet by mouth 2 times daily (with meals)     MULTI VITAMIN DAILY PO     rivaroxaban 20 MG Tabs tablet  Commonly known as: Xarelto  TAKE 1 TABLET DAILY     tadalafil 5 MG tablet  Commonly known as: CIALIS     tamsulosin 0.4 MG capsule  Commonly known as: FLOMAX     vitamin C 100 MG tablet     Vitamin D3 1.25 MG (65116 UT) Tabs               Where to Get Your Medications        These medications were sent to [address] [place] [address] ([facility] ) - [address] [phone] [facility] [phone]  [place] [phone], [address] [facility]      Phone: [phone]   dofetilide 250 MCG capsule  metoprolol succinate 50 MG extended release tablet         Hospital Course: The patient was admitted to the hospital for Tikosyn dose adjustment. Tikosyn was titrated up to 500 mcg twice daily. However he developed prolonged Qtc  on EKG and the dose was eventually decreased down to 250 mcg every 12 hours. He underwent successful DC-cardioversion on 1/13/23 which successfully restored normal sinus rhythm. His Qtc this morning is stable. He is advised to continue using WCD LifeVest and echocardiogram will be repeat in 1 month. If LV EF > 35%, WCD LifeVest can be discontinued.      Procedures Performed: DC-cardioversion and pacemaker interrogation on 1/13/23    Consultants: none    Disposition: The patient was discharged to home in stable condition on the above medications. Clinical follow-up in the office in 1 week for EKG and in 1 month with provider. I have spent a total of 35 minutes with the patient and the family reviewing the above stated recommendations. And a total of >50% of that time involved face-to-face time providing counseling and/or coordination of care with the other providers, reviewing records/tests, counseling/education of the patient, ordering medications/tests/procedures, coordinating care, and documenting clinical information in the EHR.      Pearl Mckeon MD  Cardiac Electrophysiology  [address]  [facility] [place]: [facility]  9:27 AM  1/14/2023

## 2023-01-14 NOTE — PATIENT CARE CONFERENCE
P Quality Flow/Interdisciplinary Rounds Progress Note        Quality Flow Rounds held on January 14, 2023    Disciplines Attending:  Bedside Nurse    Jeremydina Langley was admitted on 1/11/2023 12:42 PM    Anticipated Discharge Date:       Disposition:    Wes Score:  Wes Scale Score: 23    Readmission Risk              Risk of Unplanned Readmission:  14           Discussed patient goal for the day, patient clinical progression, and barriers to discharge.   The following Goal(s) of the Day/Commitment(s) have been identified:   to remain in sinus and be discharged later today      Maryan Zelaya RN  January 14, 2023

## 2023-01-14 NOTE — CARE COORDINATION
Discharge order noted, pt new tikosyn pt. Pt will drive self home. No needs/concerns addressed. Chelsea Cabrera, MSW, LSW

## 2023-01-14 NOTE — PROGRESS NOTES
Pt BS 60. Appeared asymptomatic.4 glucose tablets given. Pt repeat BS 94. This RN did not give lantus. Pt has not received night time dose of lantus the last two nights and has run low in AM. Pt. told me he is to see his endocrinologist Thursday to adjust  lantus dosage.

## 2023-01-16 ENCOUNTER — TELEPHONE (OUTPATIENT)
Dept: NON INVASIVE DIAGNOSTICS | Age: 73
End: 2023-01-16

## 2023-01-16 DIAGNOSIS — I42.8 NONISCHEMIC CARDIOMYOPATHY (HCC): Primary | ICD-10-CM

## 2023-01-16 RX ORDER — FUROSEMIDE 20 MG/1
TABLET ORAL
Qty: 90 TABLET | Refills: 3 | Status: SHIPPED | OUTPATIENT
Start: 2023-01-16

## 2023-01-16 NOTE — TELEPHONE ENCOUNTER
----- Message from Jarrod Davis MD sent at 1/14/2023  9:19 AM EST -----  EKG in 1 week and Follow up in 1 month. He have to leave to [place] 2/10/23 if he an see ANP before that would be great. Please schedule limited echo in 1 month if LV EF > 35% he can discontinue LifeVest. Thanks.

## 2023-01-17 ENCOUNTER — TELEPHONE (OUTPATIENT)
Dept: CARDIOLOGY | Age: 73
End: 2023-01-17

## 2023-01-17 NOTE — TELEPHONE ENCOUNTER
CALLED PATIENT AND LEFT MESSAGE TO SCHEDULE ECHO.     Electronically signed by Yaniv Choudhury on 1/17/2023 at 12:11 PM

## 2023-01-19 ENCOUNTER — NURSE ONLY (OUTPATIENT)
Dept: NON INVASIVE DIAGNOSTICS | Age: 73
End: 2023-01-19
Payer: MEDICARE

## 2023-01-19 DIAGNOSIS — I48.19 PERSISTENT ATRIAL FIBRILLATION (HCC): Primary | ICD-10-CM

## 2023-01-19 PROCEDURE — 93000 ELECTROCARDIOGRAM COMPLETE: CPT | Performed by: INTERNAL MEDICINE

## 2023-01-19 NOTE — PROGRESS NOTES
Patient was seen in the Office today to have EKG per Dr. Dwight Kessler. Pt tolerated EKG.            Pt has no complaints,                 Electronically signed by Alia Bailey MA on 1/19/2023 at 1:44 PM

## 2023-01-20 ENCOUNTER — TELEPHONE (OUTPATIENT)
Dept: NON INVASIVE DIAGNOSTICS | Age: 73
End: 2023-01-20

## 2023-01-20 ENCOUNTER — HOSPITAL ENCOUNTER (OUTPATIENT)
Dept: CARDIOLOGY | Age: 73
Discharge: HOME | End: 2023-01-20
Payer: MEDICARE

## 2023-01-20 DIAGNOSIS — I42.8 NONISCHEMIC CARDIOMYOPATHY (HCC): ICD-10-CM

## 2023-01-20 LAB
LV EF: 43 %
LVEF MODALITY: NORMAL

## 2023-01-20 PROCEDURE — 93308 TTE F-UP OR LMTD: CPT

## 2023-01-20 NOTE — TELEPHONE ENCOUNTER
----- Message from Chadd Matta MD sent at 1/19/2023  2:48 PM EST -----  Thanks.  ----- Message -----  From: Portillo Aguillon RN  Sent: 1/19/2023   2:23 PM EST  To: MD Dr Sigrid Flores I updated [facility] we will get a remote tomorrow.  ----- Message -----  From: Chadd Matta MD  Sent: 1/19/2023   2:04 PM EST  To: Geeta Conklin, RN, Caroline Yin, RN, #    Please check his pacer remotely and see AF burden since DC-CV. Thanks.

## 2023-01-20 NOTE — TELEPHONE ENCOUNTER
Per transmission in [facility] today, presenting EGM is Ap Vs rate 80 bpm. AT/AF burden 01.% since 01/13/2023. Time in AF/AT = 13 minutes.

## 2023-01-23 ENCOUNTER — TELEPHONE (OUTPATIENT)
Dept: NON INVASIVE DIAGNOSTICS | Age: 73
End: 2023-01-23

## 2023-01-23 NOTE — TELEPHONE ENCOUNTER
----- Message from Caio Mancilla MD sent at 1/20/2023  3:14 PM EST -----  Echo showed LV Ef 40-45%.  Can discontinue the LifeVest. Thanks.  ----- Message -----  FromReji Mccormack [facility] Cardiology Results [facility] [place]  Sent: 1/20/2023   2:07 PM EST  To: Caio Mancilla MD

## 2023-02-13 ENCOUNTER — OFFICE VISIT (OUTPATIENT)
Dept: NON INVASIVE DIAGNOSTICS | Age: 73
End: 2023-02-13
Payer: MEDICARE

## 2023-02-13 VITALS
HEART RATE: 85 BPM | BODY MASS INDEX: 35.75 KG/M2 | SYSTOLIC BLOOD PRESSURE: 124 MMHG | WEIGHT: 293.6 LBS | RESPIRATION RATE: 16 BRPM | DIASTOLIC BLOOD PRESSURE: 70 MMHG | HEIGHT: 76 IN

## 2023-02-13 DIAGNOSIS — Z95.0 CARDIAC PACEMAKER IN SITU: ICD-10-CM

## 2023-02-13 DIAGNOSIS — I42.8 NONISCHEMIC CARDIOMYOPATHY (HCC): ICD-10-CM

## 2023-02-13 DIAGNOSIS — Z79.899 VISIT FOR MONITORING TIKOSYN THERAPY: ICD-10-CM

## 2023-02-13 DIAGNOSIS — G47.33 OBSTRUCTIVE SLEEP APNEA: ICD-10-CM

## 2023-02-13 DIAGNOSIS — I49.5 TACHY-BRADY SYNDROME (HCC): ICD-10-CM

## 2023-02-13 DIAGNOSIS — E11.22 TYPE 2 DIABETES MELLITUS WITH STAGE 3A CHRONIC KIDNEY DISEASE, WITH LONG-TERM CURRENT USE OF INSULIN (HCC): ICD-10-CM

## 2023-02-13 DIAGNOSIS — I48.19 PERSISTENT ATRIAL FIBRILLATION (HCC): Primary | ICD-10-CM

## 2023-02-13 DIAGNOSIS — Z51.81 VISIT FOR MONITORING TIKOSYN THERAPY: ICD-10-CM

## 2023-02-13 DIAGNOSIS — Z79.4 TYPE 2 DIABETES MELLITUS WITH STAGE 3A CHRONIC KIDNEY DISEASE, WITH LONG-TERM CURRENT USE OF INSULIN (HCC): ICD-10-CM

## 2023-02-13 DIAGNOSIS — N18.31 TYPE 2 DIABETES MELLITUS WITH STAGE 3A CHRONIC KIDNEY DISEASE, WITH LONG-TERM CURRENT USE OF INSULIN (HCC): ICD-10-CM

## 2023-02-13 PROBLEM — E11.9 DIABETES MELLITUS WITHOUT COMPLICATION (HCC): Status: ACTIVE | Noted: 2019-08-27

## 2023-02-13 PROCEDURE — 3046F HEMOGLOBIN A1C LEVEL >9.0%: CPT | Performed by: NURSE PRACTITIONER

## 2023-02-13 PROCEDURE — G8482 FLU IMMUNIZE ORDER/ADMIN: HCPCS | Performed by: NURSE PRACTITIONER

## 2023-02-13 PROCEDURE — 3074F SYST BP LT 130 MM HG: CPT | Performed by: NURSE PRACTITIONER

## 2023-02-13 PROCEDURE — 3017F COLORECTAL CA SCREEN DOC REV: CPT | Performed by: NURSE PRACTITIONER

## 2023-02-13 PROCEDURE — 1111F DSCHRG MED/CURRENT MED MERGE: CPT | Performed by: NURSE PRACTITIONER

## 2023-02-13 PROCEDURE — 99214 OFFICE O/P EST MOD 30 MIN: CPT | Performed by: NURSE PRACTITIONER

## 2023-02-13 PROCEDURE — G8417 CALC BMI ABV UP PARAM F/U: HCPCS | Performed by: NURSE PRACTITIONER

## 2023-02-13 PROCEDURE — G8427 DOCREV CUR MEDS BY ELIG CLIN: HCPCS | Performed by: NURSE PRACTITIONER

## 2023-02-13 PROCEDURE — 1036F TOBACCO NON-USER: CPT | Performed by: NURSE PRACTITIONER

## 2023-02-13 PROCEDURE — 1123F ACP DISCUSS/DSCN MKR DOCD: CPT | Performed by: NURSE PRACTITIONER

## 2023-02-13 PROCEDURE — 2022F DILAT RTA XM EVC RTNOPTHY: CPT | Performed by: NURSE PRACTITIONER

## 2023-02-13 PROCEDURE — 3078F DIAST BP <80 MM HG: CPT | Performed by: NURSE PRACTITIONER

## 2023-02-13 NOTE — PROGRESS NOTES
[address]  [address] [address] [facility] Electrophysiology  Outpatient Progress Note  Robbin Kehr  1950  Date of Service: 2/13/2023  PCP: Brian Wolfe MD  Cardiologist: Dr. Jay Dumont  Electrophysiologist: Dr. Curt Infante         Subjective: Robbin Kehr is seen for follow-up and management of: Atrial fibrillation    Last seen in the office with Dr. Curt Infante on 1/3/2023, inpatient stay January 2023 for increase in Tikosyn dose. PMH as noted below significant for atrial fibrillation and pacemaker in situ. He underwent cardioversion on 9/2/22 and found to be in 100% AF since 9/12/22 on remote interrogation 12/23/22. The patient was advised Tikosyn dose up titration versus Amiodarone therapy. His echocardiogram on 9/23/22 showed LV EF of 30-35%. His Lisinopril was changed to [place] at the end of September of 2022. Nuclear stress test on 12/2/22 showed no ischemia and LV EF could not be calculated. He was admitted January 2023 and Tikosyn increased to 500 mcg every 12 but he developed prolonged QTc, dose decreased down to 250 mcg every 12 hours. He underwent successful DC-cardioversion on 1/13/23. He was advised to continue using WCD LifeVest.  Limited echo was repeated on 1/20/2023 which showed EF of 40 to 45%. LifeVest was discontinued. He is here for follow-up today. He feels overall good and presents in sinus rhythm. QTc stable.     Patient Active Problem List   Diagnosis    Persistent atrial fibrillation (HCC)    Type 2 diabetes mellitus with stage 3a chronic kidney disease, with long-term current use of insulin (HCC)    Moderate obesity    LV dysfunction    Nonischemic cardiomyopathy (HCC)    Chronic combined systolic and diastolic congestive heart failure (HCC)    Obstructive sleep apnea    Mixed hyperlipidemia    Typical atrial flutter (HCC)    Paroxysmal atrial fibrillation (HCC)    Tachy-vaughn syndrome ([address]    Sinus node dysfunction ([address]    Status post catheter ablation of atrial fibrillation    Cardiac pacemaker in situ    Actinic keratosis    Benign hypertensive renal disease    Changes in skin texture    Epidermoid cyst of skin    Erectile dysfunction    Essential hypertension    Fatigue    Hyperkalemia    Hypertrophic condition of skin    Inflamed seborrheic keratosis    Lichenification and lichen simplex chronicus    Medical care complication    Pure hypercholesterolemia    Diabetes mellitus without complication (HCC)       Current Outpatient Medications   Medication Sig Dispense Refill    furosemide (LASIX) 20 MG tablet TAKE 1 TABLET DAILY 90 tablet 3    dofetilide (TIKOSYN) 250 MCG capsule Take 1 capsule by mouth every 12 hours 180 capsule 1    metoprolol succinate (TOPROL XL) 50 MG extended release tablet Take 3 tablets by mouth in the morning and at bedtime 540 tablet 3    sacubitril-valsartan (ENTRESTO) 49-51 MG per tablet Take 1 tablet by mouth 2 times daily 180 tablet 3    rivaroxaban (XARELTO) 20 MG TABS tablet TAKE 1 TABLET DAILY 90 tablet 3    Ascorbic Acid (VITAMIN C) 100 MG tablet Take 100 mg by mouth in the morning.       tadalafil (CIALIS) 5 MG tablet Take 5 mg by mouth as needed      Cholecalciferol (VITAMIN D3) 1.25 MG (98410 UT) TABS Take 1 capsule by mouth once a week      magnesium oxide (MAG-OX) 400 (240 Mg) MG tablet Take 1 tablet by mouth daily 30 tablet 5    metFORMIN (GLUCOPHAGE) 1000 MG tablet Take 1 tablet by mouth 2 times daily (with meals) 60 tablet 3    linagliptin (TRADJENTA) 5 MG tablet Take 5 mg by mouth daily      Glucosamine 500 MG CAPS Take 2,000 mg by mouth daily       JARDIANCE 25 MG tablet Take 25 mg by mouth daily       glipiZIDE (GLUCOTROL) 10 MG tablet Take 10 mg by mouth daily      LANTUS SOLOSTAR 100 UNIT/ML injection pen Inject 24 Units into the skin nightly      atorvastatin (LIPITOR) 40 MG tablet Take 40 mg by mouth daily       fenofibric acid (FIBRICOR) 135 MG CPDR capsule Take 135 mg by mouth daily       Multiple Vitamin (MULTI VITAMIN DAILY PO) Take by mouth daily      CPAP Machine MISC Inhale into the lungs nightly       tamsulosin (FLOMAX) 0.4 MG capsule Take 0.4 mg by mouth daily. No current facility-administered medications for this visit. No Known Allergies    ROS:   Constitutional: Negative for fever, activity change and appetite change. HENT: Negative for epistaxis. Eyes: Negative for diploplia, blurred vision. Respiratory: Negative for cough, chest tightness, shortness of breath and wheezing. Cardiovascular: pertinent positives in HPI  Gastrointestinal: Negative for abdominal pain and blood in stool. All other review of systems are negative     PHYSICAL EXAM:      Vitals:    02/13/23 0853   BP: 124/70   Site: Left Upper Arm   Position: Sitting   Cuff Size: Medium Adult   Pulse: 85   Resp: 16   Weight: 293 lb 9.6 oz (133.2 kg)   Height: 6' 4\" (1.93 m)     Constitutional: well-developed, no acute distress  Eyes: conjunctivae normal, no xanthelasma   Ears, Nose, Throat: oral mucosa moist, no cyanosis   CV: no JVD. Regular rate and rhythm. Normal S1S2 and no S3. No murmurs, rubs, or gallops. PMI is nondisplaced  Lungs: clear to auscultation bilaterally, normal respiratory effort without used of accessory muscles  Abdomen: soft, non-tender, bowel sounds present, no masses or hepatomegaly   Musculoskeletal: no digital clubbing, no edema   Skin: warm, no rashes     Data:    No results for input(s): WBC, HGB, HCT, PLT in the last 72 hours. No results for input(s): NA, K, CL, CO2, BUN, CREATININE, GLU, CALCIUM in the last 72 hours. Invalid input(s):  MAGNESIUM  Lab Results   Component Value Date/Time    MG 2.2 01/14/2023 04:27 AM     No results for input(s): TSH in the last 72 hours. No results for input(s): INR in the last 72 hours. EKG: Sinus rhythm, rate of 85 bpm QTc 427   please see scan in Cardiology. Echocardiogram: 1/20/2023, limited echo   Summary   Mildly dilated left ventricle.    Mild asymmetric septal hypertrophy. No regional wall motion abnormalities seen with global hypokinesis. Mild-moderately reduced left ventricular systolic dysfunction. The left atrium is severely dilated. Mildly enlarged right atrium size. Signature      ----------------------------------------------------------------   Electronically signed by Selena Olea MD    Device Interrogation:   Underlying rhythm: Sinus rhythm  Mode: DDDR   Battery Voltage/Longevity: 3.0 V, 9.7 years  Pacing: A: 88.3%  RV: Less than 0.1%   P wave: 2.1 mV  Impedance: 494 ohms   Threshold: 0.5 V @0.4 ms  RV R wave: 7.8 mV  Impedance: 703 ohms   Threshold: 0.75 V @0.4 ms  Episodes: Less than 0.1% AF, no AF since cardioversion last episode was on 1/12/2023. Patient was cardioverted on 1/13/2023.  3 episodes being called NSVT last episode on 2/11/2023 lasting 2 to 4 seconds but appears one-to-one and SVT  Reprogramming included: None  Overall device function is normal    All device programmable settings were evaluated per above and in the scanned document, along with iterative adjustments (capture thresholds) to assess and select the most appropriate final programming to provide for consistent delivery of the appropriate therapy and to verify function of the device. Assessment and plan:    1. Persistent atrial fibrillation  - Symptomatic in the past but was unaware that he was in atrial fibrillation today and recently. States he has felt better in the past week. - Diagnosed in 7/2016.  - [id number] of 4 on Xarelto for stroke risk reduction  - KEVIN/DC-CV and Tikosn initiation in 1/2017.   - PVI + LA + RA rotor, SVC isolation, CTI flutter and LA roof linear ablation in 11/2017  - Dofetilide stopping in 6/2018 with recurrent persistent AF in 9/2018. [place] Tikosyn restarted in 12/2018; recurrent AF June 2022 with cardioversion and recurrence 4 days later.   Increase Tikosyn dose (500 mcg with QTc prolongation) increased to 250 mcg twice daily and tolerated well in January 2023.  No AF since prior to cardioversion.  Cardioversion on 1/13/2023.      2. Tachy-vaughn syndrome  - Sinus node dysfunction with sinus arrest >5 seconds and need for Beta-blocker and Dofetilide therapy for rapid AF  - Status post pacemaker insertion on 12/6/18     3. Pacemaker  in situ  - [employer]onic; dual chamber  - Indication: SND  - DOI 12/6/18.  - Normal device function.     4. Chronic systolic CHF/presumed nonischemic  - Recovered LV EF in the past, depressed ejection fraction again with echo on 9/23/2022 showing EF 30-35%.  LifeVest given to patient.>> GDMT and cardioversion back to sinus rhythm January 2023, post 1 month repeat echo showed 40 to 45% ejection fraction.  LifeVest discontinued.  - LV EF 30-35% on TTE 12/19/16.  - LV EF 40-45% on TTE 1/31/17.  - LV EF 40% on KEVIN 11/6/17.  - LV EF 50-55% on TTE 3/22/18.  - NYHA class II, ACC stage C.  - Euvolemic and compensated.     5. CARLO  - Continual daily compliance with CPAP.     6. Obesity  - Recommend weight loss  Body mass index is 35.74 kg/m².     7. Diabetes mellitus  -On multiple oral agents as well as Lantus     8. Hypertension  - Well controlled, stable with systolic blood pressure 130s prior to starting Entresto     9. Hyperlipidemia  - On Lipitor and Fenofibric acid     10. BPH  - On Flomax     11. Vitamin deficiency  - On Vitamins D supplement.     12. Nonsustained ventricular tachycardia  - Noted per previous interrogation.  - Brief  - Asymptomatic.  - On Toprol XL     Recommendations:     1. continue Toprol to 150 mg bid  2.  Continue Tikosyn 250 mcg every 12 hours  3. GDMT continued and managed per cardiology  4.  Continue Xarelto 20 mg daily    Re-education on importance of well controlled HTN (goal BP < 130/80), adequate weight control (goal BMI of < 27), physical activity consisting of moderate cardiopulmonary exercise up to a goal of 250 min/wk, smoking/tobacco abstinence and limited ETOH intake.  I have spent a total of 35 minutes with the patient and the family reviewing the above stated recommendations. And a total of >50% of that time involved face-to-face time providing counseling and or coordination of care with the other providers. Thank you for allowing me to participate in your patient's care. Please call me if there are any questions or concerns.         DEIDRE Mendez - CNP  Cardiac Electrophysiology  [facility]) Physicians  [facility] [place]: [place] [facility]  9:05 AM  2/13/2023

## 2023-03-27 NOTE — ANESTHESIA PRE PROCEDURE
Department of Anesthesiology  Preprocedure Note       Name:  Kasi Varner   Age:  72 y.o.  :  1950                                          MRN:  [id number]         Date:  2023      Surgeon: * Surgery not found *    Procedure:     Vital Signs (Current)   Vitals:    23 0835   BP: 123/75   Pulse: 86   Resp: 16   Temp: 36.4 °C (97.6 °F)   SpO2: 96%     Vital Signs Statistics (for past 48 hrs)     Temp  Av.2 °C (97.2 °F)  Min: 35.9 °C (96.6 °F)   Min taken time: 23 1106  Max: 36.7 °C (98 °F)   Max taken time: 23 034  Pulse  Av.3  Min: 60   Min taken time: 23  Max: 113   Max taken time: 23  Resp  Av.3  Min: 16   Min taken time: 23  Max: 22   Max taken time: 23  BP  Min: 95/68   Min taken time: 23 152  Max: 155/129   Max taken time: 23  MAP (mmHg)  Av.8  Min: 78   Min taken time: 23 152  Max: 136   Max taken time: 23  SpO2  Av.3 %  Min: 92 %   Min taken time: 23  Max: 100 %   Max taken time: 23 034    BP Readings from Last 3 Encounters:   23 123/75   23 126/76   23 114/78     BMI  Body mass index is 35.18 kg/m².  Estimated body mass index is 35.18 kg/m² as calculated from the following:    Height as of this encounter: 6' 4\" (1.93 m).    Weight as of this encounter: 289 lb (131.1 kg).    CBC   Lab Results   Component Value Date/Time    WBC 5.8 2023 01:30 PM    RBC 4.85 2023 01:30 PM    HGB 14.7 2023 01:30 PM    HCT 44.4 2023 01:30 PM    MCV 91.5 2023 01:30 PM    RDW 15.9 2023 01:30 PM     2023 01:30 PM     CMP    Lab Results   Component Value Date/Time     2023 05:46 AM    K 4.4 2023 05:46 AM    K 3.9 2018 05:53 AM     2023 05:46 AM    CO2 23 2023 05:46 AM    BUN 27 2023 05:46 AM    CREATININE 1.2 2023 05:46 AM    GFRAA >60 2022 10:00 AM     Interval History:  Patient without complaints      Objective:     Vital Signs (Most Recent):  Temp: 99.3 °F (37.4 °C) (03/27/23 0534)  Pulse: 83 (03/27/23 0534)  Resp: 16 (03/27/23 0534)  BP: 126/60 (03/27/23 0534)  SpO2: 97 % (03/27/23 0534) Vital Signs (24h Range):  Temp:  [98.4 °F (36.9 °C)-102.6 °F (39.2 °C)] 99.3 °F (37.4 °C)  Pulse:  [] 83  Resp:  [10-17] 16  SpO2:  [81 %-100 %] 97 %  BP: ()/(34-71) 126/60     Weight: 116.8 kg (257 lb 8 oz)  Body mass index is 34.92 kg/m².      Intake/Output Summary (Last 24 hours) at 3/27/2023 0559  Last data filed at 3/27/2023 0540  Gross per 24 hour   Intake 5470.81 ml   Output 1680 ml   Net 3790.81 ml       Physical Exam  Vitals reviewed.   Constitutional:       Appearance: Normal appearance.      Interventions: He is sedated and intubated.   HENT:      Head: Normocephalic and atraumatic.      Nose: Nose normal.      Mouth/Throat:      Mouth: Mucous membranes are dry.      Pharynx: Oropharynx is clear.   Eyes:      Extraocular Movements: Extraocular movements intact.      Conjunctiva/sclera: Conjunctivae normal.      Pupils: Pupils are equal, round, and reactive to light.   Cardiovascular:      Rate and Rhythm: Normal rate.      Heart sounds: Normal heart sounds. No murmur heard.  Pulmonary:      Effort: Pulmonary effort is normal. He is intubated.      Breath sounds: Normal breath sounds.   Abdominal:      General: Abdomen is flat. Bowel sounds are normal.      Palpations: Abdomen is soft.   Musculoskeletal:         General: Normal range of motion.      Cervical back: Normal range of motion and neck supple.      Right lower leg: No edema.      Left lower leg: No edema.   Skin:     General: Skin is warm and dry.      Capillary Refill: Capillary refill takes less than 2 seconds.   Neurological:      General: No focal deficit present.      Mental Status: He is alert and oriented to person, place, and time.   Psychiatric:         Mood and Affect: Mood normal.          Behavior: Behavior normal.     Review of Systems    Vents:  Vent Mode: A/C (03/27/23 0534)  Ventilator Initiated: Yes (03/23/23 0000)  Set Rate: 12 BPM (03/27/23 0534)  Vt Set: 500 mL (03/27/23 0534)  PEEP/CPAP: 5 cmH20 (03/27/23 0534)  Oxygen Concentration (%): 45 (03/27/23 0534)  Peak Airway Pressure: 41 cmH20 (03/27/23 0534)  Total Ve: 8.5 L/m (03/27/23 0534)  F/VT Ratio<105 (RSBI): (!) 18.37 (03/27/23 0534)    Lines/Drains/Airways       Drain  Duration                  NG/OG Tube 03/23/23 0014 [place] sump Right nostril 4 days         Urethral Catheter 03/23/23 1000 Non-latex 16 Fr. 3 days              Airway  Duration                  Airway - Non-Surgical 03/22/23 2358 Endotracheal Tube 4 days              Peripheral Intravenous Line  Duration                  Peripheral IV - Single Lumen 03/23/23 0015 20 G Left;Posterior Forearm 4 days         Peripheral IV - Single Lumen 03/25/23 1929 20 G Anterior;Left;Proximal Forearm 1 day         Peripheral IV - Single Lumen 03/25/23 1935 20 G Anterior;Proximal;Right Forearm 1 day         Peripheral IV - Single Lumen 03/26/23 0422 20 G Anterior;Left Upper Arm 1 day                    Significant Labs:    CBC/Anemia Profile:  Recent Labs   Lab 03/25/23  0754 03/26/23  0445   WBC 8.60 9.12   HGB 8.2* 8.1*   HCT 23.9* 25.5*   * 135*   .2* 112.3*   RDW 15.6* 15.9*        Chemistries:  Recent Labs   Lab 03/26/23  0445      K 3.4*   CL 93*   CO2 42*   BUN 16   CREATININE 1.84*   CALCIUM 7.1*   ALBUMIN 2.6*   PROT 5.8*   BILITOT 0.9   ALKPHOS 62   ALT 55   AST 76*       Recent Lab Results         03/26/23  1059   03/26/23  1056        Influenza B, Molecular   Negative       COVID-19 Ag   Negative       Gram Stain Result Many WBC observed  [P]          Moderate Gram positive bacilli  [P]          Few Gram positive cocci  [P]         Influenza A   Negative                [P] - Preliminary Result               Significant Imaging:  I have reviewed all  LABGLOM >60 01/13/2023 05:46 AM    GLUCOSE 85 01/13/2023 05:46 AM    PROT 7.3 01/11/2023 01:30 PM    CALCIUM 8.8 01/13/2023 05:46 AM    BILITOT 0.5 01/11/2023 01:30 PM    ALKPHOS 62 01/11/2023 01:30 PM    AST 21 01/11/2023 01:30 PM    ALT 15 01/11/2023 01:30 PM     BMP    Lab Results   Component Value Date/Time     01/13/2023 05:46 AM    K 4.4 01/13/2023 05:46 AM    K 3.9 12/07/2018 05:53 AM     01/13/2023 05:46 AM    CO2 23 01/13/2023 05:46 AM    BUN 27 01/13/2023 05:46 AM    CREATININE 1.2 01/13/2023 05:46 AM    CALCIUM 8.8 01/13/2023 05:46 AM    GFRAA >60 09/02/2022 10:00 AM    LABGLOM >60 01/13/2023 05:46 AM    GLUCOSE 85 01/13/2023 05:46 AM     POCGlucose  Recent Labs     01/11/23  1330 01/12/23  0454 01/13/23  0546   GLUCOSE 94 58* 85      Coags    Lab Results   Component Value Date/Time    PROTIME 15.7 12/05/2018 08:06 PM    INR 1.4 12/05/2018 08:06 PM    APTT 29.4 08/21/2016 10:38 AM     HCG (If Applicable) No results found for: PREGTESTUR, PREGSERUM, HCG, HCGQUANT   ABGs No results found for: PHART, PO2ART, DGF2YLF, YNL8WOQ, BEART, X7LKPZNN   Type & Screen (If Applicable)  Lab Results   Component Value Date    ABORH O POS 11/03/2017     Radiology (If Applicable)  Cardiac Testing (If Applicable)   EKG (If Applicable)   Medications prior to admission:   Prior to Admission medications    Medication Sig Start Date End Date Taking?  Authorizing Provider   metoprolol succinate (TOPROL XL) 50 MG extended release tablet TAKE 3 TABLETS (150 MG) IN THE MORNING AND 2 TABLETS (100 MG) IN THE EVENING 11/29/22   DEIDRE Martinez CNP   furosemide (LASIX) 20 MG tablet Take 1 tablet by mouth daily TAKE 11/18/22   Nicole Jacobsen MD   sacubitril-valsartan [facility]) 49-51 MG per tablet Take 1 tablet by mouth 2 times daily 11/11/22   Nicole Jacobsen MD   [facility]) 125 MCG capsule TAKE 1 CAPSULE EVERY 12 HOURS 8/15/22   Otis Ocampo, APRN - CNP   rivaroxaban (XARELTO) 20 MG TABS tablet TAKE 1 TABLET DAILY 7/29/22   Albert Nurse, MD   Ascorbic Acid (VITAMIN C) 100 MG tablet Take 100 mg by mouth in the morning. Historical Provider, MD   tadalafil (CIALIS) 5 MG tablet Take 5 mg by mouth daily    Historical Provider, MD   Cholecalciferol (VITAMIN D3) 1.25 MG (69711 UT) TABS Take 1 capsule by mouth once a week    Historical Provider, MD   magnesium oxide (MAG-OX) 400 (240 Mg) MG tablet Take 1 tablet by mouth daily 12/17/18   Chris Chapa MD   metFORMIN (GLUCOPHAGE) 1000 MG tablet Take 1 tablet by mouth 2 times daily (with meals) 12/8/18   Chris Chapa MD   linagliptin (TRADJENTA) 5 MG tablet Take 5 mg by mouth daily    Historical Provider, MD   Glucosamine 500 MG CAPS Take 2,000 mg by mouth daily     Historical Provider, MD   JARDIANCE 25 MG tablet Take 25 mg by mouth daily  3/5/18   Historical Provider, MD   glipiZIDE (GLUCOTROL) 10 MG tablet Take 10 mg by mouth 2 times daily (before meals)  3/5/18   Historical Provider, MD   LANTUS SOLOSTAR 100 UNIT/ML injection pen Inject 24 Units into the skin nightly 3/5/18   Historical Provider, MD   atorvastatin (LIPITOR) 40 MG tablet Take 40 mg by mouth daily  7/6/17   Historical Provider, MD   fenofibric acid (FIBRICOR) 135 MG CPDR capsule Take 135 mg by mouth daily  4/7/17   Historical Provider, MD   Multiple Vitamin (MULTI VITAMIN DAILY PO) Take by mouth daily    Historical Provider, MD   CPAP Machine MISC Inhale into the lungs nightly     Historical Provider, MD   tamsulosin (FLOMAX) 0.4 MG capsule Take 0.4 mg by mouth daily.     Historical Provider, MD       Current medications:    Current Facility-Administered Medications   Medication Dose Route Frequency Provider Last Rate Last Admin    glucose chewable tablet 16 g  4 tablet Oral PRN Chadd Matta MD        dextrose bolus 10% 125 mL  125 mL IntraVENous PRN Jonathan Soto MD        Or    dextrose bolus 10% 250 mL  250 mL IntraVENous PRN Jonathan Soto MD        glucagon (rDNA) pertinent imaging results/findings within the past 24 hours.   injection 1 mg  1 mg SubCUTAneous PRN Yamini Wahl MD        dextrose 10 % infusion   IntraVENous Continuous PRN Chadd Matta MD        dofetilide (TIKOSYN) capsule 375 mcg  375 mcg Oral 2 times per day Yamini Wahl MD   375 mcg at 01/13/23 0659    tamsulosin (FLOMAX) capsule 0.4 mg  0.4 mg Oral Daily Yamini Wahl MD   0.4 mg at 01/13/23 0754    fenofibrate (TRIGLIDE) tablet 160 mg  160 mg Oral Daily Yamini Wahl MD   160 mg at 01/13/23 0755    atorvastatin (LIPITOR) tablet 40 mg  40 mg Oral Daily Yamini Wahl MD   40 mg at 01/13/23 0754    glipiZIDE (GLUCOTROL) tablet 10 mg  10 mg Oral BID AC Yamini Wahl MD   10 mg at 01/11/23 1752    insulin glargine-yfgn (SEMGLEE-YFGN) injection vial 24 Units  24 Units SubCUTAneous Nightly Chadd Matta MD        alogliptin (NESINA) tablet 25 mg  25 mg Oral Daily Yamini Wahl MD   25 mg at 01/13/23 0755    metFORMIN (GLUCOPHAGE) tablet 1,000 mg  1,000 mg Oral BID WC Yamini Wahl MD   1,000 mg at 01/13/23 0754    magnesium oxide (MAG-OX) tablet 400 mg  400 mg Oral Daily Yamini Wahl MD   400 mg at 01/13/23 0754    tadalafil (CIALIS) tablet 5 mg (Patient Supplied)  5 mg Oral Daily Yamini Wahl MD        [START ON 1/18/2023] vitamin D (ERGOCALCIFEROL) capsule 50,000 Units  50,000 Units Oral Weekly Chadd Matta MD        vitamin C tablet 125 mg  125 mg Oral Daily Yamini Wahl MD   125 mg at 01/13/23 0756    rivaroxaban (XARELTO) tablet 20 mg  20 mg Oral Daily with breakfast Yamini Wahl MD   20 mg at 01/13/23 0755    sacubitril-valsartan (ENTRESTO) 49-51 MG per tablet 1 tablet  1 tablet Oral BID Yamini Wahl MD   1 tablet at 01/13/23 0755    furosemide (LASIX) tablet 20 mg  20 mg Oral Daily Yamini Wahl MD   20 mg at 01/13/23 0754    metoprolol succinate (TOPROL XL) extended release tablet 150 mg  150 mg Oral BID Chadd Matta, MD   150 mg at 01/13/23 0754    sodium chloride flush 0.9 % injection 5-40 mL  5-40 mL IntraVENous 2 times per day Maria De Jesus Powell MD   10 mL at 01/13/23 0756    sodium chloride flush 0.9 % injection 5-40 mL  5-40 mL IntraVENous PRN Chadd Matta MD        0.9 % sodium chloride infusion   IntraVENous PRN Maria De Jesus Powell MD        acetaminophen (TYLENOL) tablet 650 mg  650 mg Oral Q6H PRN Maria De Jesus Powell MD        Or    acetaminophen (TYLENOL) suppository 650 mg  650 mg Rectal Q6H PRN Maria De Jesus Powell MD        senna (SENOKOT) tablet 8.6 mg  1 tablet Oral Daily PRN Maria De Jesus Powell MD        empagliflozin (JARDIANCE) tablet 25 mg  25 mg Oral Daily Maria De Jesus Powell MD   25 mg at 01/13/23 0756       Allergies:  No Known Allergies    Problem List:    Patient Active Problem List   Diagnosis Code    Persistent atrial fibrillation (HCC) I48.19    Type 2 diabetes mellitus with stage 3a chronic kidney disease, with long-term current use of insulin (HCC) E11.22, N18.31, Z79.4    Moderate obesity E66.8    LV dysfunction I51.9    Nonischemic cardiomyopathy ([address] I42.8    Chronic combined systolic and diastolic congestive heart failure (HCC) I50.42    Obstructive sleep apnea G47.33    Mixed hyperlipidemia E78.2    Typical atrial flutter (HCC) I48.3    Paroxysmal atrial fibrillation (HCC) I48.0    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 9XXA  Pure hypercholesterolemia E78.00       Past Medical History:        Diagnosis Date    Arthritis     Asbestos exposure     Atrial fibrillation ([facility] 2016    w/RVR    BPH (benign prostatic hyperplasia)     Cardiomyopathy ([facility]     CHF (congestive heart failure) (HCC)     CKD (chronic kidney disease)     Diabetes mellitus ([facility]     History of cardioversion 2018    admitted for Tykosyn therapy    Hyperlipidemia     Hypertension     Obesity     Sleep apnea     CPAP       Past Surgical History:        Procedure Laterality Date    ATRIAL ABLATION SURGERY  2017    atrial fib    CARDIOVERSION  2016    CARDIOVERSION  11/15/2016    DCCV    CARDIOVERSION  2017    Dr. Corrina Ramirez  2017    CARDIOVERSION  2022    Successful (Dr. Akers)   [address]    PACEMAKER INSERTION  2018    D-PPM  ([employer])  [place]    ROTATOR CUFF REPAIR Right 2015    TRANSESOPHAGEAL ECHOCARDIOGRAM  2017       Social History:    Social History     Tobacco Use    Smoking status: Former     Packs/day: 1.00     Years: 5.00     Pack years: 5.00     Types: Cigarettes     Quit date: 1972     Years since quittin.0    Smokeless tobacco: Never   Substance Use Topics    Alcohol use: Not Currently                                Counseling given: Not Answered      Vital Signs (Current):   Vitals:    23 2317 23 0311 23 0731 23 0835   BP: 97/71 117/77 115/80 123/75   Pulse: (!) 106 83 70 86   Resp: 17 16 16 16   Temp: 36.3 °C (97.4 °F) 36.4 °C (97.5 °F) 36.2 °C (97.1 °F) 36.4 °C (97.6 °F)   TempSrc: Temporal Temporal Temporal Temporal   SpO2: 98% 97% 96% 96%   Weight:    289 lb (131.1 kg)   Height:    6' 4\" (1.93 m)                                              BP Readings from Last 3 Encounters:   23 123/75   23 126/76   23 114/78       NPO Status: BMI:   Wt Readings from Last 3 Encounters:   01/13/23 289 lb (131.1 kg)   01/04/23 291 lb 3.2 oz (132.1 kg)   01/03/23 294 lb (133.4 kg)     Body mass index is 35.18 kg/m². CBC:   Lab Results   Component Value Date/Time    WBC 5.8 01/11/2023 01:30 PM    RBC 4.85 01/11/2023 01:30 PM    HGB 14.7 01/11/2023 01:30 PM    HCT 44.4 01/11/2023 01:30 PM    MCV 91.5 01/11/2023 01:30 PM    RDW 15.9 01/11/2023 01:30 PM     01/11/2023 01:30 PM       CMP:   Lab Results   Component Value Date/Time     01/13/2023 05:46 AM    K 4.4 01/13/2023 05:46 AM    K 3.9 12/07/2018 05:53 AM     01/13/2023 05:46 AM    CO2 23 01/13/2023 05:46 AM    BUN 27 01/13/2023 05:46 AM    CREATININE 1.2 01/13/2023 05:46 AM    GFRAA >60 09/02/2022 10:00 AM    LABGLOM >60 01/13/2023 05:46 AM    GLUCOSE 85 01/13/2023 05:46 AM    PROT 7.3 01/11/2023 01:30 PM    CALCIUM 8.8 01/13/2023 05:46 AM    BILITOT 0.5 01/11/2023 01:30 PM    ALKPHOS 62 01/11/2023 01:30 PM    AST 21 01/11/2023 01:30 PM    ALT 15 01/11/2023 01:30 PM       POC Tests: No results for input(s): POCGLU, POCNA, POCK, POCCL, POCBUN, POCHEMO, POCHCT in the last 72 hours.     Coags:   Lab Results   Component Value Date/Time    PROTIME 15.7 12/05/2018 08:06 PM    INR 1.4 12/05/2018 08:06 PM    APTT 29.4 08/21/2016 10:38 AM       HCG (If Applicable): No results found for: PREGTESTUR, PREGSERUM, HCG, HCGQUANT     ABGs: No results found for: PHART, PO2ART, MBW2MXN, OKA6HGV, BEART, N5DXMTDA     Type & Screen (If Applicable):  No results found for: LABABO, LABRH    Drug/Infectious Status (If Applicable):  No results found for: HIV, HEPCAB    COVID-19 Screening (If Applicable): No results found for: COVID19        Anesthesia Evaluation  Patient summary reviewed and Nursing notes reviewed no history of anesthetic complications:   Airway: Mallampati: III  TM distance: <3 FB   Neck ROM: full  Mouth opening: < 3 FB   Dental: normal exam         Pulmonary: breath sounds clear to auscultation  (+) sleep apnea: on CPAP,                            ROS comment: Former smoker   Cardiovascular:    (+) hypertension:, pacemaker:, dysrhythmias: atrial fibrillation, CHF:,                   Neuro/Psych:               GI/Hepatic/Renal:   (+) renal disease: CRI, morbid obesity          Endo/Other:    (+) DiabetesType II DM, , .                 Abdominal:             Vascular: Other Findings:           Anesthesia Plan      MAC     ASA 3       Induction: intravenous. Anesthetic plan and risks discussed with patient. Use of blood products discussed with patient whom. Plan discussed with attending.                     DEIDRE Melissa - CRNA   1/13/2023

## 2023-03-29 ENCOUNTER — TELEPHONE (OUTPATIENT)
Dept: ADMINISTRATIVE | Age: 73
End: 2023-03-29

## 2023-03-29 ENCOUNTER — TELEPHONE (OUTPATIENT)
Dept: NON INVASIVE DIAGNOSTICS | Age: 73
End: 2023-03-29

## 2023-03-29 DIAGNOSIS — I48.19 PERSISTENT ATRIAL FIBRILLATION (HCC): Primary | ICD-10-CM

## 2023-03-29 RX ORDER — DOFETILIDE 0.25 MG/1
250 CAPSULE ORAL EVERY 12 HOURS SCHEDULED
Qty: 180 CAPSULE | Refills: 2 | Status: SHIPPED | OUTPATIENT
Start: 2023-03-29

## 2023-03-29 NOTE — TELEPHONE ENCOUNTER
Patient had cardio version in Jan, wants to know when Dr Patricia Chance wants to see him for follow up. Please call him and advise.

## 2023-03-29 NOTE — TELEPHONE ENCOUNTER
Patient called and said he rec'd text from [address] [place] [address] that they closed his script out and they do not have any in stock. I called them and they said it is a  problem. So I called [facility] in [facility] and they do have in stock and have not heard of this issue.

## 2023-05-04 ENCOUNTER — OFFICE VISIT (OUTPATIENT)
Dept: CARDIOLOGY CLINIC | Age: 73
End: 2023-05-04
Payer: MEDICARE

## 2023-05-04 VITALS
BODY MASS INDEX: 34.76 KG/M2 | DIASTOLIC BLOOD PRESSURE: 70 MMHG | HEART RATE: 75 BPM | OXYGEN SATURATION: 99 % | WEIGHT: 285.4 LBS | HEIGHT: 76 IN | SYSTOLIC BLOOD PRESSURE: 132 MMHG | RESPIRATION RATE: 16 BRPM

## 2023-05-04 DIAGNOSIS — I50.42 CHRONIC COMBINED SYSTOLIC AND DIASTOLIC CONGESTIVE HEART FAILURE (HCC): ICD-10-CM

## 2023-05-04 DIAGNOSIS — E78.00 PURE HYPERCHOLESTEROLEMIA: ICD-10-CM

## 2023-05-04 DIAGNOSIS — E66.8 MODERATE OBESITY: ICD-10-CM

## 2023-05-04 DIAGNOSIS — G47.33 OBSTRUCTIVE SLEEP APNEA: ICD-10-CM

## 2023-05-04 DIAGNOSIS — I10 ESSENTIAL HYPERTENSION: ICD-10-CM

## 2023-05-04 DIAGNOSIS — I49.5 SINUS NODE DYSFUNCTION (HCC): ICD-10-CM

## 2023-05-04 DIAGNOSIS — I42.8 NONISCHEMIC CARDIOMYOPATHY (HCC): Primary | ICD-10-CM

## 2023-05-04 DIAGNOSIS — Z79.4 TYPE 2 DIABETES MELLITUS WITH STAGE 3A CHRONIC KIDNEY DISEASE, WITH LONG-TERM CURRENT USE OF INSULIN (HCC): ICD-10-CM

## 2023-05-04 DIAGNOSIS — I48.0 PAROXYSMAL ATRIAL FIBRILLATION (HCC): ICD-10-CM

## 2023-05-04 DIAGNOSIS — E11.22 TYPE 2 DIABETES MELLITUS WITH STAGE 3A CHRONIC KIDNEY DISEASE, WITH LONG-TERM CURRENT USE OF INSULIN (HCC): ICD-10-CM

## 2023-05-04 DIAGNOSIS — N18.31 TYPE 2 DIABETES MELLITUS WITH STAGE 3A CHRONIC KIDNEY DISEASE, WITH LONG-TERM CURRENT USE OF INSULIN (HCC): ICD-10-CM

## 2023-05-04 PROCEDURE — 3075F SYST BP GE 130 - 139MM HG: CPT | Performed by: INTERNAL MEDICINE

## 2023-05-04 PROCEDURE — 3017F COLORECTAL CA SCREEN DOC REV: CPT | Performed by: INTERNAL MEDICINE

## 2023-05-04 PROCEDURE — 1123F ACP DISCUSS/DSCN MKR DOCD: CPT | Performed by: INTERNAL MEDICINE

## 2023-05-04 PROCEDURE — 1036F TOBACCO NON-USER: CPT | Performed by: INTERNAL MEDICINE

## 2023-05-04 PROCEDURE — 93000 ELECTROCARDIOGRAM COMPLETE: CPT | Performed by: INTERNAL MEDICINE

## 2023-05-04 PROCEDURE — G8417 CALC BMI ABV UP PARAM F/U: HCPCS | Performed by: INTERNAL MEDICINE

## 2023-05-04 PROCEDURE — 99215 OFFICE O/P EST HI 40 MIN: CPT | Performed by: INTERNAL MEDICINE

## 2023-05-04 PROCEDURE — 3078F DIAST BP <80 MM HG: CPT | Performed by: INTERNAL MEDICINE

## 2023-05-04 PROCEDURE — G8427 DOCREV CUR MEDS BY ELIG CLIN: HCPCS | Performed by: INTERNAL MEDICINE

## 2023-05-04 PROCEDURE — 3046F HEMOGLOBIN A1C LEVEL >9.0%: CPT | Performed by: INTERNAL MEDICINE

## 2023-05-04 PROCEDURE — 2022F DILAT RTA XM EVC RTNOPTHY: CPT | Performed by: INTERNAL MEDICINE

## 2023-05-04 RX ORDER — TADALAFIL 20 MG/1
20 TABLET ORAL PRN
COMMUNITY

## 2023-05-04 RX ORDER — ASCORBIC ACID 500 MG
1000 TABLET ORAL DAILY
COMMUNITY

## 2023-05-04 RX ORDER — SEMAGLUTIDE 1.34 MG/ML
0.25 INJECTION, SOLUTION SUBCUTANEOUS
COMMUNITY

## 2023-05-04 NOTE — PROGRESS NOTES
obstructive sleep apnea beyond that of ongoing compliance with use of nocturnal CPAP. Ongoing aggressive risk factor modification blood pressure, diabetes and serum with goals of maintaining LDL cholesterol is below 70 mg/dL will remain essential to reducing risk of future atherosclerotic development. I presently plan his clinical reevaluation approxi-6 months and would happily reevaluate him in the interim should additional cardiovascular records or concerns arise. The patient's current medication list, allergies, problem list and results of all previously ordered testing were reviewed at today's visit. Follow-up office visit in 6 months      Note: This report was completed using computerized voice recognition software. Every effort has been made to ensure accuracy, however; inadvertent computerized transcription errors may be present. Jose Terrazas.  Carlos Lew, [facility]    An electronic copy of this follow-up progress note was forwarded to Dr. Ran Pratt and [facility]

## 2023-06-21 ENCOUNTER — HOSPITAL ENCOUNTER (OUTPATIENT)
Age: 73
Discharge: HOME | End: 2023-06-21
Payer: MEDICARE

## 2023-06-21 DIAGNOSIS — I50.42 CHRONIC COMBINED SYSTOLIC AND DIASTOLIC CONGESTIVE HEART FAILURE (HCC): ICD-10-CM

## 2023-06-21 DIAGNOSIS — I42.8 NONISCHEMIC CARDIOMYOPATHY (HCC): ICD-10-CM

## 2023-06-21 LAB
ALBUMIN SERPL-MCNC: 4.2 G/DL (ref 3.5–5.2)
ALP SERPL-CCNC: 57 U/L (ref 40–129)
ALT SERPL-CCNC: 14 U/L (ref 0–40)
ANION GAP SERPL CALCULATED.3IONS-SCNC: 10 MMOL/L (ref 7–16)
ANION GAP SERPL CALCULATED.3IONS-SCNC: 12 MMOL/L (ref 7–16)
AST SERPL-CCNC: 18 U/L (ref 0–39)
BASOPHILS # BLD: 0.04 E9/L (ref 0–0.2)
BASOPHILS NFR BLD: 0.7 % (ref 0–2)
BILIRUB SERPL-MCNC: 0.4 MG/DL (ref 0–1.2)
BNP BLD-MCNC: 199 PG/ML (ref 0–450)
BUN SERPL-MCNC: 19 MG/DL (ref 6–23)
BUN SERPL-MCNC: 19 MG/DL (ref 6–23)
CALCIUM SERPL-MCNC: 9.2 MG/DL (ref 8.6–10.2)
CALCIUM SERPL-MCNC: 9.2 MG/DL (ref 8.6–10.2)
CHLORIDE SERPL-SCNC: 105 MMOL/L (ref 98–107)
CHLORIDE SERPL-SCNC: 106 MMOL/L (ref 98–107)
CO2 SERPL-SCNC: 24 MMOL/L (ref 22–29)
CO2 SERPL-SCNC: 26 MMOL/L (ref 22–29)
CREAT SERPL-MCNC: 1.2 MG/DL (ref 0.7–1.2)
CREAT SERPL-MCNC: 1.2 MG/DL (ref 0.7–1.2)
CREAT UR-MCNC: 108 MG/DL (ref 40–278)
EOSINOPHIL # BLD: 0.24 E9/L (ref 0.05–0.5)
EOSINOPHIL NFR BLD: 4.2 % (ref 0–6)
ERYTHROCYTE [DISTWIDTH] IN BLOOD BY AUTOMATED COUNT: 13.1 FL (ref 11.5–15)
GLUCOSE SERPL-MCNC: 130 MG/DL (ref 74–99)
GLUCOSE SERPL-MCNC: 131 MG/DL (ref 74–99)
HBA1C MFR BLD: 7.4 % (ref 4–5.6)
HCT VFR BLD AUTO: 45.3 % (ref 37–54)
HGB BLD-MCNC: 14.8 G/DL (ref 12.5–16.5)
IMM GRANULOCYTES # BLD: 0.02 E9/L
IMM GRANULOCYTES NFR BLD: 0.4 % (ref 0–5)
LYMPHOCYTES # BLD: 1.18 E9/L (ref 1.5–4)
LYMPHOCYTES NFR BLD: 20.8 % (ref 20–42)
MAGNESIUM SERPL-MCNC: 2.1 MG/DL (ref 1.6–2.6)
MCH RBC QN AUTO: 31.6 PG (ref 26–35)
MCHC RBC AUTO-ENTMCNC: 32.7 % (ref 32–34.5)
MCV RBC AUTO: 96.8 FL (ref 80–99.9)
MONOCYTES # BLD: 0.4 E9/L (ref 0.1–0.95)
MONOCYTES NFR BLD: 7.1 % (ref 2–12)
NEUTROPHILS # BLD: 3.79 E9/L (ref 1.8–7.3)
NEUTS SEG NFR BLD: 66.8 % (ref 43–80)
PHOSPHATE SERPL-MCNC: 3.2 MG/DL (ref 2.5–4.5)
PLATELET # BLD AUTO: 224 E9/L (ref 130–450)
PMV BLD AUTO: 10.3 FL (ref 7–12)
POTASSIUM SERPL-SCNC: 4.9 MMOL/L (ref 3.5–5)
POTASSIUM SERPL-SCNC: 4.9 MMOL/L (ref 3.5–5)
PROT SERPL-MCNC: 7 G/DL (ref 6.4–8.3)
PROT UR-MCNC: 8 MG/DL (ref 0–12)
PROTEIN/CREAT RATIO: 0.1
PROTEIN/CREAT RATIO: 0.1 (ref 0–0.2)
RBC # BLD AUTO: 4.68 E12/L (ref 3.8–5.8)
SODIUM SERPL-SCNC: 141 MMOL/L (ref 132–146)
SODIUM SERPL-SCNC: 142 MMOL/L (ref 132–146)
URATE SERPL-MCNC: 5.2 MG/DL (ref 3.4–7)
WBC # BLD: 5.7 E9/L (ref 4.5–11.5)

## 2023-06-21 PROCEDURE — 84156 ASSAY OF PROTEIN URINE: CPT

## 2023-06-21 PROCEDURE — 85025 COMPLETE CBC W/AUTO DIFF WBC: CPT

## 2023-06-21 PROCEDURE — 84100 ASSAY OF PHOSPHORUS: CPT

## 2023-06-21 PROCEDURE — 84550 ASSAY OF BLOOD/URIC ACID: CPT

## 2023-06-21 PROCEDURE — 83036 HEMOGLOBIN GLYCOSYLATED A1C: CPT

## 2023-06-21 PROCEDURE — 83880 ASSAY OF NATRIURETIC PEPTIDE: CPT

## 2023-06-21 PROCEDURE — 83735 ASSAY OF MAGNESIUM: CPT

## 2023-06-21 PROCEDURE — 82570 ASSAY OF URINE CREATININE: CPT

## 2023-06-21 PROCEDURE — 80048 BASIC METABOLIC PNL TOTAL CA: CPT

## 2023-06-21 PROCEDURE — 36415 COLL VENOUS BLD VENIPUNCTURE: CPT

## 2023-06-21 PROCEDURE — 80053 COMPREHEN METABOLIC PANEL: CPT

## 2023-08-09 ENCOUNTER — TELEPHONE (OUTPATIENT)
Dept: NON INVASIVE DIAGNOSTICS | Age: 73
End: 2023-08-09

## 2023-08-09 NOTE — TELEPHONE ENCOUNTER
Pt called stated he is out of town and has no refill of Tikosyn, stated he spoke with Merle Ramires in [place] and they will put a hold on it for him to  where he is at if we call in. A 90 day refill was called in on 3/29/23 with 2 refills. I called the pharmacy they stated he does have refills and will transfer when he requests them.      Called pt LMM

## 2023-08-11 DIAGNOSIS — I48.19 PERSISTENT ATRIAL FIBRILLATION (HCC): ICD-10-CM

## 2023-08-11 RX ORDER — SACUBITRIL AND VALSARTAN 49; 51 MG/1; MG/1
TABLET, FILM COATED ORAL
Qty: 180 TABLET | Refills: 3 | Status: SHIPPED | OUTPATIENT
Start: 2023-08-11

## 2023-08-14 NOTE — TELEPHONE ENCOUNTER
Request: From [facility]    Last visit: 2/13/23 with Julissa Ast APRN-CNP  Next visit: No Scheduled visit, on recall list    Provider: Dr Leandro Page: 6/21/23     Pharm[facility]

## 2023-08-16 RX ORDER — DOFETILIDE 0.25 MG/1
250 CAPSULE ORAL EVERY 12 HOURS SCHEDULED
Qty: 180 CAPSULE | Refills: 1 | Status: SHIPPED | OUTPATIENT
Start: 2023-08-16

## 2023-08-16 NOTE — TELEPHONE ENCOUNTER
I saw the patient at the kind request of Sagar Padilla MD , regarding:    Chief Complaint   Patient presents with   • Consultation     Psoriasis; Joint Pain - Referring: Sagar Padilla MD       HISTORY OF PRESENT ILLNESS:  This is a pleasant 50 year old White  male who presents today for psoriasis that he has had for approximately 4-5 years. Today, it is minimally on his hands and bilateral elbows. He does note his paternal grandmother had psoriasis. In his early 40s, he started to experience joint pain in his hips, shoulders, knees, and back. The pain was worse in the morning and would decrease throughout the day. The pain was getting progressively worse, he would take naproxen at night. He started Otezla for his psoriasis and it has been beneficial for his joint pain and erectile dysfunction as well. He states he takes naproxen approximately once a month since starting Otezla. He denies any nail or eye problems.     Of note, he has had surgery for Dupuytren's contracture of right ring finger and left middle finger in the past without lasting benefit.     He does  not have joint pain today. When he does have joint pain, it predominantly affects his right knee.  The pain has been minimal since the starting Otezla. The pain is alleviated by medication including Otezla and naproxen.  He does  experience morning stiffness. It lasts 1-2 hours.  He has difficulty with touching feet while seated, going to sleep, staying asleep due to pain, sexual relationship, morning stiffness.  He uses no assistive device. He does  experience fatigue. Denies fevers, chills or night sweats. His weight has been increasing, 45 pounds.     Other complaints include generalized weakness, high blood pressure.    Recent laboratory tests were reviewed with the patient, creatinine was 0.62 mg/dL with estimated GFR of >90 mL/min/1.73m2. HbA1c was 7.6%, albumin was 3.3 g/dL, globulin was 4.2 g/dL, cholesterol was unremarkable.      Recent  Please ensure he has follow up with EP, thanks. images of XR ribs and chest were reviewed with the patient, showing degenerative changes, no syndesmophyte formation.  Results for orders placed in visit on 08/23/19   XR RIBS LEFT W PA CHEST    Narrative EXAM: XR RIBS LEFT W PA CHEST    DATE OF EXAM: 8/23/2019 7:59 AM.    COMPARISON: None.     CLINICAL INFORMATION: left lower anterior/lateral rib pain after cough,  room 9.    FINDINGS: The heart is normal in size as is the pulmonary vasculature.  There is no evidence of pneumothorax or pleural effusion.    Images of the left ribs demonstrate no distinct fracture or bony  abnormality.      Impression IMPRESSION: Negative.       From a rheumatologic standpoint, the patient has had no photosensitivity, malar rash, eye pain or redness, sicca syndrome, recurrent oral ulcers, dysphagia, pleuritic chest pain, dyspnea on exertion, chronic cough, constipation, diarrhea, skin thickening, alopecia, or Raynaud's phenomena. No history of inflammatory bowel disease.    PAST MEDICAL HISTORY:  Past Medical History:   Diagnosis Date   • HTN (hypertension)    • Hyperlipidemia    • Onychomycosis     of the toes   • Rectal fissure    • Sinusitis, chronic    • Vestibular disorder 01/2009    post-traumatic vestibular syndrome following a fall       PAST SURGICAL HISTORY:  Past Surgical History:   Procedure Laterality Date   • Colonoscopy  01/27/2020    repeat in 5 years    • Dupuytren contracture release      2nd digit, right hand   • Excision of lingual tonsil     • Hernia repair     • Lasik surgery         ALLERGIES:  Allergies as of 05/05/2021 - Reviewed 05/05/2021   Allergen Reaction Noted   • Cephalosporins     • Penicillins     • Sulfa antibiotics         MEDICATIONS:  Current Outpatient Medications   Medication   • semaglutide,0.25 or 0.5 mg/DOSE, (Ozempic, 0.25 or 0.5 MG/DOSE,) (1.34 mg/ml) 0.25 or 0.5 MG/DOSE injection   • atenolol (TENORMIN) 50 MG tablet   • losartan (COZAAR) 100 MG tablet   • pravastatin (PRAVACHOL) 20 MG  tablet   • Apremilast (Otezla) 10 & 20 & 30 MG Tablet Therapy Pack   • fluocinonide (LIDEX) 0.05 % ointment   • ketoconazole (NIZORAL) 2 % cream   • repaglinide (PRANDIN) 0.5 MG tablet   • metFORMIN (GLUCOPHAGE-XR) 500 MG 24 hr tablet   • fenofibrate 160 MG tablet   • mometasone (NASONEX) 50 MCG/ACT nasal spray   • Blood Glucose Monitoring Suppl w/Device Kit   • Lancets Misc. Kit   • blood glucose test strip   • mometasone (ELOCON) 0.1 % cream   • Apremilast (Otezla) 30 MG Tab   • tadalafil (Cialis) 20 MG tablet   • sildenafil (VIAGRA) 100 MG tablet     No current facility-administered medications for this visit.       FAMILY HISTORY:  No family history on file.    SOCIAL HISTORY:  Social History     Tobacco Use   • Smoking status: Never Smoker   • Smokeless tobacco: Never Used   Substance Use Topics   • Alcohol use: Yes     Alcohol/week: 1.0 standard drinks     Types: 1 Cans of beer per week   • Drug use: Never       REVIEW OF SYSTEMS:  See history of present illness; otherwise all negative. I reviewed the form filled out by the patient.     See nursing note attached for Patient History/Systems Review form.    PHYSICAL EXAMINATION:  General: Alert, awake, oriented times 3, not in acute distress. Pleasant and appears stated age.  Vital Signs: Blood pressure 132/80, pulse 88, resp. rate 16, height 5' 11\" (1.803 m), weight 128.5 kg.  Skin:   Psoriasis over metacarpophalangeal joints and bilateral elbows, 1%.     No palpable purpura or livedo on the upper and lower extremities.    HEENT:  Pink palpebral conjunctivae, anicteric sclerae.    No malar rash, oronasal ulcerations or parotid gland enlargement with moist buccal mucosa.   No heliotrope rash.   No scalp tenderness.   Neck: Supple, no carotid bruit, thyromegaly or lymphadenopathy with no palpable mass.   Lungs: No rales or wheezing.    Heart: No gallop, significant murmur or friction rub.    Abdomen: Soft, positive bowel sounds, no hepatosplenomegaly or  tenderness.    Extremities: No bipedal edema, cyanosis or clubbing with palpable pedal pulses.   No sclerodactyly or periungual erythema.  Warm and well perfused.  Neurological:  Did not show proximal muscle weakness.  Knee reflex 2+ bilaterally.  Musculoskeletal:  All distal interphalangeal joints, proximal interphalangeal joints and metacarpal phalangeal joints with good range of motion without synovial thickening.    Dupuytren's contracture of right ring finger and left middle finger.    Wrists and elbows with good range of motion without swelling.    Shoulders with good range of motion without pain on motion.    Hips with good range of motion without pain on motion.    Left knee is cool without effusion.   Right knee with increased warmth with small effusion.    Both knees with normal range of motion. Crepitus on motion.    Ankles are nontender without swelling. Normal range of motion.    No tenderness along bilateral achilles tendon.    Metatarsal squeeze test negative bilaterally.    Neck with good range of motion.    Lumbar spine with limited forward flexion 90 degrees.    No tenderness along bilateral sacroiliac joints.      LABS:  As mentioned in HPI      ASSESSMENT:  1. Psoriatic arthritis.   Affecting large joints, prolonged morning stiffness, joint pain worse upon wakening. Good response to nonsteroidal anti-inflammatory drugs (NSAIDs) and OTEZLA.   Right knee with small effusion.      2. Psoriasis.    Severe in the past, mild since initiating Otezla.     3. Dupuytren's contracture of right ring finger and left middle finger, grade 2.      PLAN:  1. Education materials on psoriatic arthritis were given. The management was discussed.      2. Continue Otezla.      3. Obtain Xray of right knee, 3 views today.    If the xray shows effusion, our office will schedule the patient for a diagnostic aspiration.      All his questions were addressed.  I will see the patient back in 6 months for follow-up.  The  patient was advised to call back should any questions or concerns arise in the interim.     As always, Sagar Padilla MD , thank you for allowing me to participate in the care of this patient.     On 05/05/21, I, Eve SHEEHAN, scribed the services personally performed Mj Velez MD    The documentation recorded by the scribe accurately and completely reflects the service(s) I personally performed and the decisions made by me.

## 2023-09-05 ENCOUNTER — TELEPHONE (OUTPATIENT)
Dept: NON INVASIVE DIAGNOSTICS | Age: 73
End: 2023-09-05

## 2023-09-05 NOTE — TELEPHONE ENCOUNTER
Contacted [facility] per patient request to inform the last shipment was sent to [place] [facility], pharmacy will look into the issue and resubmit.        Electronically signed by Alba Alanis MA on 9/5/2023 at 11:17 AM

## 2023-09-14 ENCOUNTER — TELEPHONE (OUTPATIENT)
Dept: ORTHOPEDIC SURGERY | Age: 73
End: 2023-09-14

## 2023-09-15 ENCOUNTER — HOSPITAL ENCOUNTER (OUTPATIENT)
Age: 73
Discharge: HOME | End: 2023-09-15
Payer: MEDICARE

## 2023-09-15 LAB
ALBUMIN SERPL-MCNC: 4.6 G/DL (ref 3.5–5.2)
ALP SERPL-CCNC: 59 U/L (ref 40–129)
ALT SERPL-CCNC: 12 U/L (ref 0–40)
ANION GAP SERPL CALCULATED.3IONS-SCNC: 9 MMOL/L (ref 7–16)
AST SERPL-CCNC: 15 U/L (ref 0–39)
BASOPHILS # BLD: 0.04 K/UL (ref 0–0.2)
BASOPHILS NFR BLD: 1 % (ref 0–2)
BILIRUB SERPL-MCNC: 0.2 MG/DL (ref 0–1.2)
BUN SERPL-MCNC: 21 MG/DL (ref 6–23)
CALCIUM SERPL-MCNC: 9.6 MG/DL (ref 8.6–10.2)
CHLORIDE SERPL-SCNC: 105 MMOL/L (ref 98–107)
CHOLEST SERPL-MCNC: 113 MG/DL
CO2 SERPL-SCNC: 28 MMOL/L (ref 22–29)
CREAT SERPL-MCNC: 1.2 MG/DL (ref 0.7–1.2)
CREAT UR-MCNC: 97.7 MG/DL (ref 40–278)
EOSINOPHIL # BLD: 0.27 K/UL (ref 0.05–0.5)
EOSINOPHILS RELATIVE PERCENT: 4 % (ref 0–6)
ERYTHROCYTE [DISTWIDTH] IN BLOOD BY AUTOMATED COUNT: 13.6 % (ref 11.5–15)
GFR SERPL CREATININE-BSD FRML MDRD: >60 ML/MIN/1.73M2
GLUCOSE SERPL-MCNC: 150 MG/DL (ref 74–99)
HBA1C MFR BLD: 6.8 % (ref 4–5.6)
HCT VFR BLD AUTO: 46.4 % (ref 37–54)
HDLC SERPL-MCNC: 44 MG/DL
HGB BLD-MCNC: 15.1 G/DL (ref 12.5–16.5)
IMM GRANULOCYTES # BLD AUTO: 0.03 K/UL (ref 0–0.58)
IMM GRANULOCYTES NFR BLD: 1 % (ref 0–5)
LDLC SERPL CALC-MCNC: 52 MG/DL
LYMPHOCYTES NFR BLD: 1.15 K/UL (ref 1.5–4)
LYMPHOCYTES RELATIVE PERCENT: 18 % (ref 20–42)
MCH RBC QN AUTO: 31.3 PG (ref 26–35)
MCHC RBC AUTO-ENTMCNC: 32.5 G/DL (ref 32–34.5)
MCV RBC AUTO: 96.1 FL (ref 80–99.9)
MICROALBUMIN UR-MCNC: <12 MG/L (ref 0–19)
MICROALBUMIN/CREAT UR-RTO: NORMAL MCG/MG CREAT (ref 0–30)
MONOCYTES NFR BLD: 0.44 K/UL (ref 0.1–0.95)
MONOCYTES NFR BLD: 7 % (ref 2–12)
NEUTROPHILS NFR BLD: 70 % (ref 43–80)
NEUTS SEG NFR BLD: 4.42 K/UL (ref 1.8–7.3)
PLATELET # BLD AUTO: 197 K/UL (ref 130–450)
PMV BLD AUTO: 10.5 FL (ref 7–12)
POTASSIUM SERPL-SCNC: 5 MMOL/L (ref 3.5–5)
PROT SERPL-MCNC: 7.4 G/DL (ref 6.4–8.3)
PSA SERPL-MCNC: 4.57 NG/ML (ref 0–4)
RBC # BLD AUTO: 4.83 M/UL (ref 3.8–5.8)
SODIUM SERPL-SCNC: 142 MMOL/L (ref 132–146)
TESTOST SERPL-MCNC: 532 NG/DL (ref 193–740)
TRIGL SERPL-MCNC: 83 MG/DL
TSH SERPL DL<=0.05 MIU/L-ACNC: 2.25 UIU/ML (ref 0.27–4.2)
VLDLC SERPL CALC-MCNC: 17 MG/DL
WBC OTHER # BLD: 6.4 K/UL (ref 4.5–11.5)

## 2023-09-15 PROCEDURE — 82043 UR ALBUMIN QUANTITATIVE: CPT

## 2023-09-15 PROCEDURE — 80053 COMPREHEN METABOLIC PANEL: CPT

## 2023-09-15 PROCEDURE — 84443 ASSAY THYROID STIM HORMONE: CPT

## 2023-09-15 PROCEDURE — 36415 COLL VENOUS BLD VENIPUNCTURE: CPT

## 2023-09-15 PROCEDURE — 82570 ASSAY OF URINE CREATININE: CPT

## 2023-09-15 PROCEDURE — 84153 ASSAY OF PSA TOTAL: CPT

## 2023-09-15 PROCEDURE — 85025 COMPLETE CBC W/AUTO DIFF WBC: CPT

## 2023-09-15 PROCEDURE — 80061 LIPID PANEL: CPT

## 2023-09-15 PROCEDURE — 84403 ASSAY OF TOTAL TESTOSTERONE: CPT

## 2023-09-15 PROCEDURE — 83036 HEMOGLOBIN GLYCOSYLATED A1C: CPT

## 2023-09-16 SDOH — HEALTH STABILITY: PHYSICAL HEALTH: ON AVERAGE, HOW MANY DAYS PER WEEK DO YOU ENGAGE IN MODERATE TO STRENUOUS EXERCISE (LIKE A BRISK WALK)?: 0 DAYS

## 2023-09-18 ENCOUNTER — OFFICE VISIT (OUTPATIENT)
Dept: ORTHOPEDIC SURGERY | Age: 73
End: 2023-09-18
Payer: MEDICARE

## 2023-09-18 ENCOUNTER — TELEPHONE (OUTPATIENT)
Dept: NON INVASIVE DIAGNOSTICS | Age: 73
End: 2023-09-18

## 2023-09-18 VITALS — BODY MASS INDEX: 34.7 KG/M2 | WEIGHT: 285 LBS | HEIGHT: 76 IN

## 2023-09-18 DIAGNOSIS — M25.512 LEFT SHOULDER PAIN, UNSPECIFIED CHRONICITY: Primary | ICD-10-CM

## 2023-09-18 PROCEDURE — G8417 CALC BMI ABV UP PARAM F/U: HCPCS | Performed by: ORTHOPAEDIC SURGERY

## 2023-09-18 PROCEDURE — 99204 OFFICE O/P NEW MOD 45 MIN: CPT | Performed by: ORTHOPAEDIC SURGERY

## 2023-09-18 PROCEDURE — 3017F COLORECTAL CA SCREEN DOC REV: CPT | Performed by: ORTHOPAEDIC SURGERY

## 2023-09-18 PROCEDURE — G8427 DOCREV CUR MEDS BY ELIG CLIN: HCPCS | Performed by: ORTHOPAEDIC SURGERY

## 2023-09-18 PROCEDURE — 1036F TOBACCO NON-USER: CPT | Performed by: ORTHOPAEDIC SURGERY

## 2023-09-18 PROCEDURE — 1123F ACP DISCUSS/DSCN MKR DOCD: CPT | Performed by: ORTHOPAEDIC SURGERY

## 2023-09-18 NOTE — PROGRESS NOTES
Detail Level: Zone
wounds, no erythema  Psych: normal affect; mood stable  Neurologic: gait normal, sensation grossly intact in extremities  MSK:    Cervical Spine: There is no tenderness to palpation along the cervical spine. Range of motion is normal.  Spurling's is negative    Shoulder Exam:   Left shoulder exam range of motion 170/90/iliac crest.  Positive pain with mild weakness displayed on [place]'s, Jobes, speeds test.  Limited motion positive pain on belly press test.    IMAGING:     XRAY:  3 views of the left shoulder show no acute bony abnormality or advanced degenerative changes    Radiographic findings reviewed with patient    ASSESSMENT   Left shoulder pain      PLAN  Today we discussed his left shoulder. He has been dealing with symptoms for roughly 6 months after tripping and falling down 3 stairs directly onto his left shoulder. Reports rotator cuff surgery to the right shoulder several years ago by another provider. He displayed positive pain and mild weakness on rotator cuff testing today. Conservative options discussed with him today included possible injection, active modification, home exercise, MRI. We will proceed with obtaining an MRI of the left shoulder. We will see him back to discuss results once completed. ESCOBAR Farley  Orthopedic Surgery   09/18/23  5:08 PM    Staff Addendum    I have seen and evaluated the patient and agree with the assessment and plan as documented by Tasneem Redmond CNP. I have performed the key components of the history and physical examination and concur with the findings and plan, and have made changes where appropriate/necessary.           Curt Hood MD  [address]

## 2023-10-31 ENCOUNTER — HOSPITAL ENCOUNTER (OUTPATIENT)
Dept: MRI IMAGING | Age: 73
Discharge: HOME | End: 2023-11-02
Payer: MEDICARE

## 2023-10-31 DIAGNOSIS — M25.512 LEFT SHOULDER PAIN, UNSPECIFIED CHRONICITY: ICD-10-CM

## 2023-10-31 PROCEDURE — 73221 MRI JOINT UPR EXTREM W/O DYE: CPT

## 2023-11-01 NOTE — RESULT ENCOUNTER NOTE
MRI findings discussed with patient.  Please call him to schedule follow up appointment with JTJ to determine treatment moving forward

## 2023-11-02 ENCOUNTER — TELEPHONE (OUTPATIENT)
Dept: ORTHOPEDIC SURGERY | Age: 73
End: 2023-11-02

## 2023-11-02 NOTE — TELEPHONE ENCOUNTER
----- Message from DEIDRE Edmonds CNP sent at 11/1/2023  4:24 PM EDT -----  MRI findings discussed with patient.  Please call him to schedule follow up appointment with CHRISTA to determine treatment moving forward

## 2023-11-03 ENCOUNTER — OFFICE VISIT (OUTPATIENT)
Dept: CARDIOLOGY CLINIC | Age: 73
End: 2023-11-03
Payer: MEDICARE

## 2023-11-03 VITALS
WEIGHT: 278 LBS | OXYGEN SATURATION: 95 % | HEART RATE: 77 BPM | RESPIRATION RATE: 16 BRPM | HEIGHT: 76 IN | BODY MASS INDEX: 33.85 KG/M2 | DIASTOLIC BLOOD PRESSURE: 72 MMHG | SYSTOLIC BLOOD PRESSURE: 128 MMHG

## 2023-11-03 DIAGNOSIS — Z79.4 TYPE 2 DIABETES MELLITUS WITH STAGE 2 CHRONIC KIDNEY DISEASE, WITH LONG-TERM CURRENT USE OF INSULIN (HCC): ICD-10-CM

## 2023-11-03 DIAGNOSIS — I50.42 CHRONIC COMBINED SYSTOLIC AND DIASTOLIC CONGESTIVE HEART FAILURE (HCC): ICD-10-CM

## 2023-11-03 DIAGNOSIS — I42.8 NONISCHEMIC CARDIOMYOPATHY (HCC): Primary | ICD-10-CM

## 2023-11-03 DIAGNOSIS — I10 ESSENTIAL HYPERTENSION: ICD-10-CM

## 2023-11-03 DIAGNOSIS — I48.0 PAROXYSMAL ATRIAL FIBRILLATION (HCC): ICD-10-CM

## 2023-11-03 DIAGNOSIS — E78.00 PURE HYPERCHOLESTEROLEMIA: ICD-10-CM

## 2023-11-03 DIAGNOSIS — E11.22 TYPE 2 DIABETES MELLITUS WITH STAGE 2 CHRONIC KIDNEY DISEASE, WITH LONG-TERM CURRENT USE OF INSULIN (HCC): ICD-10-CM

## 2023-11-03 DIAGNOSIS — E66.8 MODERATE OBESITY: ICD-10-CM

## 2023-11-03 DIAGNOSIS — I49.5 SINUS NODE DYSFUNCTION (HCC): ICD-10-CM

## 2023-11-03 DIAGNOSIS — G47.33 OBSTRUCTIVE SLEEP APNEA: ICD-10-CM

## 2023-11-03 DIAGNOSIS — N18.2 TYPE 2 DIABETES MELLITUS WITH STAGE 2 CHRONIC KIDNEY DISEASE, WITH LONG-TERM CURRENT USE OF INSULIN (HCC): ICD-10-CM

## 2023-11-03 PROCEDURE — 1123F ACP DISCUSS/DSCN MKR DOCD: CPT | Performed by: INTERNAL MEDICINE

## 2023-11-03 PROCEDURE — 3044F HG A1C LEVEL LT 7.0%: CPT | Performed by: INTERNAL MEDICINE

## 2023-11-03 PROCEDURE — 3017F COLORECTAL CA SCREEN DOC REV: CPT | Performed by: INTERNAL MEDICINE

## 2023-11-03 PROCEDURE — G8427 DOCREV CUR MEDS BY ELIG CLIN: HCPCS | Performed by: INTERNAL MEDICINE

## 2023-11-03 PROCEDURE — 93000 ELECTROCARDIOGRAM COMPLETE: CPT | Performed by: INTERNAL MEDICINE

## 2023-11-03 PROCEDURE — 1036F TOBACCO NON-USER: CPT | Performed by: INTERNAL MEDICINE

## 2023-11-03 PROCEDURE — G8484 FLU IMMUNIZE NO ADMIN: HCPCS | Performed by: INTERNAL MEDICINE

## 2023-11-03 PROCEDURE — 3078F DIAST BP <80 MM HG: CPT | Performed by: INTERNAL MEDICINE

## 2023-11-03 PROCEDURE — 3074F SYST BP LT 130 MM HG: CPT | Performed by: INTERNAL MEDICINE

## 2023-11-03 PROCEDURE — 99215 OFFICE O/P EST HI 40 MIN: CPT | Performed by: INTERNAL MEDICINE

## 2023-11-03 PROCEDURE — G8417 CALC BMI ABV UP PARAM F/U: HCPCS | Performed by: INTERNAL MEDICINE

## 2023-11-03 PROCEDURE — 2022F DILAT RTA XM EVC RTNOPTHY: CPT | Performed by: INTERNAL MEDICINE

## 2023-11-03 NOTE — PROGRESS NOTES
risk of future atherosclerotic development. Should orthopedic surgical intervention be necessitated, I feel he would be an acceptable candidate with a low risk of perioperative ischemic events with recommended administration of his beta-blocker and dofetilide preoperatively with a sip of water to reduce risk of perioperative arrhythmia related events and needs of temporary withholding of his oral anticoagulation for 48 hours prior to his surgical procedure in addition to that of his SGLT2 inhibitor. I presently plan his clinical reassessment in approximately 6 months and would happy reevaluate him in the interim should additional cardiovascular difficulties or concerns arise. The patient's current medication list, allergies, problem list and results of all previously ordered testing were reviewed at today's visit. Follow-up office visit in 6 months      Note: This report was completed using computerized voice recognition software. Every effort has been made to ensure accuracy, however; inadvertent computerized transcription errors may be present. Abhi Cormier.  Elio Browning, [address] [facility]    An electronic copy of this follow-up progress note was forwarded to Dr. Bhupinder Oseguera and Corky Krueger

## 2023-11-10 ENCOUNTER — OFFICE VISIT (OUTPATIENT)
Dept: ORTHOPEDIC SURGERY | Age: 73
End: 2023-11-10
Payer: MEDICARE

## 2023-11-10 VITALS — WEIGHT: 285 LBS | BODY MASS INDEX: 34.7 KG/M2 | HEIGHT: 76 IN

## 2023-11-10 DIAGNOSIS — M25.512 LEFT SHOULDER PAIN, UNSPECIFIED CHRONICITY: Primary | ICD-10-CM

## 2023-11-10 PROCEDURE — G8484 FLU IMMUNIZE NO ADMIN: HCPCS | Performed by: ORTHOPAEDIC SURGERY

## 2023-11-10 PROCEDURE — 1036F TOBACCO NON-USER: CPT | Performed by: ORTHOPAEDIC SURGERY

## 2023-11-10 PROCEDURE — G8417 CALC BMI ABV UP PARAM F/U: HCPCS | Performed by: ORTHOPAEDIC SURGERY

## 2023-11-10 PROCEDURE — 1123F ACP DISCUSS/DSCN MKR DOCD: CPT | Performed by: ORTHOPAEDIC SURGERY

## 2023-11-10 PROCEDURE — 3017F COLORECTAL CA SCREEN DOC REV: CPT | Performed by: ORTHOPAEDIC SURGERY

## 2023-11-10 PROCEDURE — 99213 OFFICE O/P EST LOW 20 MIN: CPT | Performed by: ORTHOPAEDIC SURGERY

## 2023-11-10 PROCEDURE — G8427 DOCREV CUR MEDS BY ELIG CLIN: HCPCS | Performed by: ORTHOPAEDIC SURGERY

## 2023-11-10 NOTE — PROGRESS NOTES
[facility]   ORTHOPAEDIC SURGERY AND SPORTS MEDICINE  DATE OF VISIT: 11/10/23  Follow Up Visit     CHIEF COMPLAINT:   Chief Complaint   Patient presents with    Shoulder Pain     Pt is here today to review his left shoulder MRI. Pt states his shoulder is feeling somewhat better today. HPI:    Lauren Vu is a 75y.o. year old male who presented to the office today for follow up of his left shoulder. He is here to review his MRI. He reports no pain in the shoulder. He just recently hung a 65 inch TV with no issues      REVIEW OF SYSTEMS:     Constitutional:  Negative for weight loss, fevers, chills, fatigue  Cardiovascular: Negative for chest pain, palpitations  Pulmonary: Negative for shortness of breath, labored breathing, cough  GI: negative for abdominal pain, nausea, vomiting   MSK: per HPI  Skin: negative for rash, open wounds    All other systems reviewed and are negative       Physical Exam:     No data recorded    General: Alert and oriented x3, no acute distress  Cardiovascular/pulmonary: No labored breathing, peripheral perfusion intact  Musculoskeletal:    Exam the shoulder shows full range of motion. There is good compensation with rotator cuff testing without pain. There is no tenderness around the shoulder. Controlled Substances Monitoring:      Imaging:  MRI reviewed showing retracted full-thickness irreparable tear with grade 5 atrophy of the supraspinatus. There also involves the infraspinatus with retraction to the glenoid subscapularis intact. 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 We discussed treatment options moving forward and he is having no pain and good function. We will hold off on any further treatment at this time. We discussed the irreparable nature of his tear and surgery will be limited to reverse total shoulder replacement. He understands.   If he starts

## 2023-12-27 RX ORDER — METOPROLOL SUCCINATE 50 MG/1
150 TABLET, EXTENDED RELEASE ORAL 2 TIMES DAILY
Qty: 540 TABLET | Refills: 3 | Status: SHIPPED | OUTPATIENT
Start: 2023-12-27

## 2023-12-27 RX ORDER — METOPROLOL SUCCINATE 50 MG/1
150 TABLET, EXTENDED RELEASE ORAL 2 TIMES DAILY
Qty: 540 TABLET | Refills: 3 | OUTPATIENT
Start: 2023-12-27

## 2023-12-27 RX ORDER — FUROSEMIDE 20 MG/1
20 TABLET ORAL DAILY
Qty: 90 TABLET | Refills: 3 | OUTPATIENT
Start: 2023-12-27

## 2024-02-08 ENCOUNTER — TELEPHONE (OUTPATIENT)
Dept: NON INVASIVE DIAGNOSTICS | Age: 74
End: 2024-02-08

## 2024-02-08 NOTE — TELEPHONE ENCOUNTER
----- Message from Chadd Matta MD sent at 2/7/2024  3:31 PM EST -----  Stored EGMs are consistent with or suggestive of Atrial Fibrillation  AT Ord: 4%  Total number of events: 1  AT/AF episode in progress since 06-Feb-2024 @ 06:51  73 ATP sequences delivered, 0 aborted.  Created By: Joe Aguillon 02/07/2024 02:03 PM  Non-sustained Ventricular Tachycardia  1  episodes binned as VT-NS suggestive of Non-sustained VT  Total episodes: 15  Duration 2 seconds or less  V rates 158-194 bpm    Please check remote in 1-2 weeks and schedule DC-CV if remains in AF. Thanks.

## 2024-02-19 NOTE — PROGRESS NOTES
[facility]  Outpatient Progress Note  Kasi Varner  1950  Date of Service: 2/20/2024  PCP: Francisco Javier Muhammad MD  Cardiologist: El Ruvalcaba MD  Electrophysiologist: Chadd Matta MD        Subjective: Kasi Varner is seen for follow-up and management of persistent atrial fibrillation and pacemaker management.  The patient was last seen by ANGELITA Torres in February 2023. He underwent cardioversion on 9/2/22 and found to be in 100% AF since 9/12/22 on remote interrogation 12/23/22. The patient was advised Tikosyn dose up titration versus Amiodarone therapy. His echocardiogram on 9/23/22 showed LV EF of 30-35%. His Lisinopril was changed to Entresto at the end of September of 2022. Nuclear stress test on 12/2/22 showed no ischemia and LV EF could not be calculated.  He was admitted January 2023 and Tikosyn increased to 500 mcg every 12 but he developed prolonged QTc, dose decreased down to 250 mcg every 12 hours. He underwent successful DC-cardioversion on 1/13/23. He was advised to continue using WCD LifeVest.  Limited echo was repeated on 1/20/2023 which showed EF of 40 to 45%.  LifeVest was discontinued.  Today he reports feeling overall well. His device site looks well healed and free from infection or erosion. He offers no complaints from a device POV. The patient presents today in AF and continues on Tikosyn. Device interrogation showed that he has been in AF since 2/17/2024. Currently denies any angina, syncope, dyspnea on exertion, paroxysmal nocturnal dyspnea and palpitations. The patient continues to be followed remotely.     Patient Active Problem List   Diagnosis    Persistent atrial fibrillation (HCC)    Type 2 diabetes mellitus with stage 3a chronic kidney disease, with long-term current use of insulin (HCC)    Moderate obesity    LV dysfunction    Nonischemic cardiomyopathy (HCC)    Chronic combined systolic

## 2024-02-20 ENCOUNTER — OFFICE VISIT (OUTPATIENT)
Dept: NON INVASIVE DIAGNOSTICS | Age: 74
End: 2024-02-20
Payer: MEDICARE

## 2024-02-20 VITALS
HEIGHT: 76 IN | DIASTOLIC BLOOD PRESSURE: 68 MMHG | RESPIRATION RATE: 18 BRPM | HEART RATE: 114 BPM | BODY MASS INDEX: 34.58 KG/M2 | WEIGHT: 284 LBS | SYSTOLIC BLOOD PRESSURE: 110 MMHG

## 2024-02-20 DIAGNOSIS — I48.19 PERSISTENT ATRIAL FIBRILLATION (HCC): Primary | ICD-10-CM

## 2024-02-20 DIAGNOSIS — Z95.0 PACEMAKER: ICD-10-CM

## 2024-02-20 PROCEDURE — 99215 OFFICE O/P EST HI 40 MIN: CPT | Performed by: INTERNAL MEDICINE

## 2024-02-20 PROCEDURE — 93280 PM DEVICE PROGR EVAL DUAL: CPT | Performed by: INTERNAL MEDICINE

## 2024-02-20 PROCEDURE — 3078F DIAST BP <80 MM HG: CPT | Performed by: INTERNAL MEDICINE

## 2024-02-20 PROCEDURE — 1123F ACP DISCUSS/DSCN MKR DOCD: CPT | Performed by: INTERNAL MEDICINE

## 2024-02-20 PROCEDURE — 3074F SYST BP LT 130 MM HG: CPT | Performed by: INTERNAL MEDICINE

## 2024-02-20 PROCEDURE — 1036F TOBACCO NON-USER: CPT | Performed by: INTERNAL MEDICINE

## 2024-02-20 PROCEDURE — G8427 DOCREV CUR MEDS BY ELIG CLIN: HCPCS | Performed by: INTERNAL MEDICINE

## 2024-02-20 PROCEDURE — 3017F COLORECTAL CA SCREEN DOC REV: CPT | Performed by: INTERNAL MEDICINE

## 2024-02-20 PROCEDURE — G8417 CALC BMI ABV UP PARAM F/U: HCPCS | Performed by: INTERNAL MEDICINE

## 2024-02-20 PROCEDURE — G8484 FLU IMMUNIZE NO ADMIN: HCPCS | Performed by: INTERNAL MEDICINE

## 2024-02-21 ENCOUNTER — TELEPHONE (OUTPATIENT)
Dept: NON INVASIVE DIAGNOSTICS | Age: 74
End: 2024-02-21

## 2024-02-21 NOTE — TELEPHONE ENCOUNTER
----- Message from Chadd Matta MD sent at 2/20/2024 11:11 AM EST -----  Remote in 1 week to determine rhythm, if in AF will schedule for DC-cardioversion. Thank you

## 2024-02-21 NOTE — TELEPHONE ENCOUNTER
Wireless remote scheduled for 2.27.2024    Caroline SANCHEZN,RN   [facility] [place]   Device Clinic

## 2024-02-23 ENCOUNTER — TELEPHONE (OUTPATIENT)
Dept: NON INVASIVE DIAGNOSTICS | Age: 74
End: 2024-02-23

## 2024-02-23 NOTE — TELEPHONE ENCOUNTER
The patient wants to know if he can go out of town tomorrow for about a week? He was in AF at last ov. Please advise.    Electronically signed by Hoa Fallon MA on 2/23/2024 at 1:53 PM

## 2024-02-26 NOTE — TELEPHONE ENCOUNTER
Patient notified and verbalized understanding.     Electronically signed by Hoa Fallon MA on 2/26/2024 at 10:40 AM

## 2024-02-27 ENCOUNTER — TELEPHONE (OUTPATIENT)
Dept: NON INVASIVE DIAGNOSTICS | Age: 74
End: 2024-02-27

## 2024-02-27 NOTE — TELEPHONE ENCOUNTER
Pt notified of results and verbalized understanding.  The patient is going to purchase a [employer] mobile.    Electronically signed by Hoa Fallon MA on 2/27/2024 at 11:39 AM

## 2024-02-27 NOTE — TELEPHONE ENCOUNTER
----- Message from DEIDRE Olsen CNP sent at 2/27/2024 11:28 AM EST -----  Patient monitor showed that he is back in normal sinus rhythm. Can you let him know, he was concerned about Afib episodes.   Thanks   DEIDRE Olsen CNP

## 2024-03-13 ENCOUNTER — HOSPITAL ENCOUNTER (OUTPATIENT)
Age: 74
Discharge: HOME | End: 2024-03-13
Payer: MEDICARE

## 2024-03-13 LAB
ALBUMIN SERPL-MCNC: 4.5 G/DL (ref 3.5–5.2)
ALP SERPL-CCNC: 69 U/L (ref 40–129)
ALT SERPL-CCNC: 12 U/L (ref 0–40)
ANION GAP SERPL CALCULATED.3IONS-SCNC: 10 MMOL/L (ref 7–16)
AST SERPL-CCNC: 13 U/L (ref 0–39)
BILIRUB SERPL-MCNC: 0.3 MG/DL (ref 0–1.2)
BUN SERPL-MCNC: 19 MG/DL (ref 6–23)
CALCIUM SERPL-MCNC: 9.8 MG/DL (ref 8.6–10.2)
CHLORIDE SERPL-SCNC: 105 MMOL/L (ref 98–107)
CHOLEST SERPL-MCNC: 117 MG/DL
CO2 SERPL-SCNC: 28 MMOL/L (ref 22–29)
CREAT SERPL-MCNC: 1.2 MG/DL (ref 0.7–1.2)
GFR SERPL CREATININE-BSD FRML MDRD: >60 ML/MIN/1.73M2
GLUCOSE SERPL-MCNC: 160 MG/DL (ref 74–99)
HDLC SERPL-MCNC: 38 MG/DL
LDLC SERPL CALC-MCNC: 58 MG/DL
POTASSIUM SERPL-SCNC: 4.5 MMOL/L (ref 3.5–5)
PROT SERPL-MCNC: 7.4 G/DL (ref 6.4–8.3)
SODIUM SERPL-SCNC: 143 MMOL/L (ref 132–146)
TRIGL SERPL-MCNC: 106 MG/DL
VLDLC SERPL CALC-MCNC: 21 MG/DL

## 2024-03-13 PROCEDURE — 80053 COMPREHEN METABOLIC PANEL: CPT

## 2024-03-13 PROCEDURE — 84154 ASSAY OF PSA FREE: CPT

## 2024-03-13 PROCEDURE — 80061 LIPID PANEL: CPT

## 2024-03-13 PROCEDURE — 36415 COLL VENOUS BLD VENIPUNCTURE: CPT

## 2024-03-15 LAB
PSA FREE MFR SERPL: 36.1 %
PSA FREE SERPL-MCNC: 1.3 UG/L
PSA SERPL-MCNC: 3.6 NG/ML (ref 0–4)

## 2024-04-10 RX ORDER — FUROSEMIDE 20 MG/1
20 TABLET ORAL DAILY
Qty: 90 TABLET | Refills: 3 | Status: SHIPPED | OUTPATIENT
Start: 2024-04-10

## 2024-04-23 ENCOUNTER — TELEPHONE (OUTPATIENT)
Dept: NON INVASIVE DIAGNOSTICS | Age: 74
End: 2024-04-23

## 2024-06-06 DIAGNOSIS — I48.19 PERSISTENT ATRIAL FIBRILLATION (HCC): ICD-10-CM

## 2024-06-07 RX ORDER — DOFETILIDE 0.25 MG/1
250 CAPSULE ORAL EVERY 12 HOURS SCHEDULED
Qty: 180 CAPSULE | Refills: 1 | Status: SHIPPED | OUTPATIENT
Start: 2024-06-07

## 2024-08-15 ENCOUNTER — HOSPITAL ENCOUNTER (OUTPATIENT)
Age: 74
Discharge: HOME | End: 2024-08-15
Payer: MEDICARE

## 2024-08-15 LAB
ANION GAP SERPL CALCULATED.3IONS-SCNC: 12 MMOL/L (ref 7–16)
BASOPHILS # BLD: 0.03 K/UL (ref 0–0.2)
BASOPHILS NFR BLD: 1 % (ref 0–2)
BUN SERPL-MCNC: 20 MG/DL (ref 6–23)
CALCIUM SERPL-MCNC: 9.1 MG/DL (ref 8.6–10.2)
CHLORIDE SERPL-SCNC: 106 MMOL/L (ref 98–107)
CO2 SERPL-SCNC: 26 MMOL/L (ref 22–29)
CREAT SERPL-MCNC: 1.2 MG/DL (ref 0.7–1.2)
CREAT UR-MCNC: 83.7 MG/DL (ref 40–278)
EOSINOPHIL # BLD: 0.21 K/UL (ref 0.05–0.5)
EOSINOPHILS RELATIVE PERCENT: 4 % (ref 0–6)
ERYTHROCYTE [DISTWIDTH] IN BLOOD BY AUTOMATED COUNT: 13.2 % (ref 11.5–15)
GFR, ESTIMATED: 66 ML/MIN/1.73M2
GLUCOSE SERPL-MCNC: 154 MG/DL (ref 74–99)
HCT VFR BLD AUTO: 44.4 % (ref 37–54)
HGB BLD-MCNC: 14.5 G/DL (ref 12.5–16.5)
IMM GRANULOCYTES # BLD AUTO: <0.03 K/UL (ref 0–0.58)
IMM GRANULOCYTES NFR BLD: 0 % (ref 0–5)
LYMPHOCYTES NFR BLD: 1.25 K/UL (ref 1.5–4)
LYMPHOCYTES RELATIVE PERCENT: 21 % (ref 20–42)
MAGNESIUM SERPL-MCNC: 2.1 MG/DL (ref 1.6–2.6)
MCH RBC QN AUTO: 31.2 PG (ref 26–35)
MCHC RBC AUTO-ENTMCNC: 32.7 G/DL (ref 32–34.5)
MCV RBC AUTO: 95.5 FL (ref 80–99.9)
MONOCYTES NFR BLD: 0.54 K/UL (ref 0.1–0.95)
MONOCYTES NFR BLD: 9 % (ref 2–12)
NEUTROPHILS NFR BLD: 66 % (ref 43–80)
NEUTS SEG NFR BLD: 3.97 K/UL (ref 1.8–7.3)
PHOSPHATE SERPL-MCNC: 4 MG/DL (ref 2.5–4.5)
PLATELET # BLD AUTO: 216 K/UL (ref 130–450)
PMV BLD AUTO: 11.1 FL (ref 7–12)
POTASSIUM SERPL-SCNC: 4.2 MMOL/L (ref 3.5–5)
RBC # BLD AUTO: 4.65 M/UL (ref 3.8–5.8)
SODIUM SERPL-SCNC: 144 MMOL/L (ref 132–146)
TOTAL PROTEIN, URINE: 6 MG/DL (ref 0–12)
URATE SERPL-MCNC: 4.3 MG/DL (ref 3.4–7)
WBC OTHER # BLD: 6 K/UL (ref 4.5–11.5)

## 2024-08-15 PROCEDURE — 84156 ASSAY OF PROTEIN URINE: CPT

## 2024-08-15 PROCEDURE — 36415 COLL VENOUS BLD VENIPUNCTURE: CPT

## 2024-08-15 PROCEDURE — 84100 ASSAY OF PHOSPHORUS: CPT

## 2024-08-15 PROCEDURE — 85025 COMPLETE CBC W/AUTO DIFF WBC: CPT

## 2024-08-15 PROCEDURE — 82570 ASSAY OF URINE CREATININE: CPT

## 2024-08-15 PROCEDURE — 80048 BASIC METABOLIC PNL TOTAL CA: CPT

## 2024-08-15 PROCEDURE — 83735 ASSAY OF MAGNESIUM: CPT

## 2024-08-15 PROCEDURE — 84550 ASSAY OF BLOOD/URIC ACID: CPT

## 2024-08-15 RX ORDER — SACUBITRIL AND VALSARTAN 49; 51 MG/1; MG/1
1 TABLET, FILM COATED ORAL 2 TIMES DAILY
Qty: 180 TABLET | Refills: 3 | Status: SHIPPED | OUTPATIENT
Start: 2024-08-15

## 2024-09-05 ENCOUNTER — OFFICE VISIT (OUTPATIENT)
Dept: CARDIOLOGY CLINIC | Age: 74
End: 2024-09-05
Payer: MEDICARE

## 2024-09-05 VITALS
DIASTOLIC BLOOD PRESSURE: 64 MMHG | HEIGHT: 76 IN | RESPIRATION RATE: 20 BRPM | BODY MASS INDEX: 33.73 KG/M2 | HEART RATE: 92 BPM | WEIGHT: 277 LBS | SYSTOLIC BLOOD PRESSURE: 130 MMHG

## 2024-09-05 DIAGNOSIS — E78.00 PURE HYPERCHOLESTEROLEMIA: ICD-10-CM

## 2024-09-05 DIAGNOSIS — E11.22 TYPE 2 DIABETES MELLITUS WITH STAGE 3A CHRONIC KIDNEY DISEASE, WITH LONG-TERM CURRENT USE OF INSULIN (HCC): ICD-10-CM

## 2024-09-05 DIAGNOSIS — G47.33 OBSTRUCTIVE SLEEP APNEA: ICD-10-CM

## 2024-09-05 DIAGNOSIS — I48.0 PAROXYSMAL ATRIAL FIBRILLATION (HCC): ICD-10-CM

## 2024-09-05 DIAGNOSIS — I50.42 CHRONIC COMBINED SYSTOLIC AND DIASTOLIC CONGESTIVE HEART FAILURE (HCC): ICD-10-CM

## 2024-09-05 DIAGNOSIS — I42.8 NONISCHEMIC CARDIOMYOPATHY (HCC): Primary | ICD-10-CM

## 2024-09-05 DIAGNOSIS — N18.31 TYPE 2 DIABETES MELLITUS WITH STAGE 3A CHRONIC KIDNEY DISEASE, WITH LONG-TERM CURRENT USE OF INSULIN (HCC): ICD-10-CM

## 2024-09-05 DIAGNOSIS — Z79.4 TYPE 2 DIABETES MELLITUS WITH STAGE 3A CHRONIC KIDNEY DISEASE, WITH LONG-TERM CURRENT USE OF INSULIN (HCC): ICD-10-CM

## 2024-09-05 DIAGNOSIS — I49.5 SINUS NODE DYSFUNCTION (HCC): ICD-10-CM

## 2024-09-05 DIAGNOSIS — E66.8 MODERATE OBESITY: ICD-10-CM

## 2024-09-05 PROCEDURE — 3046F HEMOGLOBIN A1C LEVEL >9.0%: CPT | Performed by: INTERNAL MEDICINE

## 2024-09-05 PROCEDURE — 3078F DIAST BP <80 MM HG: CPT | Performed by: INTERNAL MEDICINE

## 2024-09-05 PROCEDURE — 1036F TOBACCO NON-USER: CPT | Performed by: INTERNAL MEDICINE

## 2024-09-05 PROCEDURE — G8417 CALC BMI ABV UP PARAM F/U: HCPCS | Performed by: INTERNAL MEDICINE

## 2024-09-05 PROCEDURE — 99215 OFFICE O/P EST HI 40 MIN: CPT | Performed by: INTERNAL MEDICINE

## 2024-09-05 PROCEDURE — 93000 ELECTROCARDIOGRAM COMPLETE: CPT | Performed by: INTERNAL MEDICINE

## 2024-09-05 PROCEDURE — 3075F SYST BP GE 130 - 139MM HG: CPT | Performed by: INTERNAL MEDICINE

## 2024-09-05 PROCEDURE — G8427 DOCREV CUR MEDS BY ELIG CLIN: HCPCS | Performed by: INTERNAL MEDICINE

## 2024-09-05 PROCEDURE — 2022F DILAT RTA XM EVC RTNOPTHY: CPT | Performed by: INTERNAL MEDICINE

## 2024-09-05 PROCEDURE — 3017F COLORECTAL CA SCREEN DOC REV: CPT | Performed by: INTERNAL MEDICINE

## 2024-09-05 PROCEDURE — 1123F ACP DISCUSS/DSCN MKR DOCD: CPT | Performed by: INTERNAL MEDICINE

## 2024-09-05 NOTE — PROGRESS NOTES
OUTPATIENT CARDIOLOGY FOLLOW-UP    Name: Kais Varner    Age: 74 y.o.    Primary Care Physician: Francisco Javier Muhammad MD    Date of Service: 9/5/2024    Chief Complaint: Nonischemic cardiomyopathy, chronic combined systolic and diastolic heart failure, paroxysmal atrial fibrillation, sinus node dysfunction, chronic kidney disease, moderate obesity, obstructive sleep apnea    Interim History: Since his most recent evaluation, the patient remains compensated from a cardiovascular standpoint with no symptoms of anginal-like chest discomfort or other ischemic equivalents no manifestations of decompensated left ventricular systolic dysfunction or volume overload nor arrhythmia related manifestations.  He denies symptoms of a focal neurological origin or bleeding in the face of chronic anticoagulation.  Interim pacemaker assessment demonstrates approximately 85% atrial pacing with minimal ventricular pacing and no atrial arrhythmias.  Since his most recent evaluation, he has made no significant progress in additional weight reduction and remains normotensive with reported suboptimal control of his diabetes and a most recent glycosylated hemoglobin level of approximately 7.5% with acceptable control of his serum lipids.  At the time of evaluation by the orthopedic surgical service, repair of his shoulder was not felt possible with recommended replacement and based on his present functional capabilities, he has chosen to continue conservative measures.    Review of Systems:   The remainder of a complete multisystem review including consitutional, central nervous, respiratory, circulatory, gastrointestinal, genitourinary, endocrinologic, hematologic, musculoskeletal and psychiatric are negative.    Past Medical History:  Past Medical History:   Diagnosis Date    Arthritis     Asbestos exposure     Atrial fibrillation (HCC) 07/2016    w/RVR    BPH (benign prostatic hyperplasia)     Cardiomyopathy (HCC)     CHF (congestive

## 2024-09-10 ENCOUNTER — TELEPHONE (OUTPATIENT)
Dept: CARDIOLOGY | Age: 74
End: 2024-09-10

## 2024-10-29 DIAGNOSIS — I48.19 PERSISTENT ATRIAL FIBRILLATION (HCC): ICD-10-CM

## 2024-10-30 RX ORDER — DOFETILIDE 0.25 MG/1
250 CAPSULE ORAL EVERY 12 HOURS SCHEDULED
Qty: 180 CAPSULE | Refills: 3 | Status: SHIPPED | OUTPATIENT
Start: 2024-10-30

## 2024-11-18 ENCOUNTER — HOSPITAL ENCOUNTER (OUTPATIENT)
Dept: CARDIOLOGY | Age: 74
Discharge: HOME | End: 2024-11-20
Attending: INTERNAL MEDICINE
Payer: MEDICARE

## 2024-11-18 VITALS — HEIGHT: 76 IN | BODY MASS INDEX: 31.05 KG/M2 | WEIGHT: 255 LBS

## 2024-11-18 DIAGNOSIS — I42.8 NONISCHEMIC CARDIOMYOPATHY (HCC): ICD-10-CM

## 2024-11-18 DIAGNOSIS — I48.0 PAROXYSMAL ATRIAL FIBRILLATION (HCC): ICD-10-CM

## 2024-11-18 LAB
ECHO AO ASC DIAM: 4.2 CM
ECHO AO ASCENDING AORTA INDEX: 1.71 CM/M2
ECHO AO ROOT DIAM: 3.7 CM
ECHO AO ROOT INDEX: 1.5 CM/M2
ECHO AO ST JNCT DIAM: 3.2 CM
ECHO AV AREA PEAK VELOCITY: 2.8 CM2
ECHO AV AREA VTI: 2.9 CM2
ECHO AV AREA/BSA PEAK VELOCITY: 1.1 CM2/M2
ECHO AV AREA/BSA VTI: 1.2 CM2/M2
ECHO AV CUSP MM: 2.1 CM
ECHO AV MEAN GRADIENT: 3 MMHG
ECHO AV MEAN VELOCITY: 0.9 M/S
ECHO AV PEAK GRADIENT: 6 MMHG
ECHO AV PEAK VELOCITY: 1.2 M/S
ECHO AV VELOCITY RATIO: 0.67
ECHO AV VTI: 26.1 CM
ECHO BSA: 2.49 M2
ECHO IVC PROX: 1.5 CM
ECHO LA DIAMETER INDEX: 2.03 CM/M2
ECHO LA DIAMETER: 5 CM
ECHO LA TO AORTIC ROOT RATIO: 1.35
ECHO LA VOL A-L A2C: 99 ML (ref 18–58)
ECHO LA VOL A-L A4C: 118 ML (ref 18–58)
ECHO LA VOL MOD A2C: 93 ML (ref 18–58)
ECHO LA VOL MOD A4C: 106 ML (ref 18–58)
ECHO LA VOLUME AREA LENGTH: 111 ML
ECHO LA VOLUME INDEX A-L A2C: 40 ML/M2 (ref 16–34)
ECHO LA VOLUME INDEX A-L A4C: 48 ML/M2 (ref 16–34)
ECHO LA VOLUME INDEX AREA LENGTH: 45 ML/M2 (ref 16–34)
ECHO LA VOLUME INDEX MOD A2C: 38 ML/M2 (ref 16–34)
ECHO LA VOLUME INDEX MOD A4C: 43 ML/M2 (ref 16–34)
ECHO LV E' LATERAL VELOCITY: 11 CM/S
ECHO LV E' SEPTAL VELOCITY: 6 CM/S
ECHO LV EF PHYSICIAN: 60 %
ECHO LV FRACTIONAL SHORTENING: 33 % (ref 28–44)
ECHO LV INTERNAL DIMENSION DIASTOLE INDEX: 2.44 CM/M2
ECHO LV INTERNAL DIMENSION DIASTOLIC: 6 CM (ref 4.2–5.9)
ECHO LV INTERNAL DIMENSION SYSTOLIC INDEX: 1.63 CM/M2
ECHO LV INTERNAL DIMENSION SYSTOLIC: 4 CM
ECHO LV ISOVOLUMETRIC RELAXATION TIME (IVRT): 100.4 MS
ECHO LV IVSD: 1.2 CM (ref 0.6–1)
ECHO LV MASS 2D: 279.6 G (ref 88–224)
ECHO LV MASS INDEX 2D: 113.7 G/M2 (ref 49–115)
ECHO LV POSTERIOR WALL DIASTOLIC: 1 CM (ref 0.6–1)
ECHO LV RELATIVE WALL THICKNESS RATIO: 0.33
ECHO LVOT AREA: 4.2 CM2
ECHO LVOT AV VTI INDEX: 0.7
ECHO LVOT DIAM: 2.3 CM
ECHO LVOT MEAN GRADIENT: 2 MMHG
ECHO LVOT PEAK GRADIENT: 3 MMHG
ECHO LVOT PEAK VELOCITY: 0.8 M/S
ECHO LVOT STROKE VOLUME INDEX: 31.1 ML/M2
ECHO LVOT SV: 76.4 ML
ECHO LVOT VTI: 18.4 CM
ECHO MV "A" WAVE DURATION: 121.1 MSEC
ECHO MV A VELOCITY: 0.65 M/S
ECHO MV AREA PHT: 1.8 CM2
ECHO MV AREA VTI: 3.9 CM2
ECHO MV E DECELERATION TIME (DT): 300.5 MS
ECHO MV E VELOCITY: 0.5 M/S
ECHO MV E/A RATIO: 0.77
ECHO MV E/E' LATERAL: 4.55
ECHO MV E/E' RATIO (AVERAGED): 6.44
ECHO MV E/E' SEPTAL: 8.33
ECHO MV LVOT VTI INDEX: 1.07
ECHO MV MAX VELOCITY: 0.8 M/S
ECHO MV MEAN GRADIENT: 1 MMHG
ECHO MV MEAN VELOCITY: 0.5 M/S
ECHO MV PEAK GRADIENT: 3 MMHG
ECHO MV PRESSURE HALF TIME (PHT): 121.3 MS
ECHO MV VTI: 19.7 CM
ECHO PULMONARY ARTERY END DIASTOLIC PRESSURE: 6 MMHG
ECHO PV REGURGITANT MAX VELOCITY: 1.2 M/S
ECHO PVEIN PEAK D VELOCITY: 0.5 M/S
ECHO PVEIN PEAK S VELOCITY: 0.6 M/S
ECHO PVEIN S/D RATIO: 1.2
ECHO RA AREA 4C: 24.3 CM2
ECHO RV BASAL DIMENSION: 4.4 CM
ECHO RV INTERNAL DIMENSION: 3.6 CM
ECHO RV LONGITUDINAL DIMENSION: 7.6 CM
ECHO RV MID DIMENSION: 3.1 CM
ECHO TV REGURGITANT MAX VELOCITY: 2.49 M/S
ECHO TV REGURGITANT PEAK GRADIENT: 25 MMHG

## 2024-11-18 PROCEDURE — C8929 TTE W OR WO FOL WCON,DOPPLER: HCPCS

## 2024-11-18 PROCEDURE — 93306 TTE W/DOPPLER COMPLETE: CPT | Performed by: INTERNAL MEDICINE

## 2024-11-18 PROCEDURE — 2580000003 HC RX 258: Performed by: INTERNAL MEDICINE

## 2024-11-18 PROCEDURE — 6360000004 HC RX CONTRAST MEDICATION: Performed by: INTERNAL MEDICINE

## 2024-11-18 RX ORDER — SODIUM CHLORIDE 0.9 % (FLUSH) 0.9 %
10 SYRINGE (ML) INJECTION PRN
Status: DISCONTINUED | OUTPATIENT
Start: 2024-11-18 | End: 2024-11-21 | Stop reason: HOSPADM

## 2024-11-18 RX ADMIN — SODIUM CHLORIDE, PRESERVATIVE FREE 8.7 ML: 5 INJECTION INTRAVENOUS at 10:47

## 2024-11-18 RX ADMIN — SODIUM CHLORIDE, PRESERVATIVE FREE 10 ML: 5 INJECTION INTRAVENOUS at 10:51

## 2024-11-18 RX ADMIN — PERFLUTREN 1.5 ML: 6.52 INJECTION, SUSPENSION INTRAVENOUS at 10:47

## 2024-12-03 RX ORDER — METOPROLOL SUCCINATE 50 MG/1
150 TABLET, EXTENDED RELEASE ORAL 2 TIMES DAILY
Qty: 540 TABLET | Refills: 3 | Status: SHIPPED | OUTPATIENT
Start: 2024-12-03

## 2024-12-13 ENCOUNTER — TELEPHONE (OUTPATIENT)
Dept: CARDIOLOGY CLINIC | Age: 74
End: 2024-12-13

## 2024-12-13 NOTE — PROGRESS NOTES
[facility]  Outpatient Progress Note  Kasi Varner  1950  Date of Service: 12/16/2024  PCP: Francisco Javier Muhammad MD  Cardiologist: El Ruvalcaba MD  Electrophysiologist: Chadd Matta MD        Subjective: Kasi Varner is seen for follow-up and management of persistent atrial fibrillation and pacemaker in situ.  The patient was last seen by me in February 2024.  At which time the patient presented in atrial fibrillation.  He had a follow-up remote transmission that showed sinus rhythm and DC-cardioversion was deferred.  Today he reports feeling overall well and offers no complaints from a device POV. The device site looks well healed and free from infection or erosion.  The patient denies any chest pain, dyspnea, palpitations, dizziness, syncope, orthopnea or paroxysmal nocturnal dyspnea. The patient continues to be followed remotely. The patient presents today in SR and continues on Tikosyn.     Patient Active Problem List   Diagnosis    Persistent atrial fibrillation (HCC)    Type 2 diabetes mellitus with stage 3a chronic kidney disease, with long-term current use of insulin (HCC)    Moderate obesity    LV dysfunction    Nonischemic cardiomyopathy (HCC)    Chronic combined systolic and diastolic congestive heart failure (HCC)    Obstructive sleep apnea    Typical atrial flutter (HCC)    Paroxysmal atrial fibrillation (HCC)    Tachy-vaughn syndrome (HCC)    Sinus node dysfunction (HCC)    Status post catheter ablation of atrial fibrillation    Cardiac pacemaker in situ    Actinic keratosis    Benign hypertensive renal disease    Changes in skin texture    Epidermoid cyst of skin    Erectile dysfunction    Essential hypertension    Fatigue    Hyperkalemia    Hypertrophic condition of skin    Inflamed seborrheic keratosis    Lichenification and lichen simplex chronicus    Medical care complication    Pure hypercholesterolemia

## 2024-12-13 NOTE — TELEPHONE ENCOUNTER
----- Message from Dr. Hitesh Ruvalcaba MD sent at 11/18/2024 12:10 PM EST -----  Please notify patient that echocardiogram was reviewed and demonstrates improvement of heart muscle function.  Continue as directed and follow-up as scheduled

## 2024-12-16 ENCOUNTER — OFFICE VISIT (OUTPATIENT)
Dept: NON INVASIVE DIAGNOSTICS | Age: 74
End: 2024-12-16

## 2024-12-16 VITALS
WEIGHT: 271 LBS | SYSTOLIC BLOOD PRESSURE: 112 MMHG | HEIGHT: 74 IN | BODY MASS INDEX: 34.78 KG/M2 | OXYGEN SATURATION: 96 % | DIASTOLIC BLOOD PRESSURE: 60 MMHG | RESPIRATION RATE: 18 BRPM | TEMPERATURE: 97.8 F | HEART RATE: 74 BPM

## 2024-12-16 DIAGNOSIS — Z95.0 CARDIAC PACEMAKER IN SITU: ICD-10-CM

## 2024-12-16 DIAGNOSIS — I48.19 PERSISTENT ATRIAL FIBRILLATION (HCC): Primary | ICD-10-CM

## 2024-12-16 RX ORDER — GLIPIZIDE 5 MG/1
5 TABLET ORAL
COMMUNITY

## 2024-12-16 RX ORDER — CELECOXIB 200 MG/1
CAPSULE ORAL
COMMUNITY
Start: 2024-10-31

## 2025-01-24 RX ORDER — FUROSEMIDE 20 MG/1
20 TABLET ORAL DAILY
Qty: 90 TABLET | Refills: 3 | Status: SHIPPED | OUTPATIENT
Start: 2025-01-24

## 2025-03-13 RX ORDER — RIVAROXABAN 20 MG/1
20 TABLET, FILM COATED ORAL DAILY
Qty: 90 TABLET | Refills: 3 | Status: SHIPPED | OUTPATIENT
Start: 2025-03-13

## 2025-06-11 NOTE — TELEPHONE ENCOUNTER
Refill request for dofetilide   From:  Ivonne    Last visit:  2/20/24 with CK  Next visit:  12/16/24 with CK    Last labs: 8/15/24    Last refill:  6/6/24 90 day 1 refill     [facility]:  [facility]  
2019